# Patient Record
Sex: MALE | Race: BLACK OR AFRICAN AMERICAN | NOT HISPANIC OR LATINO | URBAN - METROPOLITAN AREA
[De-identification: names, ages, dates, MRNs, and addresses within clinical notes are randomized per-mention and may not be internally consistent; named-entity substitution may affect disease eponyms.]

---

## 2019-10-21 ENCOUNTER — INPATIENT (INPATIENT)
Facility: HOSPITAL | Age: 64
LOS: 17 days | Discharge: NURSING, REHAB OR LONG-TERM CARE | DRG: 981 | End: 2019-11-08
Attending: INTERNAL MEDICINE | Admitting: INTERNAL MEDICINE
Payer: MEDICAID

## 2019-10-21 VITALS
WEIGHT: 190.04 LBS | TEMPERATURE: 98 F | HEART RATE: 86 BPM | RESPIRATION RATE: 18 BRPM | SYSTOLIC BLOOD PRESSURE: 123 MMHG | DIASTOLIC BLOOD PRESSURE: 84 MMHG | OXYGEN SATURATION: 98 % | HEIGHT: 72 IN

## 2019-10-21 DIAGNOSIS — E11.9 TYPE 2 DIABETES MELLITUS WITHOUT COMPLICATIONS: ICD-10-CM

## 2019-10-21 DIAGNOSIS — K68.12 PSOAS MUSCLE ABSCESS: ICD-10-CM

## 2019-10-21 DIAGNOSIS — M86.679 OTHER CHRONIC OSTEOMYELITIS, UNSPECIFIED ANKLE AND FOOT: ICD-10-CM

## 2019-10-21 LAB
ALBUMIN SERPL ELPH-MCNC: 3.2 G/DL — LOW (ref 3.3–5)
ALP SERPL-CCNC: 66 U/L — SIGNIFICANT CHANGE UP (ref 40–120)
ALT FLD-CCNC: 7 U/L — LOW (ref 10–45)
ANION GAP SERPL CALC-SCNC: 10 MMOL/L — SIGNIFICANT CHANGE UP (ref 5–17)
APTT BLD: 30.3 SEC — SIGNIFICANT CHANGE UP (ref 27.5–36.3)
AST SERPL-CCNC: 13 U/L — SIGNIFICANT CHANGE UP (ref 10–40)
BASE EXCESS BLDV CALC-SCNC: 2.9 MMOL/L — HIGH (ref -2–2)
BASOPHILS # BLD AUTO: 0.07 K/UL — SIGNIFICANT CHANGE UP (ref 0–0.2)
BASOPHILS NFR BLD AUTO: 0.8 % — SIGNIFICANT CHANGE UP (ref 0–2)
BILIRUB SERPL-MCNC: 0.3 MG/DL — SIGNIFICANT CHANGE UP (ref 0.2–1.2)
BLD GP AB SCN SERPL QL: NEGATIVE — SIGNIFICANT CHANGE UP
BUN SERPL-MCNC: 11 MG/DL — SIGNIFICANT CHANGE UP (ref 7–23)
CA-I SERPL-SCNC: 1.19 MMOL/L — SIGNIFICANT CHANGE UP (ref 1.12–1.3)
CALCIUM SERPL-MCNC: 9.2 MG/DL — SIGNIFICANT CHANGE UP (ref 8.4–10.5)
CHLORIDE BLDV-SCNC: 107 MMOL/L — SIGNIFICANT CHANGE UP (ref 96–108)
CHLORIDE SERPL-SCNC: 100 MMOL/L — SIGNIFICANT CHANGE UP (ref 96–108)
CO2 BLDV-SCNC: 30 MMOL/L — SIGNIFICANT CHANGE UP (ref 22–30)
CO2 SERPL-SCNC: 25 MMOL/L — SIGNIFICANT CHANGE UP (ref 22–31)
CREAT SERPL-MCNC: 1.16 MG/DL — SIGNIFICANT CHANGE UP (ref 0.5–1.3)
CRP SERPL-MCNC: 1.22 MG/DL — HIGH (ref 0–0.4)
EOSINOPHIL # BLD AUTO: 0.1 K/UL — SIGNIFICANT CHANGE UP (ref 0–0.5)
EOSINOPHIL NFR BLD AUTO: 1.1 % — SIGNIFICANT CHANGE UP (ref 0–6)
GAS PNL BLDV: 135 MMOL/L — SIGNIFICANT CHANGE UP (ref 135–145)
GAS PNL BLDV: SIGNIFICANT CHANGE UP
GLUCOSE BLDC GLUCOMTR-MCNC: 260 MG/DL — HIGH (ref 70–99)
GLUCOSE BLDC GLUCOMTR-MCNC: 299 MG/DL — HIGH (ref 70–99)
GLUCOSE BLDV-MCNC: 183 MG/DL — HIGH (ref 70–99)
GLUCOSE SERPL-MCNC: 184 MG/DL — HIGH (ref 70–99)
HCO3 BLDV-SCNC: 28 MMOL/L — SIGNIFICANT CHANGE UP (ref 21–29)
HCT VFR BLD CALC: 26.8 % — LOW (ref 39–50)
HCT VFR BLDA CALC: 30 % — LOW (ref 39–50)
HGB BLD CALC-MCNC: 9.5 G/DL — LOW (ref 13–17)
HGB BLD-MCNC: 8.3 G/DL — LOW (ref 13–17)
IMM GRANULOCYTES NFR BLD AUTO: 0.9 % — SIGNIFICANT CHANGE UP (ref 0–1.5)
INR BLD: 1.09 RATIO — SIGNIFICANT CHANGE UP (ref 0.88–1.16)
LACTATE BLDV-MCNC: 1.4 MMOL/L — SIGNIFICANT CHANGE UP (ref 0.7–2)
LYMPHOCYTES # BLD AUTO: 2.76 K/UL — SIGNIFICANT CHANGE UP (ref 1–3.3)
LYMPHOCYTES # BLD AUTO: 29.8 % — SIGNIFICANT CHANGE UP (ref 13–44)
MCHC RBC-ENTMCNC: 27.1 PG — SIGNIFICANT CHANGE UP (ref 27–34)
MCHC RBC-ENTMCNC: 31 GM/DL — LOW (ref 32–36)
MCV RBC AUTO: 87.6 FL — SIGNIFICANT CHANGE UP (ref 80–100)
MONOCYTES # BLD AUTO: 1.01 K/UL — HIGH (ref 0–0.9)
MONOCYTES NFR BLD AUTO: 10.9 % — SIGNIFICANT CHANGE UP (ref 2–14)
NEUTROPHILS # BLD AUTO: 5.24 K/UL — SIGNIFICANT CHANGE UP (ref 1.8–7.4)
NEUTROPHILS NFR BLD AUTO: 56.5 % — SIGNIFICANT CHANGE UP (ref 43–77)
NRBC # BLD: 0 /100 WBCS — SIGNIFICANT CHANGE UP (ref 0–0)
OB PNL STL: POSITIVE
OTHER CELLS CSF MANUAL: 7 ML/DL — LOW (ref 18–22)
PCO2 BLDV: 49 MMHG — SIGNIFICANT CHANGE UP (ref 35–50)
PH BLDV: 7.38 — SIGNIFICANT CHANGE UP (ref 7.35–7.45)
PLATELET # BLD AUTO: 401 K/UL — HIGH (ref 150–400)
PO2 BLDV: 33 MMHG — SIGNIFICANT CHANGE UP (ref 25–45)
POTASSIUM BLDV-SCNC: 3.8 MMOL/L — SIGNIFICANT CHANGE UP (ref 3.5–5.3)
POTASSIUM SERPL-MCNC: 3.9 MMOL/L — SIGNIFICANT CHANGE UP (ref 3.5–5.3)
POTASSIUM SERPL-SCNC: 3.9 MMOL/L — SIGNIFICANT CHANGE UP (ref 3.5–5.3)
PROT SERPL-MCNC: 8.9 G/DL — HIGH (ref 6–8.3)
PROTHROM AB SERPL-ACNC: 12.6 SEC — SIGNIFICANT CHANGE UP (ref 10–12.9)
RBC # BLD: 3.06 M/UL — LOW (ref 4.2–5.8)
RBC # FLD: 15.5 % — HIGH (ref 10.3–14.5)
RH IG SCN BLD-IMP: POSITIVE — SIGNIFICANT CHANGE UP
RH IG SCN BLD-IMP: POSITIVE — SIGNIFICANT CHANGE UP
SAO2 % BLDV: 55 % — LOW (ref 67–88)
SODIUM SERPL-SCNC: 135 MMOL/L — SIGNIFICANT CHANGE UP (ref 135–145)
WBC # BLD: 9.26 K/UL — SIGNIFICANT CHANGE UP (ref 3.8–10.5)
WBC # FLD AUTO: 9.26 K/UL — SIGNIFICANT CHANGE UP (ref 3.8–10.5)

## 2019-10-21 PROCEDURE — 99284 EMERGENCY DEPT VISIT MOD MDM: CPT

## 2019-10-21 PROCEDURE — 73630 X-RAY EXAM OF FOOT: CPT | Mod: 26,RT

## 2019-10-21 PROCEDURE — 99223 1ST HOSP IP/OBS HIGH 75: CPT | Mod: GC

## 2019-10-21 PROCEDURE — 99223 1ST HOSP IP/OBS HIGH 75: CPT

## 2019-10-21 PROCEDURE — 71045 X-RAY EXAM CHEST 1 VIEW: CPT | Mod: 26

## 2019-10-21 RX ORDER — LEVETIRACETAM 250 MG/1
500 TABLET, FILM COATED ORAL
Refills: 0 | Status: DISCONTINUED | OUTPATIENT
Start: 2019-10-21 | End: 2019-11-08

## 2019-10-21 RX ORDER — HYDROMORPHONE HYDROCHLORIDE 2 MG/ML
1 INJECTION INTRAMUSCULAR; INTRAVENOUS; SUBCUTANEOUS EVERY 4 HOURS
Refills: 0 | Status: DISCONTINUED | OUTPATIENT
Start: 2019-10-21 | End: 2019-10-28

## 2019-10-21 RX ORDER — HYDROMORPHONE HYDROCHLORIDE 2 MG/ML
1 INJECTION INTRAMUSCULAR; INTRAVENOUS; SUBCUTANEOUS ONCE
Refills: 0 | Status: DISCONTINUED | OUTPATIENT
Start: 2019-10-21 | End: 2019-10-21

## 2019-10-21 RX ORDER — SODIUM CHLORIDE 9 MG/ML
10 INJECTION INTRAMUSCULAR; INTRAVENOUS; SUBCUTANEOUS
Refills: 0 | Status: DISCONTINUED | OUTPATIENT
Start: 2019-10-21 | End: 2019-11-08

## 2019-10-21 RX ORDER — DIVALPROEX SODIUM 500 MG/1
500 TABLET, DELAYED RELEASE ORAL DAILY
Refills: 0 | Status: DISCONTINUED | OUTPATIENT
Start: 2019-10-21 | End: 2019-11-06

## 2019-10-21 RX ORDER — OXYCODONE HYDROCHLORIDE 5 MG/1
10 TABLET ORAL ONCE
Refills: 0 | Status: DISCONTINUED | OUTPATIENT
Start: 2019-10-21 | End: 2019-10-21

## 2019-10-21 RX ORDER — DEXTROSE 50 % IN WATER 50 %
15 SYRINGE (ML) INTRAVENOUS ONCE
Refills: 0 | Status: DISCONTINUED | OUTPATIENT
Start: 2019-10-21 | End: 2019-11-08

## 2019-10-21 RX ORDER — AMLODIPINE BESYLATE 2.5 MG/1
10 TABLET ORAL DAILY
Refills: 0 | Status: DISCONTINUED | OUTPATIENT
Start: 2019-10-21 | End: 2019-11-08

## 2019-10-21 RX ORDER — MEROPENEM 1 G/30ML
1000 INJECTION INTRAVENOUS ONCE
Refills: 0 | Status: COMPLETED | OUTPATIENT
Start: 2019-10-21 | End: 2019-10-21

## 2019-10-21 RX ORDER — CHLORHEXIDINE GLUCONATE 213 G/1000ML
1 SOLUTION TOPICAL
Refills: 0 | Status: DISCONTINUED | OUTPATIENT
Start: 2019-10-21 | End: 2019-11-08

## 2019-10-21 RX ORDER — SODIUM CHLORIDE 9 MG/ML
1000 INJECTION, SOLUTION INTRAVENOUS
Refills: 0 | Status: DISCONTINUED | OUTPATIENT
Start: 2019-10-21 | End: 2019-11-08

## 2019-10-21 RX ORDER — PANTOPRAZOLE SODIUM 20 MG/1
80 TABLET, DELAYED RELEASE ORAL ONCE
Refills: 0 | Status: COMPLETED | OUTPATIENT
Start: 2019-10-21 | End: 2019-10-21

## 2019-10-21 RX ORDER — FOLIC ACID 0.8 MG
1 TABLET ORAL DAILY
Refills: 0 | Status: DISCONTINUED | OUTPATIENT
Start: 2019-10-21 | End: 2019-11-08

## 2019-10-21 RX ORDER — MIRTAZAPINE 45 MG/1
30 TABLET, ORALLY DISINTEGRATING ORAL DAILY
Refills: 0 | Status: DISCONTINUED | OUTPATIENT
Start: 2019-10-21 | End: 2019-11-08

## 2019-10-21 RX ORDER — APIXABAN 2.5 MG/1
5 TABLET, FILM COATED ORAL EVERY 12 HOURS
Refills: 0 | Status: DISCONTINUED | OUTPATIENT
Start: 2019-10-21 | End: 2019-10-23

## 2019-10-21 RX ORDER — TIOTROPIUM BROMIDE 18 UG/1
1 CAPSULE ORAL; RESPIRATORY (INHALATION) DAILY
Refills: 0 | Status: DISCONTINUED | OUTPATIENT
Start: 2019-10-21 | End: 2019-11-08

## 2019-10-21 RX ORDER — DEXTROSE 50 % IN WATER 50 %
12.5 SYRINGE (ML) INTRAVENOUS ONCE
Refills: 0 | Status: DISCONTINUED | OUTPATIENT
Start: 2019-10-21 | End: 2019-11-08

## 2019-10-21 RX ORDER — OXYCODONE AND ACETAMINOPHEN 5; 325 MG/1; MG/1
2 TABLET ORAL EVERY 4 HOURS
Refills: 0 | Status: DISCONTINUED | OUTPATIENT
Start: 2019-10-21 | End: 2019-10-28

## 2019-10-21 RX ORDER — GABAPENTIN 400 MG/1
100 CAPSULE ORAL THREE TIMES A DAY
Refills: 0 | Status: DISCONTINUED | OUTPATIENT
Start: 2019-10-21 | End: 2019-10-30

## 2019-10-21 RX ORDER — INSULIN LISPRO 100/ML
10 VIAL (ML) SUBCUTANEOUS
Refills: 0 | Status: DISCONTINUED | OUTPATIENT
Start: 2019-10-21 | End: 2019-10-23

## 2019-10-21 RX ORDER — FERROUS SULFATE 325(65) MG
325 TABLET ORAL DAILY
Refills: 0 | Status: DISCONTINUED | OUTPATIENT
Start: 2019-10-21 | End: 2019-11-08

## 2019-10-21 RX ORDER — PIPERACILLIN AND TAZOBACTAM 4; .5 G/20ML; G/20ML
3.38 INJECTION, POWDER, LYOPHILIZED, FOR SOLUTION INTRAVENOUS ONCE
Refills: 0 | Status: COMPLETED | OUTPATIENT
Start: 2019-10-21 | End: 2019-10-21

## 2019-10-21 RX ORDER — ACETAMINOPHEN 500 MG
325 TABLET ORAL DAILY
Refills: 0 | Status: DISCONTINUED | OUTPATIENT
Start: 2019-10-21 | End: 2019-11-08

## 2019-10-21 RX ORDER — INSULIN LISPRO 100/ML
VIAL (ML) SUBCUTANEOUS AT BEDTIME
Refills: 0 | Status: DISCONTINUED | OUTPATIENT
Start: 2019-10-21 | End: 2019-10-23

## 2019-10-21 RX ORDER — ALBUTEROL 90 UG/1
2 AEROSOL, METERED ORAL EVERY 6 HOURS
Refills: 0 | Status: DISCONTINUED | OUTPATIENT
Start: 2019-10-21 | End: 2019-11-08

## 2019-10-21 RX ORDER — DEXTROSE 50 % IN WATER 50 %
25 SYRINGE (ML) INTRAVENOUS ONCE
Refills: 0 | Status: DISCONTINUED | OUTPATIENT
Start: 2019-10-21 | End: 2019-11-08

## 2019-10-21 RX ORDER — GLUCAGON INJECTION, SOLUTION 0.5 MG/.1ML
1 INJECTION, SOLUTION SUBCUTANEOUS ONCE
Refills: 0 | Status: DISCONTINUED | OUTPATIENT
Start: 2019-10-21 | End: 2019-11-08

## 2019-10-21 RX ORDER — INSULIN GLARGINE 100 [IU]/ML
26 INJECTION, SOLUTION SUBCUTANEOUS AT BEDTIME
Refills: 0 | Status: DISCONTINUED | OUTPATIENT
Start: 2019-10-21 | End: 2019-10-23

## 2019-10-21 RX ORDER — PIPERACILLIN AND TAZOBACTAM 4; .5 G/20ML; G/20ML
3.38 INJECTION, POWDER, LYOPHILIZED, FOR SOLUTION INTRAVENOUS EVERY 8 HOURS
Refills: 0 | Status: DISCONTINUED | OUTPATIENT
Start: 2019-10-21 | End: 2019-10-31

## 2019-10-21 RX ORDER — VANCOMYCIN HCL 1 G
1000 VIAL (EA) INTRAVENOUS EVERY 12 HOURS
Refills: 0 | Status: DISCONTINUED | OUTPATIENT
Start: 2019-10-21 | End: 2019-11-08

## 2019-10-21 RX ORDER — INSULIN LISPRO 100/ML
VIAL (ML) SUBCUTANEOUS
Refills: 0 | Status: DISCONTINUED | OUTPATIENT
Start: 2019-10-21 | End: 2019-10-23

## 2019-10-21 RX ADMIN — HYDROMORPHONE HYDROCHLORIDE 1 MILLIGRAM(S): 2 INJECTION INTRAMUSCULAR; INTRAVENOUS; SUBCUTANEOUS at 12:10

## 2019-10-21 RX ADMIN — PIPERACILLIN AND TAZOBACTAM 200 GRAM(S): 4; .5 INJECTION, POWDER, LYOPHILIZED, FOR SOLUTION INTRAVENOUS at 17:59

## 2019-10-21 RX ADMIN — OXYCODONE AND ACETAMINOPHEN 2 TABLET(S): 5; 325 TABLET ORAL at 20:50

## 2019-10-21 RX ADMIN — PANTOPRAZOLE SODIUM 80 MILLIGRAM(S): 20 TABLET, DELAYED RELEASE ORAL at 11:22

## 2019-10-21 RX ADMIN — HYDROMORPHONE HYDROCHLORIDE 1 MILLIGRAM(S): 2 INJECTION INTRAMUSCULAR; INTRAVENOUS; SUBCUTANEOUS at 18:16

## 2019-10-21 RX ADMIN — GABAPENTIN 100 MILLIGRAM(S): 400 CAPSULE ORAL at 21:10

## 2019-10-21 RX ADMIN — HYDROMORPHONE HYDROCHLORIDE 1 MILLIGRAM(S): 2 INJECTION INTRAMUSCULAR; INTRAVENOUS; SUBCUTANEOUS at 12:36

## 2019-10-21 RX ADMIN — HYDROMORPHONE HYDROCHLORIDE 1 MILLIGRAM(S): 2 INJECTION INTRAMUSCULAR; INTRAVENOUS; SUBCUTANEOUS at 13:40

## 2019-10-21 RX ADMIN — OXYCODONE HYDROCHLORIDE 10 MILLIGRAM(S): 5 TABLET ORAL at 13:45

## 2019-10-21 RX ADMIN — OXYCODONE HYDROCHLORIDE 10 MILLIGRAM(S): 5 TABLET ORAL at 14:30

## 2019-10-21 RX ADMIN — HYDROMORPHONE HYDROCHLORIDE 1 MILLIGRAM(S): 2 INJECTION INTRAMUSCULAR; INTRAVENOUS; SUBCUTANEOUS at 11:30

## 2019-10-21 RX ADMIN — HYDROMORPHONE HYDROCHLORIDE 1 MILLIGRAM(S): 2 INJECTION INTRAMUSCULAR; INTRAVENOUS; SUBCUTANEOUS at 19:07

## 2019-10-21 RX ADMIN — Medication 250 MILLIGRAM(S): at 18:31

## 2019-10-21 RX ADMIN — HYDROMORPHONE HYDROCHLORIDE 1 MILLIGRAM(S): 2 INJECTION INTRAMUSCULAR; INTRAVENOUS; SUBCUTANEOUS at 10:20

## 2019-10-21 RX ADMIN — INSULIN GLARGINE 26 UNIT(S): 100 INJECTION, SOLUTION SUBCUTANEOUS at 22:31

## 2019-10-21 RX ADMIN — HYDROMORPHONE HYDROCHLORIDE 1 MILLIGRAM(S): 2 INJECTION INTRAMUSCULAR; INTRAVENOUS; SUBCUTANEOUS at 22:56

## 2019-10-21 RX ADMIN — Medication 3: at 18:08

## 2019-10-21 RX ADMIN — Medication 1: at 22:26

## 2019-10-21 RX ADMIN — MEROPENEM 100 MILLIGRAM(S): 1 INJECTION INTRAVENOUS at 13:40

## 2019-10-21 RX ADMIN — HYDROMORPHONE HYDROCHLORIDE 1 MILLIGRAM(S): 2 INJECTION INTRAMUSCULAR; INTRAVENOUS; SUBCUTANEOUS at 09:50

## 2019-10-21 RX ADMIN — HYDROMORPHONE HYDROCHLORIDE 1 MILLIGRAM(S): 2 INJECTION INTRAMUSCULAR; INTRAVENOUS; SUBCUTANEOUS at 16:01

## 2019-10-21 RX ADMIN — HYDROMORPHONE HYDROCHLORIDE 1 MILLIGRAM(S): 2 INJECTION INTRAMUSCULAR; INTRAVENOUS; SUBCUTANEOUS at 22:26

## 2019-10-21 RX ADMIN — OXYCODONE AND ACETAMINOPHEN 2 TABLET(S): 5; 325 TABLET ORAL at 20:21

## 2019-10-21 NOTE — ED PROVIDER NOTE - ATTENDING CONTRIBUTION TO CARE
****ATTENDING**** 62yo male transferred from [facility] for Neurosurgery spine. Pt was being treated for R foot osteomyelitis on vanc and zosyn last dose 10/21 540. Pt had recent falls and evaluated at OSH, evaluated for back pain and found to have psoas abscess and possible discitis of T12 L1. Pt moving all 4 extremities and no incontinence.  On exam, patient is alert x 3, NAD,  Patient's chest is clear to ausculation, +s1s2. Abdomen is soft nd/nt +BS. B/L feet in dressing. R foot removed, healing wound, no active discharge or eythema.  ID and Neurosurgery consult. Labs and cultures.

## 2019-10-21 NOTE — CONSULT NOTE ADULT - ASSESSMENT
63M with bilateral foot ulcerations - chronic   -pt seen and evaluated   -chronic ulcerations of bilateral feet both probing to bone but no acute signs of infection   -Pt has had multiple operations for his feet and states is currently on a PICC line - unclear exactly when it was placed. States has been going to wound care center at [facility] and wounds have been slowly improving   -recommend obtaining records from [facility] to show what pt was PICCd on / duration   -pt uninterested in any further amputation at this time, would like to continue local wound care and abx regimen  -No plan for podiatric surgical intervention   -Recommend Zflows to be worn while in bed   -Will continue to follow   -Discussed with attending

## 2019-10-21 NOTE — CONSULT NOTE ADULT - ATTENDING COMMENTS
I reviewed the resident's note and agree. The patient is a 63-year-old male nursing home resident on IV antibiotics for right foot osteomyelitis, who was transferred from [facility] complaining of worsening back pain s/p fall a few days ago, found via CT and a partially completed MRI to have an L1 acute-to-subacute mild superior endplate compression fracture and a right psoas hematoma, myositis, or abscess without any spinal cord, conus medullaris, or cauda equina compression. No acute neurosurgical intervention is indicated at this time. Recommend an MRI of the thoracolumbar spine with and without contrast for further evaluation. I saw and evaluated the patient. I reviewed the resident's note and agree. The patient is a 63-year-old male nursing home resident on IV antibiotics for right foot osteomyelitis, who was transferred from [facility] complaining of worsening back pain s/p fall a few days ago, found via CT and a partially completed MRI to have an L1 acute-to-subacute mild superior endplate compression fracture and a right psoas hematoma, myositis, or abscess without any spinal cord, conus medullaris, or cauda equina compression. No acute neurosurgical intervention is indicated at this time. Recommend an MRI of the thoracolumbar spine with and without contrast for further evaluation.

## 2019-10-21 NOTE — CONSULT NOTE ADULT - ASSESSMENT
Matthew Cope  63M  with multiple medical comorbidities with R TMA ( with active osteomyelitis)  and L 1st toe amputation currently on Iv vanc/zosyn (unclear duration). patient had a fall 5 days ago. Seen at OSH ED then and discharged. CT abd pelvis shows psoas abscess, with L1 compression fx with possible extension to T12-L1 space.  On exam neurologically intact with back pain controlled on IV medication  - no acute Nerurosurgical intervention. Would recommend MRI WWO contrast  - ID consult for abx managment given ? MRSA and potential failure of current abx therapy  - Given ongoing infection and psoas abscess in proximity patient is unlikely to be surgical candidate at this point. Matthew Cope  63M  with multiple medical comorbidities with R TMA ( with active osteomyelitis)  and L 1st toe amputation currently on Iv vanc/zosyn (unclear duration). patient had a fall 5 days ago. Seen at OSH ED then and discharged. CT abd pelvis shows psoas abscess, with L1 compression fx with possible extension to T12-L1 space.  On exam neurologically intact with back pain controlled on IV medication  - no acute Nerurosurgical intervention. Would recommend MRI WWO contrast. patient unable to tolerate at this point. Would recommend under anesthesia  - ID consult for abx managment given ? MRSA and potential failure of current abx therapy  - Given ongoing infection and psoas abscess in proximity patient is unlikely to be surgical candidate at this point. Matthew Cope  63M  with multiple medical comorbidities with R TMA ( with active osteomyelitis)  and L 1st toe amputation currently on Iv vanc/zosyn (unclear duration). patient had a fall 5 days ago. Seen at OSH ED then and discharged. CT abd pelvis shows psoas ?abscess, with L1 compression fx.  On exam neurologically intact with back pain controlled on IV medication  - no acute Nerurosurgical intervention. Would recommend MRI WWO contrast. patient unable to tolerate at this point. Would recommend under anesthesia  - ID consult for abx managment

## 2019-10-21 NOTE — ED ADULT NURSE NOTE - OBJECTIVE STATEMENT
0905 63 yr old BM brought to ER via ambulance on stretcher from [facility] for further eval and tx. s/p fall 4 days ago at nursing home. No LOC. Was diagnosed with "Compression Fractures of spine" and abscess. Was being treated with IV antibiotics for osteomyelitis left foot and right stump. Has PICC line left upper arm. A&Ox4. C/O 10/10 pain. inspine. SkinW&D. Lips and nailbeds pink. Also c/o black stools 2 days ago. denies fever, chills, dizziness, chest pain, palp, SOB. Lungs clear. abd soft. States hx of 14 surguries on right foot.. Fall risk precautions maintained. Received pain meds at [facility] ER. dilaudid IV 2 mg at 540AM and morphine6 mg IV at 750 AM 0905 63 yr old BM brought to ER via ambulance on stretcher from [facility] for further eval and tx. s/p fall 4 days ago at nursing home. No LOC. Was diagnosed with "Compression Fractures of spine" and abscess. Was being treated with IV antibiotics for osteomyelitis left foot and right stump. Has PICC line left upper arm. A&Ox4. C/O 10/10 pain in spine. SkinW&D. Lips and nailbeds pink. Also c/o black stools 2 days ago. denies fever, chills, dizziness, chest pain, palp, SOB. Lungs clear. abd soft. States hx of 14 surguries on right foot.. Fall risk precautions maintained. Received pain meds at [facility] ER. dilaudid IV 2 mg at 540AM and morphine6 mg IV at 750 AM. dressing intact right r stump and left foot. Opened wounds visible both sites with prurulent drainage. dressings changed. Dry sterile dressings applied

## 2019-10-21 NOTE — ED ADULT NURSE REASSESSMENT NOTE - NS ED NURSE REASSESS COMMENT FT1
RN administered 1mg Dilaudid in MRI to patient for pain. pt refused to received MRI until pain was controlled. VSS, pt resting comfortably at MRI, MD aware

## 2019-10-21 NOTE — ED PROVIDER NOTE - SKIN, MLM
right foot with half amputation, chronic wound without signs of infection.  Left foot with 3 cm wounds on top and bottom of the foot without surrounding erythema

## 2019-10-21 NOTE — ED PROVIDER NOTE - CARE PLAN
Principal Discharge DX:	Psoas abscess  Secondary Diagnosis:	Discitis  Secondary Diagnosis:	Osteomyelitis Principal Discharge DX:	Psoas abscess  Secondary Diagnosis:	Discitis  Secondary Diagnosis:	Osteomyelitis  Secondary Diagnosis:	GI bleed

## 2019-10-21 NOTE — CONSULT NOTE ADULT - ASSESSMENT
62 yo male with DM, HTN, and epilepsy was transferred from [facility] for possible T12-L1 discitis / osteomyelitis following a mechanical fall 5 days ago. Currently receiving IV Vanc/Zosyn for right foot osteomyelitis. Was seen at [facility] 10/20 where a CT abd/pelvis found an enlarged psoas muscle with ? abscesses with possible extension to the T12 L1 disc with a compression fracture suspicious for osteomyelitis.      Assessment:     Plan: 64 yo male with DM, HTN, and epilepsy was transferred from [facility] for possible T12-L1 discitis / osteomyelitis following a mechanical fall 5 days ago. Currently receiving IV Vanc/Zosyn for right foot osteomyelitis. Was seen at [facility] 10/20 where a CT abd/pelvis found an enlarged psoas muscle with ? abscesses with possible extension to the T12 L1 disc with a compression fracture suspicious for osteomyelitis.      Plan:   - MRI with contrast: If psoas abscess, recommend aspiration  - f/u BCx and CRP  - c/w Zosyn and Vanc 1g q12. Per patient, has completed 1 week of 5 week course 62 yo male with DM, HTN, and epilepsy was transferred from [facility] for possible T12-L1 discitis / osteomyelitis following a mechanical fall 5 days ago. Currently receiving IV Vanc/Zosyn for right foot osteomyelitis. Was seen at [facility] 10/20 where a CT abd/pelvis found an enlarged psoas muscle with ? abscesses with possible extension to the T12 L1 disc with a compression fracture suspicious for osteomyelitis.      Overall foot OM, concern for psoas abscess, spinal OM, elevated ESR.       Plan:   - MRI with contrast  - f/u BCx and CRP  - c/w Zosyn, ordered.   - C/W Vanc 1g q12. Per patient, has completed 1 week of 5 week course.  - check trough prior to 4th dose.  - S/p Neurosurgery eval   - recommend podiatry eval.

## 2019-10-21 NOTE — CONSULT NOTE ADULT - SUBJECTIVE AND OBJECTIVE BOX
p ([id number])     HPI:   63 y.o. male transfer from [facility] for evaluation of a possible T12, L1 discitis / osteomyelitis.  Pt has chronic medical issues, is currently residing at a nursing facility for IV antibiotics (Vanc / Zosyn) for a right foot osteomyelitis.  5 ays ago had a mechanical trip and fall landing on his tailbone when he tried to get up from his wheelchair.  Was gotten up off the floor with assistance from family, seen that day at [facility] and discharged home.  Continued to have worsening lower back pain and was seen at [facility] yesterday.  There they did a CT abd and pelvis showing an enlarges psoas muscle with multiple abscesses with possible extension to the T12 L1 disc with a compression fracture suspicious for osteomyelitis.  No fevers, no chills.  No abd pain nausea or vomiting, no numbness, no weakness.  Movement makes the pain wrose, dilaudid makes it better.    Imaging:  Psoas abscess with possible extension to T12-L1    Exam:  Exam:  AOx3, Following Commands  Motor:          Upper extremity                       Delt      Bicep     Tricep     HG                                                 L         5/5        5/5          5/5        5/5                                               R          5/5       5/5          5/5        5/5          Lower extremity                           HF          KF         KE         DF        PF                                                  L           5/5         5/5        5/5       5/5        5/5                                               R           5/5         5/5        5/5       5/5        5/5  Sensation / Reflexes  [x] intact to light touch  [ ] decreased:   No clonus      --Anticoagulation:    =====================  PAST MEDICAL HISTORY   Diabetes  Osteomyelitis    PAST SURGICAL HISTORY         MEDICATIONS:  Antibiotics:    Neuro:  HYDROmorphone  Injectable 1 milliGRAM(s) IV Push Once    Other:      SOCIAL HISTORY:   Occupation:   Marital Status:     FAMILY HISTORY:      ROS: Negative except per HPI    LABS:  PT/INR - ( 21 Oct 2019 09:40 )   PT: 12.6 sec;   INR: 1.09 ratio         PTT - ( 21 Oct 2019 09:40 )  PTT:30.3 sec                        8.3    9.26  )-----------( 401      ( 21 Oct 2019 09:40 )             26.8     10-21    135  |  100  |  11  ----------------------------<  184<H>  3.9   |  25  |  1.16    Ca    9.2      21 Oct 2019 09:40    TPro  8.9<H>  /  Alb  3.2<L>  /  TBili  0.3  /  DBili  x   /  AST  13  /  ALT  7<L>  /  AlkPhos  66  10-21 p ([id number])     HPI:   63 y.o. male transfer from [facility] for evaluation of a possible ? discitis / osteomyelitis.  Pt has chronic medical issues, is currently residing at a nursing facility for IV antibiotics (Vanc / Zosyn) for a right foot osteomyelitis.  5 ays ago had a mechanical trip and fall landing on his tailbone when he tried to get up from his wheelchair.  Was gotten up off the floor with assistance from family, seen that day at [facility] and discharged home.  Continued to have worsening lower back pain and was seen at [facility] yesterday.  There they did a CT abd and pelvis showing an enlarges psoas muscle with ? abscesses with possible extension to the T12 L1 disc with a compression fracture suspicious for osteomyelitis.  No fevers, no chills.  No abd pain nausea or vomiting, no numbness, no weakness.  Movement makes the pain wrose, dilaudid makes it better.    Imaging:  Psoas abscess with possible extension to T12-L1    Exam:  Exam:  AOx3, Following Commands  Motor:          Upper extremity                       Delt      Bicep     Tricep     HG                                                 L         5/5        5/5          5/5        5/5                                               R          5/5       5/5          5/5        5/5          Lower extremity                           HF          KF         KE         DF        PF                                                  L           5/5         5/5        5/5       5/5        5/5                                               R           5/5         5/5        5/5       5/5        5/5  Sensation / Reflexes  [x] intact to light touch  [ ] decreased:   No clonus      --Anticoagulation:    =====================  PAST MEDICAL HISTORY   Diabetes  Osteomyelitis    PAST SURGICAL HISTORY         MEDICATIONS:  Antibiotics:    Neuro:  HYDROmorphone  Injectable 1 milliGRAM(s) IV Push Once    Other:      SOCIAL HISTORY:   Occupation:   Marital Status:     FAMILY HISTORY:      ROS: Negative except per HPI    LABS:  PT/INR - ( 21 Oct 2019 09:40 )   PT: 12.6 sec;   INR: 1.09 ratio         PTT - ( 21 Oct 2019 09:40 )  PTT:30.3 sec                        8.3    9.26  )-----------( 401      ( 21 Oct 2019 09:40 )             26.8     10-21    135  |  100  |  11  ----------------------------<  184<H>  3.9   |  25  |  1.16    Ca    9.2      21 Oct 2019 09:40    TPro  8.9<H>  /  Alb  3.2<L>  /  TBili  0.3  /  DBili  x   /  AST  13  /  ALT  7<L>  /  AlkPhos  66  10-21

## 2019-10-21 NOTE — ED PROVIDER NOTE - OBJECTIVE STATEMENT
63 y.o. male transfer from [facility] for evaluation of a possible T12, L1 discitis / osteomyelitis.  Pt has chronic medical issues, is currently residing at a nursing facility for IV antibiotics (Vanc / Zosyn) for a right foot osteomyelitis.  5 ays ago had a mechanical trip and fall landing on his tailbone when he tried to get up from his wheelchair.  Was gotten up off the floor with assistance from family, seen that day at [facility] and discharged home.  Continued to have worsening lower back pain and was seen at [facility] yesterday.  There they did a CT abd and pelvis showing an enlarges psoas muscle with multiple abscesses with possible extension to the T12 L1 disc with a compression fracture suspicious for osteomyelitis.  No fevers, no chills.  No abd pain nausea or vomiting, no numbness, no weakness.  Movement makes the pain wrose, dilaudid makes it better.

## 2019-10-21 NOTE — CONSULT NOTE ADULT - SUBJECTIVE AND OBJECTIVE BOX
Patient is a 63y old  Male who presents with a chief complaint of Transferred from [place] (21 Oct 2019 14:59)      HPI: 63 y.o. male with multiple chronic medical issues including DM, HTN, and epilepsy was transferred from [facility] for evaluation of a possible ? discitis / osteomyelitis following a mechanical fall landing on his tailbone when he tried to get up from his wheelchair 5 days ago. He is currently residing at a nursing facility for IV antibiotics via left PICC (Vanc / Zosyn) for right foot osteomyelitis.  Was seen at [place] ED after the fall and discharged home, but lower back pain persisted and worsened. He was seen at [facility] 10/20 where a CT abd/pelvis found an enlarged psoas muscle with ? abscesses with possible extension to the T12 L1 disc with a compression fracture suspicious for osteomyelitis.  He complains of fevers and chills, constipation x1 week. Denies abd pain, nausea or nausea/vomiting, numbness, or weakness.  Movement makes the pain worse, Dilaudid makes it better. Pt not very clear with timeline of abx, says PICC was placed a week ago at [facility]. He says he has been on abx since 2014 for the foot at different hospitals. Denies any fever or chills since the fall. No urinary or bowel incontinence.       PAST MEDICAL & SURGICAL HISTORY:  Diabetes  Osteomyelitis      MEDICATIONS  (STANDING):  chlorhexidine 4% Liquid 1 Application(s) Topical <User Schedule>  dextrose 5%. 1000 milliLiter(s) (50 mL/Hr) IV Continuous <Continuous>  dextrose 50% Injectable 12.5 Gram(s) IV Push once  dextrose 50% Injectable 25 Gram(s) IV Push once  dextrose 50% Injectable 25 Gram(s) IV Push once  insulin lispro (HumaLOG) corrective regimen sliding scale   SubCutaneous three times a day before meals  insulin lispro (HumaLOG) corrective regimen sliding scale   SubCutaneous at bedtime  piperacillin/tazobactam IVPB.. 3.375 Gram(s) IV Intermittent every 8 hours  vancomycin  IVPB 1000 milliGRAM(s) IV Intermittent every 12 hours    MEDICATIONS  (PRN):  dextrose 40% Gel 15 Gram(s) Oral once PRN Blood Glucose LESS THAN 70 milliGRAM(s)/deciliter  glucagon  Injectable 1 milliGRAM(s) IntraMuscular once PRN Glucose LESS THAN 70 milligrams/deciliter  HYDROmorphone  Injectable 1 milliGRAM(s) IV Push every 4 hours PRN Severe Pain (7 - 10)  sodium chloride 0.9% lock flush 10 milliLiter(s) IV Push every 1 hour PRN Pre/post blood products, medications, blood draw, and to maintain line patency      Allergies    Allergy Status Unknown    Intolerances        VITALS:    Vital Signs Last 24 Hrs  T(C): 37 (21 Oct 2019 16:49), Max: 37.3 (21 Oct 2019 15:24)  T(F): 98.6 (21 Oct 2019 16:49), Max: 99.1 (21 Oct 2019 15:24)  HR: 90 (21 Oct 2019 16:49) (86 - 99)  BP: 148/78 (21 Oct 2019 16:49) (123/84 - 159/67)  BP(mean): --  RR: 18 (21 Oct 2019 16:49) (18 - 20)  SpO2: 97% (21 Oct 2019 16:49) (97% - 100%)    LABS:                          8.3    9.26  )-----------( 401      ( 21 Oct 2019 09:40 )             26.8       10-21    135  |  100  |  11  ----------------------------<  184<H>  3.9   |  25  |  1.16    Ca    9.2      21 Oct 2019 09:40    TPro  8.9<H>  /  Alb  3.2<L>  /  TBili  0.3  /  DBili  x   /  AST  13  /  ALT  7<L>  /  AlkPhos  66  10-21      CAPILLARY BLOOD GLUCOSE      POCT Blood Glucose.: 260 mg/dL (21 Oct 2019 17:11)      PT/INR - ( 21 Oct 2019 09:40 )   PT: 12.6 sec;   INR: 1.09 ratio         PTT - ( 21 Oct 2019 09:40 )  PTT:30.3 sec    LOWER EXTREMITY PHYSICAL EXAM:    Vasular: DP/PT 2/4, B/L, non palpable DP Right foot, CFT < 3 seconds L, Temperature gradient wnl B/L.   Neuro: Epicritic sensation absent to the level of ankle B/L.  Musculoskeletal/Ortho: s/p Right foot choparts amputation and Left foot 1st toe amp  Skin: Right foot plantar Right foot stump ulceration, mostly granular tissue with track to bone, no purulence, no malodor, no cellulitis, no signs of infection   Left foot dorsal and plantar 3rd metatarsal head ulcer with fibrogranular base, probes to bone, no purulence/malodor/signs of infection      RADIOLOGY & ADDITIONAL STUDIES:    < from: Xray Foot AP + Lateral + Oblique, Right (10.21.19 @ 10:57) >    EXAM:  FOOT COMPLETE RIGHT (MIN 3 VIEW)                            PROCEDURE DATE:  10/21/2019            INTERPRETATION:  Indication: Right foot osteomyelitis    Technique: 3 views of the right foot.    Comparison: None available.    Impression: The patient is status post amputation of the right forefoot.   There is cortical irregularity of the plantar surface of the calcaneus   and talus which may be secondary to underlying osteomyelitis. There is   marked soft tissue swelling/edema overlyingthe calcaneus. No significant   subcutaneous gas is seen. If clinically warranted, MRI may be performed   for further evaluation.                    CHEMO DERAS M.D., ATTENDING RADIOLOGIST  This document has been electronically signed. Oct 21 2019 11:09AM                < end of copied text >

## 2019-10-21 NOTE — CONSULT NOTE ADULT - SUBJECTIVE AND OBJECTIVE BOX
Jakob Patel, PGY1  Internal Medicine, Infectious Diseases Service  Pager: [phone] (DARRICK), [phone] (NS)    HPI: 63 y.o. male with multiple chronic medical issues including DM, HTN, and epilepsy was transferred from [facility] for evaluation of a possible ? discitis / osteomyelitis following a mechanical fall landing on his tailbone when he tried to get up from his wheelchair 5 days ago. He is currently residing at a nursing facility for IV antibiotics via left PICC (Vanc / Zosyn) for right foot osteomyelitis.  Was seen at [place] ED after the fall and discharged home, but lower back pain persisted and worsened. He was seen at [facility] 10/20 where a CT abd/pelvis found an enlarged psoas muscle with ? abscesses with possible extension to the T12 L1 disc with a compression fracture suspicious for osteomyelitis.  He complains of fevers and chills, constipation x1 week. Denies abd pain, nausea or nausea/vomiting, numbness, or weakness.  Movement makes the pain worse, Dilaudid makes it better.    PAST MEDICAL & SURGICAL HISTORY:  Diabetes  Hypertension  Osteomyelitis  Seizure disorder (?)    Allergies: denies    ANTIMICROBIALS (past 90 days)  MEDICATIONS  (STANDING):  meropenem  IVPB   100 mL/Hr IV Intermittent (10-21-19 @ 13:40)      ANTIMICROBIALS:      OTHER MEDS: MEDICATIONS  (STANDING):    SOCIAL HISTORY:   hx smoking  non-smoker    FAMILY HISTORY:    REVIEW OF SYSTEMS  [X] All other systems negative except as noted below:	    Constitutional:  [X] fever [X] chills  [X] weight loss  [ ] weakness  Skin:  [ ] rash [ ] phlebitis	  Eyes: [X] blurry vision [ ] pain  [ ] discharge	  ENMT: [ ] sore throat  [ ] thrush [ ] ulcers [ ] exudates  Respiratory: [ ] dyspnea [ ] hemoptysis [ ] cough [ ] sputum	  Cardiovascular:  [ ] chest pain [ ] palpitations [ ] edema	  Gastrointestinal:  [ ] nausea [ ] vomiting [ ] diarrhea [X] constipation x1 week [X] black stools [ ] pain	  Genitourinary:  [ ] dysuria [ ] frequency [ ] hematuria [ ] discharge [ ] flank pain  [ ] incontinence  Musculoskeletal:  [ ] myalgias [ ] arthralgias [ ] arthritis  [X] back pain 10/10 entire back  Neurological:  [ ] headache [X] H/O seizure disorder  [ ] confusion/altered mental status  Psychiatric:  [ ] anxiety [ ] depression	  Hematology/Lymphatics:  [ ] lymphadenopathy  Endocrine:  [ ] adrenal [ ] thyroid  Allergic/Immunologic:	 [ ] transplant [ ] seasonal    Vital Signs Last 24 Hrs  T(F): 97.8 (10-21-19 @ 09:08), Max: 98.1 (10-21-19 @ 08:56)  Vital Signs Last 24 Hrs  HR: 90 (10-21-19 @ 12:36) (86 - 91)  BP: 138/82 (10-21-19 @ 12:36) (123/84 - 156/83)  RR: 18 (10-21-19 @ 12:36)  SpO2: 99% (10-21-19 @ 12:36) (98% - 100%)  Wt(kg): --    PHYSICAL EXAM:  General: uncomfortable, in pain, lying on left side. Uncooperative with exam.  HEAD/EYES: anicteric, pupils poorly reactive to light. EOMI  ENT:  supple. No thrush.  Cardiovascular:   Refused exam  Respiratory:  Refused exam  GI:  Refused exam  :  Refused exam due to pain  Musculoskeletal:  Entire back acutely tender. Right foot midfoot amputation.  Neurologic:  grossly non-focal. No numbness or tingling in bilateral LE. Moves all 4 extremities spontaneously  Skin:  no rash  Lymph: no lymphadenopathy  Psychiatric:  appropriate affect                        8.3    9.26  )-----------( 401      ( 21 Oct 2019 09:40 )             26.8     10-21    135  |  100  |  11  ----------------------------<  184<H>  3.9   |  25  |  1.16    Ca    9.2      21 Oct 2019 09:40    TPro  8.9<H>  /  Alb  3.2<L>  /  TBili  0.3  /  DBili  x   /  AST  13  /  ALT  7<L>  /  AlkPhos  66  10-21    MICROBIOLOGY:  Sent to [facility] from [facility]. Not ready on 10/21. Call [phone] on 10/22    RADIOLOGY:  CT a/p from OSH    could not tolerate MRI due to pain. Jakob Patel, PGY1  Internal Medicine, Infectious Diseases Service  Pager: [phone] (DARRICK), [phone] (NS)    HPI: 63 y.o. male with multiple chronic medical issues including DM, HTN, and epilepsy was transferred from [facility] for evaluation of a possible ? discitis / osteomyelitis following a mechanical fall landing on his tailbone when he tried to get up from his wheelchair 5 days ago. He is currently residing at a nursing facility for IV antibiotics via left PICC (Vanc / Zosyn) for right foot osteomyelitis.  Was seen at [place] ED after the fall and discharged home, but lower back pain persisted and worsened. He was seen at [facility] 10/20 where a CT abd/pelvis found an enlarged psoas muscle with ? abscesses with possible extension to the T12 L1 disc with a compression fracture suspicious for osteomyelitis.  He complains of fevers and chills, constipation x1 week. Denies abd pain, nausea or nausea/vomiting, numbness, or weakness.  Movement makes the pain worse, Dilaudid makes it better.    PAST MEDICAL & SURGICAL HISTORY:  Diabetes  Hypertension  Osteomyelitis  Seizure disorder (?)  R mid-foot amputation  L 1st toe amputation    Allergies: denies    ANTIMICROBIALS (past 90 days)  MEDICATIONS  (STANDING):  meropenem  IVPB   100 mL/Hr IV Intermittent (10-21-19 @ 13:40)    ANTIMICROBIALS:      OTHER MEDS: MEDICATIONS  (STANDING):    SOCIAL HISTORY: Former smoker 37 pack years. Denies current drinking or recreational drug use.    FAMILY HISTORY: Father with Diabetes Mellitus    REVIEW OF SYSTEMS  [X] All other systems negative except as noted below:	    Constitutional:  [X] fever [X] chills  [X] weight loss  [ ] weakness  Skin:  [ ] rash [ ] phlebitis	  Eyes: [X] blurry vision [ ] pain  [ ] discharge	  ENMT: [ ] sore throat  [ ] thrush [ ] ulcers [ ] exudates  Respiratory: [ ] dyspnea [ ] hemoptysis [ ] cough [ ] sputum	  Cardiovascular:  [ ] chest pain [ ] palpitations [ ] edema	  Gastrointestinal:  [ ] nausea [ ] vomiting [ ] diarrhea [X] constipation x1 week [X] black stools [ ] pain	  Genitourinary:  [ ] dysuria [ ] frequency [ ] hematuria [ ] discharge [ ] flank pain  [ ] incontinence  Musculoskeletal:  [ ] myalgias [ ] arthralgias [ ] arthritis  [X] back pain 10/10 entire back  Neurological:  [ ] headache [X] H/O seizure disorder  [ ] confusion/altered mental status  Psychiatric:  [ ] anxiety [ ] depression	  Hematology/Lymphatics:  [ ] lymphadenopathy  Endocrine:  [ ] adrenal [ ] thyroid  Allergic/Immunologic:	 [ ] transplant [ ] seasonal    Vital Signs Last 24 Hrs  T(F): 97.8 (10-21-19 @ 09:08), Max: 98.1 (10-21-19 @ 08:56)  Vital Signs Last 24 Hrs  HR: 90 (10-21-19 @ 12:36) (86 - 91)  BP: 138/82 (10-21-19 @ 12:36) (123/84 - 156/83)  RR: 18 (10-21-19 @ 12:36)  SpO2: 99% (10-21-19 @ 12:36) (98% - 100%)  Wt(kg): --    PHYSICAL EXAM:  General: uncomfortable, in pain, lying on left side. Uncooperative with exam.  HEAD/EYES: anicteric, pupils poorly reactive to light. EOMI  ENT:  supple. No thrush.  Cardiovascular:   Refused exam  Respiratory:  Refused exam  GI:  Refused exam  :  Refused exam due to pain  Musculoskeletal:  Entire back acutely tender. Right foot midfoot amputation.  Neurologic:  grossly non-focal. No numbness or tingling in bilateral LE. Moves all 4 extremities spontaneously  Skin:  no rash  Lymph: no lymphadenopathy  Psychiatric:  appropriate affect                        8.3    9.26  )-----------( 401      ( 21 Oct 2019 09:40 )             26.8     10-21    135  |  100  |  11  ----------------------------<  184<H>  3.9   |  25  |  1.16    Ca    9.2      21 Oct 2019 09:40    TPro  8.9<H>  /  Alb  3.2<L>  /  TBili  0.3  /  DBili  x   /  AST  13  /  ALT  7<L>  /  AlkPhos  66  10-21    MICROBIOLOGY:  Sent to [facility] from [facility]. Not ready on 10/21. Call [phone] on 10/22    RADIOLOGY:  CT a/p from OSH    Could not tolerate MRI due to pain. Jakob Patel, PGY1  Internal Medicine, Infectious Diseases Service  Pager: [phone] (DARRICK), [phone] (NS)    HPI: 63 y.o. male with multiple chronic medical issues including DM, HTN, and epilepsy was transferred from [facility] for evaluation of a possible ? discitis / osteomyelitis following a mechanical fall landing on his tailbone when he tried to get up from his wheelchair 5 days ago. He is currently residing at a nursing facility for IV antibiotics via left PICC (Vanc / Zosyn) for right foot osteomyelitis.  Was seen at [place] ED after the fall and discharged home, but lower back pain persisted and worsened. He was seen at [facility] 10/20 where a CT abd/pelvis found an enlarged psoas muscle with ? abscesses with possible extension to the T12 L1 disc with a compression fracture suspicious for osteomyelitis.  He complains of fevers and chills, constipation x1 week. Denies abd pain, nausea or nausea/vomiting, numbness, or weakness.  Movement makes the pain worse, Dilaudid makes it better. Pt not very clear with timeline of abx, says PICC was placed a week ago at [facility]. He says he has been on abx since 2014 for the foot at different hospitals. Denies any fever or chills since the fall. No urinary or bowel incontinence.         PAST MEDICAL & SURGICAL HISTORY:  Diabetes  Hypertension  Osteomyelitis  Seizure disorder (?)  R mid-foot amputation  L 1st toe amputation    Allergies: denies    ANTIMICROBIALS (past 90 days)  MEDICATIONS  (STANDING):  meropenem  IVPB   100 mL/Hr IV Intermittent (10-21-19 @ 13:40)    ANTIMICROBIALS:      OTHER MEDS: MEDICATIONS  (STANDING):    SOCIAL HISTORY: Former smoker 37 pack years. Denies current drinking or recreational drug use.    FAMILY HISTORY: Father with Diabetes Mellitus    REVIEW OF SYSTEMS  [X] All other systems negative except as noted below:	    Constitutional:  [ ] fever [ ] chills  [X] weight loss  [ ] weakness  Skin:  [ ] rash [ ] phlebitis	  Eyes: [X] blurry vision [ ] pain  [ ] discharge	  ENMT: [ ] sore throat  [ ] thrush [ ] ulcers [ ] exudates  Respiratory: [ ] dyspnea [ ] hemoptysis [ ] cough [ ] sputum	  Cardiovascular:  [ ] chest pain [ ] palpitations [ ] edema	  Gastrointestinal:  [ ] nausea [ ] vomiting [ ] diarrhea [X] constipation 1 week ago, now resolved [ ] black stools [ ] pain	  Genitourinary:  [ ] dysuria [ ] frequency [ ] flank pain  [ ] incontinence  Musculoskeletal:  [ ] myalgias [ ] arthralgias [ ] arthritis  [X] back pain 10/10 entire back  Neurological:  [ ] headache [X] H/O seizure disorder  [ ] confusion/altered mental status  Psychiatric:  [ ] anxiety [ ] depression	  Endocrine:  [ ] adrenal [ ] thyroid  Allergic/Immunologic:	 [ ] transplant [ ] seasonal      Vital Signs Last 24 Hrs  T(F): 97.8 (10-21-19 @ 09:08), Max: 98.1 (10-21-19 @ 08:56)  Vital Signs Last 24 Hrs  HR: 90 (10-21-19 @ 12:36) (86 - 91)  BP: 138/82 (10-21-19 @ 12:36) (123/84 - 156/83)  RR: 18 (10-21-19 @ 12:36)  SpO2: 99% (10-21-19 @ 12:36) (98% - 100%)  Wt(kg): --    PHYSICAL EXAM:  General: uncomfortable, in pain, lying on left side.   HEAD/EYES: anicteric, pupils poorly reactive to light. EOMI  ENT:  supple. No thrush.  Cardiovascular: S1 S2 normal, tachy.   Respiratory: + air entry b/l.   GI: soft, nontender, non distended  : no bucio   Musculoskeletal:  Entire back acutely tender. Right foot midfoot amputation, plantar aspect with ulcer with granulation base. Lt foot with dorsal aspect ulcer with part fibrinous base, seems like exposed bone. No Malodor or discharge.   Neurologic:  grossly non-focal. Moves all 4 extremities spontaneously  Skin:  no rash  Extremities: + PICC   Psychiatric:  appropriate affect                            8.3    9.26  )-----------( 401      ( 21 Oct 2019 09:40 )             26.8     10-21    135  |  100  |  11  ----------------------------<  184<H>  3.9   |  25  |  1.16    Ca    9.2      21 Oct 2019 09:40    TPro  8.9<H>  /  Alb  3.2<L>  /  TBili  0.3  /  DBili  x   /  AST  13  /  ALT  7<L>  /  AlkPhos  66  10-21      MICROBIOLOGY:  Sent to [facility] from [facility]. Not ready on 10/21. Call [phone] on 10/22    RADIOLOGY:  CT a/p from OSH    < from: MR Lumbar Spine No Cont (10.21.19 @ 12:57) >  IMPRESSION: Limited examination secondary to premature termination of the   examination and also to only partial completion of the sagittal   T2-weighted sequence.    Acute/subacute mild compression superior endplate fracture deformity of   L1. Minimal bony retropulsion. No cauda equina or conus medullaris   compression.    Nonspecific T2 prolongation within the right psoas muscle which is only   visualized on the  localizer sequences. Findings may be related to   intramuscular hematoma or myositis. Clinical correlation is required.

## 2019-10-21 NOTE — H&P ADULT - HISTORY OF PRESENT ILLNESS
63 y.o. male with multiple chronic medical issues including DM, HTN, and epilepsy was transferred from [facility] for evaluation of a possible ? discitis / osteomyelitis following a mechanical fall landing on his tailbone when he tried to get up from his wheelchair 5 days ago. He is currently residing at a nursing facility for IV antibiotics via left PICC (Vanc / Zosyn) for right foot osteomyelitis.  Was seen at [place] ED after the fall and discharged home, but lower back pain persisted and worsened. He was seen at [facility] 10/20 where a CT abd/pelvis found an enlarged psoas muscle with ? abscesses with possible extension to the T12 L1 disc with a compression fracture suspicious for osteomyelitis.  He complains of fevers and chills, constipation x1 week. Denies abd pain, nausea or nausea/vomiting, numbness, or weakness.  Movement makes the pain worse, Dilaudid makes it better. Pt not very clear with timeline of abx, says PICC was placed a week ago at [facility]. He says he has been on abx since 2014 for the foot at different hospitals. Denies any fever or chills since the fall. No urinary or bowel incontinence.

## 2019-10-22 DIAGNOSIS — E11.9 TYPE 2 DIABETES MELLITUS WITHOUT COMPLICATIONS: ICD-10-CM

## 2019-10-22 DIAGNOSIS — I82.629 ACUTE EMBOLISM AND THROMBOSIS OF DEEP VEINS OF UNSPECIFIED UPPER EXTREMITY: ICD-10-CM

## 2019-10-22 DIAGNOSIS — D50.0 IRON DEFICIENCY ANEMIA SECONDARY TO BLOOD LOSS (CHRONIC): ICD-10-CM

## 2019-10-22 LAB
GLUCOSE BLDC GLUCOMTR-MCNC: 148 MG/DL — HIGH (ref 70–99)
GLUCOSE BLDC GLUCOMTR-MCNC: 157 MG/DL — HIGH (ref 70–99)
GLUCOSE BLDC GLUCOMTR-MCNC: 202 MG/DL — HIGH (ref 70–99)
GLUCOSE BLDC GLUCOMTR-MCNC: 280 MG/DL — HIGH (ref 70–99)
HBA1C BLD-MCNC: 10.1 % — HIGH (ref 4–5.6)
HCT VFR BLD CALC: 26 % — LOW (ref 39–50)
HCV AB S/CO SERPL IA: 14.92 S/CO — HIGH (ref 0–0.99)
HCV AB SERPL-IMP: REACTIVE
HGB BLD-MCNC: 8.2 G/DL — LOW (ref 13–17)
MCHC RBC-ENTMCNC: 27.5 PG — SIGNIFICANT CHANGE UP (ref 27–34)
MCHC RBC-ENTMCNC: 31.5 GM/DL — LOW (ref 32–36)
MCV RBC AUTO: 87.2 FL — SIGNIFICANT CHANGE UP (ref 80–100)
NRBC # BLD: 0 /100 WBCS — SIGNIFICANT CHANGE UP (ref 0–0)
PLATELET # BLD AUTO: 316 K/UL — SIGNIFICANT CHANGE UP (ref 150–400)
RBC # BLD: 2.98 M/UL — LOW (ref 4.2–5.8)
RBC # FLD: 15 % — HIGH (ref 10.3–14.5)
WBC # BLD: 7.08 K/UL — SIGNIFICANT CHANGE UP (ref 3.8–10.5)
WBC # FLD AUTO: 7.08 K/UL — SIGNIFICANT CHANGE UP (ref 3.8–10.5)

## 2019-10-22 PROCEDURE — 99232 SBSQ HOSP IP/OBS MODERATE 35: CPT

## 2019-10-22 PROCEDURE — 73630 X-RAY EXAM OF FOOT: CPT | Mod: 26,LT

## 2019-10-22 RX ORDER — FERROUS SULFATE 325(65) MG
1 TABLET ORAL
Qty: 0 | Refills: 0 | DISCHARGE

## 2019-10-22 RX ORDER — ACETAMINOPHEN 500 MG
1000 TABLET ORAL ONCE
Refills: 0 | Status: COMPLETED | OUTPATIENT
Start: 2019-10-22 | End: 2019-10-22

## 2019-10-22 RX ORDER — ACETAMINOPHEN 500 MG
1 TABLET ORAL
Qty: 0 | Refills: 0 | DISCHARGE

## 2019-10-22 RX ADMIN — Medication 400 MILLIGRAM(S): at 21:54

## 2019-10-22 RX ADMIN — INSULIN GLARGINE 26 UNIT(S): 100 INJECTION, SOLUTION SUBCUTANEOUS at 22:07

## 2019-10-22 RX ADMIN — GABAPENTIN 100 MILLIGRAM(S): 400 CAPSULE ORAL at 06:34

## 2019-10-22 RX ADMIN — LEVETIRACETAM 500 MILLIGRAM(S): 250 TABLET, FILM COATED ORAL at 06:34

## 2019-10-22 RX ADMIN — GABAPENTIN 100 MILLIGRAM(S): 400 CAPSULE ORAL at 22:08

## 2019-10-22 RX ADMIN — HYDROMORPHONE HYDROCHLORIDE 1 MILLIGRAM(S): 2 INJECTION INTRAMUSCULAR; INTRAVENOUS; SUBCUTANEOUS at 07:05

## 2019-10-22 RX ADMIN — Medication 325 MILLIGRAM(S): at 12:36

## 2019-10-22 RX ADMIN — DIVALPROEX SODIUM 500 MILLIGRAM(S): 500 TABLET, DELAYED RELEASE ORAL at 12:36

## 2019-10-22 RX ADMIN — Medication 1 MILLIGRAM(S): at 12:36

## 2019-10-22 RX ADMIN — Medication 10 UNIT(S): at 12:36

## 2019-10-22 RX ADMIN — Medication 3: at 08:59

## 2019-10-22 RX ADMIN — OXYCODONE AND ACETAMINOPHEN 2 TABLET(S): 5; 325 TABLET ORAL at 11:36

## 2019-10-22 RX ADMIN — Medication 10 UNIT(S): at 09:00

## 2019-10-22 RX ADMIN — APIXABAN 5 MILLIGRAM(S): 2.5 TABLET, FILM COATED ORAL at 17:35

## 2019-10-22 RX ADMIN — AMLODIPINE BESYLATE 10 MILLIGRAM(S): 2.5 TABLET ORAL at 06:34

## 2019-10-22 RX ADMIN — PIPERACILLIN AND TAZOBACTAM 25 GRAM(S): 4; .5 INJECTION, POWDER, LYOPHILIZED, FOR SOLUTION INTRAVENOUS at 21:54

## 2019-10-22 RX ADMIN — HYDROMORPHONE HYDROCHLORIDE 1 MILLIGRAM(S): 2 INJECTION INTRAMUSCULAR; INTRAVENOUS; SUBCUTANEOUS at 10:47

## 2019-10-22 RX ADMIN — OXYCODONE AND ACETAMINOPHEN 2 TABLET(S): 5; 325 TABLET ORAL at 04:50

## 2019-10-22 RX ADMIN — HYDROMORPHONE HYDROCHLORIDE 1 MILLIGRAM(S): 2 INJECTION INTRAMUSCULAR; INTRAVENOUS; SUBCUTANEOUS at 15:56

## 2019-10-22 RX ADMIN — CHLORHEXIDINE GLUCONATE 1 APPLICATION(S): 213 SOLUTION TOPICAL at 06:31

## 2019-10-22 RX ADMIN — HYDROMORPHONE HYDROCHLORIDE 1 MILLIGRAM(S): 2 INJECTION INTRAMUSCULAR; INTRAVENOUS; SUBCUTANEOUS at 21:00

## 2019-10-22 RX ADMIN — HYDROMORPHONE HYDROCHLORIDE 1 MILLIGRAM(S): 2 INJECTION INTRAMUSCULAR; INTRAVENOUS; SUBCUTANEOUS at 02:56

## 2019-10-22 RX ADMIN — PIPERACILLIN AND TAZOBACTAM 25 GRAM(S): 4; .5 INJECTION, POWDER, LYOPHILIZED, FOR SOLUTION INTRAVENOUS at 10:47

## 2019-10-22 RX ADMIN — Medication 10 UNIT(S): at 17:35

## 2019-10-22 RX ADMIN — OXYCODONE AND ACETAMINOPHEN 2 TABLET(S): 5; 325 TABLET ORAL at 11:37

## 2019-10-22 RX ADMIN — HYDROMORPHONE HYDROCHLORIDE 1 MILLIGRAM(S): 2 INJECTION INTRAMUSCULAR; INTRAVENOUS; SUBCUTANEOUS at 02:26

## 2019-10-22 RX ADMIN — PIPERACILLIN AND TAZOBACTAM 25 GRAM(S): 4; .5 INJECTION, POWDER, LYOPHILIZED, FOR SOLUTION INTRAVENOUS at 02:23

## 2019-10-22 RX ADMIN — GABAPENTIN 100 MILLIGRAM(S): 400 CAPSULE ORAL at 12:36

## 2019-10-22 RX ADMIN — Medication 1: at 12:37

## 2019-10-22 RX ADMIN — APIXABAN 5 MILLIGRAM(S): 2.5 TABLET, FILM COATED ORAL at 06:34

## 2019-10-22 RX ADMIN — HYDROMORPHONE HYDROCHLORIDE 1 MILLIGRAM(S): 2 INJECTION INTRAMUSCULAR; INTRAVENOUS; SUBCUTANEOUS at 06:31

## 2019-10-22 RX ADMIN — OXYCODONE AND ACETAMINOPHEN 2 TABLET(S): 5; 325 TABLET ORAL at 05:20

## 2019-10-22 RX ADMIN — HYDROMORPHONE HYDROCHLORIDE 1 MILLIGRAM(S): 2 INJECTION INTRAMUSCULAR; INTRAVENOUS; SUBCUTANEOUS at 11:35

## 2019-10-22 RX ADMIN — LEVETIRACETAM 500 MILLIGRAM(S): 250 TABLET, FILM COATED ORAL at 17:35

## 2019-10-22 RX ADMIN — MIRTAZAPINE 30 MILLIGRAM(S): 45 TABLET, ORALLY DISINTEGRATING ORAL at 12:36

## 2019-10-22 RX ADMIN — HYDROMORPHONE HYDROCHLORIDE 1 MILLIGRAM(S): 2 INJECTION INTRAMUSCULAR; INTRAVENOUS; SUBCUTANEOUS at 19:27

## 2019-10-22 RX ADMIN — Medication 250 MILLIGRAM(S): at 06:34

## 2019-10-22 RX ADMIN — Medication 1000 MILLIGRAM(S): at 22:00

## 2019-10-22 RX ADMIN — Medication 250 MILLIGRAM(S): at 17:35

## 2019-10-22 RX ADMIN — HYDROMORPHONE HYDROCHLORIDE 1 MILLIGRAM(S): 2 INJECTION INTRAMUSCULAR; INTRAVENOUS; SUBCUTANEOUS at 16:10

## 2019-10-22 NOTE — PROGRESS NOTE ADULT - SUBJECTIVE AND OBJECTIVE BOX
Patient is a 63y old  Male who presents with a chief complaint of Back pain (22 Oct 2019 08:44)      SUBJECTIVE / OVERNIGHT EVENTS:  C/o severe back pain, wants a higher dose Dilaudid Afebrile  Review of Systems:   CONSTITUTIONAL: No fever, weight loss, or fatigue  EYES: No eye pain, visual disturbances, or discharge  ENMT:  No difficulty hearing, tinnitus, vertigo; No sinus or throat pain  NECK: No pain or stiffness  BREASTS: No pain, masses, or nipple discharge  RESPIRATORY: No cough, wheezing, chills or hemoptysis; No shortness of breath  CARDIOVASCULAR: No chest pain, palpitations, dizziness, or leg swelling  GASTROINTESTINAL: No abdominal or epigastric pain. No nausea, vomiting, or hematemesis; No diarrhea or constipation. No melena or hematochezia.  GENITOURINARY: No dysuria, frequency, hematuria, or incontinence  NEUROLOGICAL: No headaches, memory loss, loss of strength, numbness, or tremors  SKIN: No itching, burning, rashes, or lesions   LYMPH NODES: No enlarged glands  ENDOCRINE: No heat or cold intolerance; No hair loss  MUSCULOSKELETAL: No joint pain or swelling;   PSYCHIATRIC: No depression, anxiety, mood swings, or difficulty sleeping  HEME/LYMPH: No easy bruising, or bleeding gums  ALLERY AND IMMUNOLOGIC: No hives or eczema    MEDICATIONS  (STANDING):  amLODIPine   Tablet 10 milliGRAM(s) Oral daily  apixaban 5 milliGRAM(s) Oral every 12 hours  chlorhexidine 4% Liquid 1 Application(s) Topical <User Schedule>  dextrose 5%. 1000 milliLiter(s) (50 mL/Hr) IV Continuous <Continuous>  dextrose 50% Injectable 12.5 Gram(s) IV Push once  dextrose 50% Injectable 25 Gram(s) IV Push once  dextrose 50% Injectable 25 Gram(s) IV Push once  diVALproex  milliGRAM(s) Oral daily  ferrous    sulfate 325 milliGRAM(s) Oral daily  folic acid 1 milliGRAM(s) Oral daily  gabapentin 100 milliGRAM(s) Oral three times a day  insulin glargine Injectable (LANTUS) 26 Unit(s) SubCutaneous at bedtime  insulin lispro (HumaLOG) corrective regimen sliding scale   SubCutaneous three times a day before meals  insulin lispro (HumaLOG) corrective regimen sliding scale   SubCutaneous at bedtime  insulin lispro Injectable (HumaLOG) 10 Unit(s) SubCutaneous three times a day before meals  levETIRAcetam 500 milliGRAM(s) Oral two times a day  mirtazapine 30 milliGRAM(s) Oral daily  piperacillin/tazobactam IVPB.. 3.375 Gram(s) IV Intermittent every 8 hours  tiotropium 18 MICROgram(s) Capsule 1 Capsule(s) Inhalation daily  vancomycin  IVPB 1000 milliGRAM(s) IV Intermittent every 12 hours    MEDICATIONS  (PRN):  acetaminophen   Tablet .. 325 milliGRAM(s) Oral daily PRN Temp greater or equal to 38C (100.4F), Mild Pain (1 - 3)  ALBUTerol    90 MICROgram(s) HFA Inhaler 2 Puff(s) Inhalation every 6 hours PRN Shortness of Breath and/or Wheezing  dextrose 40% Gel 15 Gram(s) Oral once PRN Blood Glucose LESS THAN 70 milliGRAM(s)/deciliter  glucagon  Injectable 1 milliGRAM(s) IntraMuscular once PRN Glucose LESS THAN 70 milligrams/deciliter  HYDROmorphone  Injectable 1 milliGRAM(s) IV Push every 4 hours PRN Severe Pain (7 - 10)  oxyCODONE    5 mG/acetaminophen 325 mG 2 Tablet(s) Oral every 4 hours PRN Moderate Pain (4 - 6)  sodium chloride 0.9% lock flush 10 milliLiter(s) IV Push every 1 hour PRN Pre/post blood products, medications, blood draw, and to maintain line patency      PHYSICAL EXAM:  Vital Signs Last 24 Hrs  T(C): 36.8 (22 Oct 2019 09:12), Max: 37.3 (21 Oct 2019 15:24)  T(F): 98.2 (22 Oct 2019 09:12), Max: 99.1 (21 Oct 2019 15:24)  HR: 89 (22 Oct 2019 09:12) (88 - 99)  BP: 162/87 (22 Oct 2019 09:12) (144/74 - 162/87)  BP(mean): --  RR: 18 (22 Oct 2019 09:12) (18 - 18)  SpO2: 98% (22 Oct 2019 09:12) (96% - 98%)  I&O's Summary    21 Oct 2019 07:01  -  22 Oct 2019 07:00  --------------------------------------------------------  IN: 720 mL / OUT: 650 mL / NET: 70 mL    22 Oct 2019 07:01  -  22 Oct 2019 12:52  --------------------------------------------------------  IN: 600 mL / OUT: 450 mL / NET: 150 mL      GENERAL: NAD, well-developed  HEAD:  Atraumatic, Normocephalic  EYES: EOMI, PERRLA, conjunctiva and sclera clear  NECK: Supple, No JVD  CHEST/LUNG: Clear to auscultation bilaterally; No wheeze  HEART: Regular rate and rhythm; No murmurs, rubs, or gallops  ABDOMEN: Soft, Nontender, Nondistended; Bowel sounds present  EXTREMITIES:  elda TMA  PSYCH: AAOx3  NEUROLOGY: non-focal  SKIN: No rashes or lesions    LABS:  CAPILLARY BLOOD GLUCOSE      POCT Blood Glucose.: 157 mg/dL (22 Oct 2019 11:59)  POCT Blood Glucose.: 280 mg/dL (22 Oct 2019 08:57)  POCT Blood Glucose.: 299 mg/dL (21 Oct 2019 22:05)  POCT Blood Glucose.: 260 mg/dL (21 Oct 2019 17:11)                          8.3    9.26  )-----------( 401      ( 21 Oct 2019 09:40 )             26.8     10-21    135  |  100  |  11  ----------------------------<  184<H>  3.9   |  25  |  1.16    Ca    9.2      21 Oct 2019 09:40    TPro  8.9<H>  /  Alb  3.2<L>  /  TBili  0.3  /  DBili  x   /  AST  13  /  ALT  7<L>  /  AlkPhos  66  10-21    PT/INR - ( 21 Oct 2019 09:40 )   PT: 12.6 sec;   INR: 1.09 ratio         PTT - ( 21 Oct 2019 09:40 )  PTT:30.3 sec          RADIOLOGY & ADDITIONAL TESTS:    Imaging Personally Reviewed:    Consultant(s) Notes Reviewed:      Care Discussed with Consultants/Other Providers:

## 2019-10-22 NOTE — PROGRESS NOTE ADULT - SUBJECTIVE AND OBJECTIVE BOX
Patient seen and examined at bedside.    --Anticoagulation--  apixaban 5 milliGRAM(s) Oral every 12 hours    T(C): 36.6 (10-22-19 @ 04:49), Max: 37.3 (10-21-19 @ 15:24)  HR: 88 (10-22-19 @ 04:49) (86 - 99)  BP: 160/95 (10-22-19 @ 04:49) (123/84 - 160/95)  RR: 18 (10-22-19 @ 04:49) (18 - 20)  SpO2: 98% (10-22-19 @ 04:49) (96% - 100%)  Wt(kg): --    Exam:  AOx3, Following Commands  Motor:          Upper extremity                       Delt      Bicep     Tricep     HG                                                 L         5/5        5/5          5/5        5/5                                               R          5/5       5/5          5/5        5/5          Lower extremity                           HF          KF         KE         DF        PF                                                  L           5/5         5/5        5/5       5/5        5/5                                               R           5/5         5/5        5/5       5/5        5/5  Sensation / Reflexes  [x] intact to light touch  [ ] decreased:   No clonus

## 2019-10-22 NOTE — CONSULT NOTE ADULT - SUBJECTIVE AND OBJECTIVE BOX
HPI:  63 y.o. male with multiple chronic medical issues including DM, HTN, and epilepsy was transferred from [facility] for evaluation of a possible ? discitis / osteomyelitis following a mechanical fall landing on his tailbone when he tried to get up from his wheelchair 5 days ago. He is currently residing at a nursing facility for IV antibiotics via left PICC (Vanc / Zosyn) for right foot osteomyelitis.  Was seen at [place] ED after the fall and discharged home, but lower back pain persisted and worsened. He was seen at [facility] 10/20 where a CT abd/pelvis found an enlarged psoas muscle with ? abscesses with possible extension to the T12 L1 disc with a compression fracture suspicious for osteomyelitis.  He complains of fevers and chills, constipation x1 week. Denies abd pain, nausea or nausea/vomiting, numbness, or weakness.  Movement makes the pain worse, Dilaudid makes it better. Pt not very clear with timeline of abx, says PICC was placed a week ago at [facility]. He says he has been on abx since 2014 for the foot at different hospitals. Denies any fever or chills since the fall. No urinary or bowel incontinence. (21 Oct 2019 20:01)  Patient has history of diabetes, on oral meds at home, no recent hypoglycemic episodes, no polyuria polydipsia. Patient follows up with PCP for diabetes management.    PAST MEDICAL & SURGICAL HISTORY:  Diabetes  Osteomyelitis      FAMILY HISTORY:      Social History:    Outpatient Medications:    MEDICATIONS  (STANDING):  amLODIPine   Tablet 10 milliGRAM(s) Oral daily  apixaban 5 milliGRAM(s) Oral every 12 hours  chlorhexidine 4% Liquid 1 Application(s) Topical <User Schedule>  dextrose 5%. 1000 milliLiter(s) (50 mL/Hr) IV Continuous <Continuous>  dextrose 50% Injectable 12.5 Gram(s) IV Push once  dextrose 50% Injectable 25 Gram(s) IV Push once  dextrose 50% Injectable 25 Gram(s) IV Push once  diVALproex  milliGRAM(s) Oral daily  ferrous    sulfate 325 milliGRAM(s) Oral daily  folic acid 1 milliGRAM(s) Oral daily  gabapentin 100 milliGRAM(s) Oral three times a day  insulin glargine Injectable (LANTUS) 26 Unit(s) SubCutaneous at bedtime  insulin lispro (HumaLOG) corrective regimen sliding scale   SubCutaneous three times a day before meals  insulin lispro (HumaLOG) corrective regimen sliding scale   SubCutaneous at bedtime  insulin lispro Injectable (HumaLOG) 10 Unit(s) SubCutaneous three times a day before meals  levETIRAcetam 500 milliGRAM(s) Oral two times a day  mirtazapine 30 milliGRAM(s) Oral daily  piperacillin/tazobactam IVPB.. 3.375 Gram(s) IV Intermittent every 8 hours  tiotropium 18 MICROgram(s) Capsule 1 Capsule(s) Inhalation daily  vancomycin  IVPB 1000 milliGRAM(s) IV Intermittent every 12 hours    MEDICATIONS  (PRN):  acetaminophen   Tablet .. 325 milliGRAM(s) Oral daily PRN Temp greater or equal to 38C (100.4F), Mild Pain (1 - 3)  ALBUTerol    90 MICROgram(s) HFA Inhaler 2 Puff(s) Inhalation every 6 hours PRN Shortness of Breath and/or Wheezing  dextrose 40% Gel 15 Gram(s) Oral once PRN Blood Glucose LESS THAN 70 milliGRAM(s)/deciliter  glucagon  Injectable 1 milliGRAM(s) IntraMuscular once PRN Glucose LESS THAN 70 milligrams/deciliter  HYDROmorphone  Injectable 1 milliGRAM(s) IV Push every 4 hours PRN Severe Pain (7 - 10)  oxyCODONE    5 mG/acetaminophen 325 mG 2 Tablet(s) Oral every 4 hours PRN Moderate Pain (4 - 6)  sodium chloride 0.9% lock flush 10 milliLiter(s) IV Push every 1 hour PRN Pre/post blood products, medications, blood draw, and to maintain line patency      Allergies    Allergy Status Unknown    Intolerances      Review of Systems:  Constitutional: No fever, no chills  Eyes: No blurry vision  Neuro: No tremors  HEENT: No pain, no neck swelling  Cardiovascular: No chest pain, no palpitations  Respiratory: Has SOB, no cough  GI: No nausea, vomiting, abdominal pain  : No dysuria  Skin: no rash  MSK: Has leg swelling.  Psych: no depression  Endocrine: no polyuria, polydipsia    ALL OTHER SYSTEMS REVIEWED AND NEGATIVE    UNABLE TO OBTAIN    PHYSICAL EXAM:  VITALS: T(C): 36.8 (10-22-19 @ 19:55)  T(F): 98.2 (10-22-19 @ 19:55), Max: 98.4 (10-22-19 @ 13:26)  HR: 90 (10-22-19 @ 19:55) (86 - 92)  BP: 157/87 (10-22-19 @ 19:55) (144/85 - 162/87)  RR:  (18 - 18)  SpO2:  (97% - 99%)  Wt(kg): --  GENERAL: NAD, well-groomed, well-developed  EYES: No proptosis, no lid lag  HEENT:  Atraumatic, Normocephalic  THYROID: Normal size, no palpable nodules  RESPIRATORY: Clear to auscultation bilaterally; No rales, rhonchi, wheezing  CARDIOVASCULAR: Si S2, No murmurs;  GI: Soft, non distended, normal bowel sounds  SKIN: Dry, intact, No rashes or lesions  MUSCULOSKELETAL: Has BL lower extremity edema.  NEURO:  no tremor, sensation decreased in feet BL,    POCT Blood Glucose.: 202 mg/dL (10-22-19 @ 21:54)  POCT Blood Glucose.: 148 mg/dL (10-22-19 @ 17:23)  POCT Blood Glucose.: 157 mg/dL (10-22-19 @ 11:59)  POCT Blood Glucose.: 280 mg/dL (10-22-19 @ 08:57)  POCT Blood Glucose.: 299 mg/dL (10-21-19 @ 22:05)  POCT Blood Glucose.: 260 mg/dL (10-21-19 @ 17:11)                            8.2    7.08  )-----------( 316      ( 22 Oct 2019 14:07 )             26.0       10-21    135  |  100  |  11  ----------------------------<  184<H>  3.9   |  25  |  1.16    EGFR if : 77  EGFR if non : 67    Ca    9.2      10-21    TPro  8.9<H>  /  Alb  3.2<L>  /  TBili  0.3  /  DBili  x   /  AST  13  /  ALT  7<L>  /  AlkPhos  66  10-21      Thyroid Function Tests:      Hemoglobin A1C, Whole Blood: 10.1 % <H> [4.0 - 5.6] (10-22-19 @ 09:40)          Radiology:

## 2019-10-22 NOTE — PROGRESS NOTE ADULT - ASSESSMENT
64 yo male with DM, HTN, and epilepsy was transferred from [facility] for possible T12-L1 discitis / osteomyelitis following a mechanical fall 5 days ago. Currently receiving IV Vanc/Zosyn for right foot osteomyelitis. Was seen at [facility] 10/20 where a CT abd/pelvis found an enlarged psoas muscle with ? abscesses with possible extension to the T12 L1 disc with a compression fracture suspicious for osteomyelitis.      Overall foot OM, concern for psoas abscess, spinal OM, elevated ESR. Uncontrolled DM.       Plan:   - MRI with contrast pending   - f/u BCx, NTD.   - c/w Zosyn,   - C/W Vanc 1g q12. Per patient, has completed more than a week therapy.   - check trough prior to 4th dose.  - S/p Neurosurgery eval   - s/p podiatry eval.

## 2019-10-22 NOTE — PROGRESS NOTE ADULT - SUBJECTIVE AND OBJECTIVE BOX
63y old  Male who presents with a chief complaint of Back pain (22 Oct 2019 12:52)      Interval history:  Afebrile, complains of back pain.     Allergy Status Unknown    Antimicrobials:  piperacillin/tazobactam IVPB.. 3.375 Gram(s) IV Intermittent every 8 hours  vancomycin  IVPB 1000 milliGRAM(s) IV Intermittent every 12 hours      REVIEW OF SYSTEMS:  No chest pain  No cough,   No N/V  No dysuria  No rash.       Vital Signs Last 24 Hrs  T(C): 36.9 (10-22-19 @ 13:26), Max: 37.1 (10-21-19 @ 20:05)  T(F): 98.4 (10-22-19 @ 13:26), Max: 98.7 (10-21-19 @ 20:05)  HR: 86 (10-22-19 @ 13:26) (86 - 93)  BP: 154/76 (10-22-19 @ 13:26) (144/74 - 162/87)  BP(mean): --  RR: 18 (10-22-19 @ 13:26) (18 - 18)  SpO2: 97% (10-22-19 @ 13:26) (96% - 98%)      PHYSICAL EXAM:  Patient in no acute distress. Alert, awake.   No icterus, no oral ulcers.  Cardiovascular: S1S2 normal.  Lungs: + air entry B/L lung fields.  Gastrointestinal: soft, nontender, nondistended.  Extremities: no edema.  + PICC   b/l foot ulcers.                           8.2    7.08  )-----------( 316      ( 22 Oct 2019 14:07 )             26.0   10-21    135  |  100  |  11  ----------------------------<  184<H>  3.9   |  25  |  1.16    Ca    9.2      21 Oct 2019 09:40    TPro  8.9<H>  /  Alb  3.2<L>  /  TBili  0.3  /  DBili  x   /  AST  13  /  ALT  7<L>  /  AlkPhos  66  10-21      LIVER FUNCTIONS - ( 21 Oct 2019 09:40 )  Alb: 3.2 g/dL / Pro: 8.9 g/dL / ALK PHOS: 66 U/L / ALT: 7 U/L / AST: 13 U/L / GGT: x             Culture - Blood (collected 21 Oct 2019 13:45)  Source: .Blood  Preliminary Report (22 Oct 2019 14:01):    No growth to date.    Culture - Blood (collected 21 Oct 2019 13:45)  Source: .Blood  Preliminary Report (22 Oct 2019 14:01):    No growth to date.      Radiology:  < from: Xray Foot AP + Lateral + Oblique, Left (10.22.19 @ 10:04) >  Impression: Status post first ray removal. The second digit is dislocated   medially and displaced proximally. There has been a resection from the   mid fifth metatarsal to the base of the fifth middle phalanx. There is   soft tissue lucency beneath the third metatarsal head. There is a   slightly sclerotic appearance to the third metatarsal which is   nonspecific although chronic osteomyelitis in the differential. MRI can   be performed for further evaluation if clinically warranted.

## 2019-10-22 NOTE — PROGRESS NOTE ADULT - ASSESSMENT
63M with bilateral foot ulcerations - chronic   -pt seen and evaluated   -chronic ulcerations of bilateral feet both probing to bone but no acute signs of infection   -Pt has had multiple operations for his feet and states is currently on a PICC line - unclear exactly when it was placed. States has been going to wound care center at [place] and wounds have been slowly improving   -recommend obtaining records from [place] to show what pt was PICCd on / duration   -pt uninterested in any further amputation at this time, would like to continue local wound care and abx regimen  -No plan for podiatric surgical intervention   -Continue with santyl + DSD daily b/l   -Recommend Zflows to be worn while in bed   -Will continue to follow   -seen by attending

## 2019-10-22 NOTE — PROGRESS NOTE ADULT - SUBJECTIVE AND OBJECTIVE BOX
Patient is a 63y old  Male who presents with a chief complaint of Back pain (22 Oct 2019 05:47)       INTERVAL HPI/OVERNIGHT EVENTS:  Patient seen and evaluated at bedside.  Pt is resting comfortable in NAD. Denies N/V/F/C.     Allergies    Allergy Status Unknown    Intolerances        Vital Signs Last 24 Hrs  T(C): 36.6 (22 Oct 2019 04:49), Max: 37.3 (21 Oct 2019 15:24)  T(F): 97.9 (22 Oct 2019 04:49), Max: 99.1 (21 Oct 2019 15:24)  HR: 88 (22 Oct 2019 04:49) (86 - 99)  BP: 160/95 (22 Oct 2019 04:49) (123/84 - 160/95)  BP(mean): --  RR: 18 (22 Oct 2019 04:49) (18 - 20)  SpO2: 98% (22 Oct 2019 04:49) (96% - 100%)    LABS:                        8.3    9.26  )-----------( 401      ( 21 Oct 2019 09:40 )             26.8     10-21    135  |  100  |  11  ----------------------------<  184<H>  3.9   |  25  |  1.16    Ca    9.2      21 Oct 2019 09:40    TPro  8.9<H>  /  Alb  3.2<L>  /  TBili  0.3  /  DBili  x   /  AST  13  /  ALT  7<L>  /  AlkPhos  66  10-21    PT/INR - ( 21 Oct 2019 09:40 )   PT: 12.6 sec;   INR: 1.09 ratio         PTT - ( 21 Oct 2019 09:40 )  PTT:30.3 sec    CAPILLARY BLOOD GLUCOSE      POCT Blood Glucose.: 299 mg/dL (21 Oct 2019 22:05)  POCT Blood Glucose.: 260 mg/dL (21 Oct 2019 17:11)      Lower Extremity Physical Exam:  Vasular: DP/PT 2/4, B/L, non palpable DP Right foot, CFT < 3 seconds L, Temperature gradient wnl B/L.   Neuro: Epicritic sensation absent to the level of ankle B/L.  Musculoskeletal/Ortho: s/p Right foot choparts amputation and Left foot 1st toe amp  Skin: Right foot plantar Right foot stump ulceration, mostly granular tissue with track to bone, no purulence, no malodor, no cellulitis, no signs of infection   Left foot dorsal and plantar 3rd metatarsal head ulcer with fibrogranular base, probes to bone, no purulence/malodor/signs of infection    RADIOLOGY & ADDITIONAL TESTS:

## 2019-10-22 NOTE — CONSULT NOTE ADULT - ASSESSMENT
Assessment  DMT2: 63y Male with DM T2 with hyperglycemia was on oral meds at home, started on basal bolus insulin, blood sugars trending down, no hypoglycemic episode,  eating meals,  non compliant with low carb diet.  Abscess on medications, afebrile, stable, monitored.  HTN: Controlled,  on antihypertensive medications.        Jose Alatorre MD  Cell: [phone]  Office: [phone]

## 2019-10-22 NOTE — PROGRESS NOTE ADULT - ASSESSMENT
Matthew Cope  63M  with multiple medical comorbidities with R TMA ( with active osteomyelitis)  and L 1st toe amputation currently on Iv vanc/zosyn (unclear duration). patient had a fall 5 days ago. Seen at [facility] ED then and discharged. CT abd pelvis shows psoas ?abscess vs hematoma, with L1 compression fx.  On exam neurologically intact with back pain controlled on IV medication  - no acute Nerurosurgical intervention. Would recommend MRI WWO contrast. patient unable to tolerate at this point. Would recommend under anesthesia if unable to tolerate with adequate pain control  - currently on vanc zosyn

## 2019-10-23 DIAGNOSIS — K92.2 GASTROINTESTINAL HEMORRHAGE, UNSPECIFIED: ICD-10-CM

## 2019-10-23 DIAGNOSIS — B18.2 CHRONIC VIRAL HEPATITIS C: ICD-10-CM

## 2019-10-23 DIAGNOSIS — Z71.89 OTHER SPECIFIED COUNSELING: ICD-10-CM

## 2019-10-23 DIAGNOSIS — M86.9 OSTEOMYELITIS, UNSPECIFIED: ICD-10-CM

## 2019-10-23 LAB
ANION GAP SERPL CALC-SCNC: 10 MMOL/L — SIGNIFICANT CHANGE UP (ref 5–17)
BUN SERPL-MCNC: 9 MG/DL — SIGNIFICANT CHANGE UP (ref 7–23)
CALCIUM SERPL-MCNC: 8.5 MG/DL — SIGNIFICANT CHANGE UP (ref 8.4–10.5)
CHLORIDE SERPL-SCNC: 102 MMOL/L — SIGNIFICANT CHANGE UP (ref 96–108)
CO2 SERPL-SCNC: 23 MMOL/L — SIGNIFICANT CHANGE UP (ref 22–31)
CREAT SERPL-MCNC: 0.9 MG/DL — SIGNIFICANT CHANGE UP (ref 0.5–1.3)
GLUCOSE BLDC GLUCOMTR-MCNC: 108 MG/DL — HIGH (ref 70–99)
GLUCOSE BLDC GLUCOMTR-MCNC: 150 MG/DL — HIGH (ref 70–99)
GLUCOSE BLDC GLUCOMTR-MCNC: 212 MG/DL — HIGH (ref 70–99)
GLUCOSE BLDC GLUCOMTR-MCNC: 214 MG/DL — HIGH (ref 70–99)
GLUCOSE BLDC GLUCOMTR-MCNC: 227 MG/DL — HIGH (ref 70–99)
GLUCOSE BLDC GLUCOMTR-MCNC: 98 MG/DL — SIGNIFICANT CHANGE UP (ref 70–99)
GLUCOSE SERPL-MCNC: 162 MG/DL — HIGH (ref 70–99)
HCT VFR BLD CALC: 24.2 % — LOW (ref 39–50)
HCV RNA FLD QL NAA+PROBE: SIGNIFICANT CHANGE UP
HCV RNA SPEC QL PROBE+SIG AMP: SIGNIFICANT CHANGE UP
HGB BLD-MCNC: 7.4 G/DL — LOW (ref 13–17)
MCHC RBC-ENTMCNC: 26.9 PG — LOW (ref 27–34)
MCHC RBC-ENTMCNC: 30.6 GM/DL — LOW (ref 32–36)
MCV RBC AUTO: 88 FL — SIGNIFICANT CHANGE UP (ref 80–100)
PLATELET # BLD AUTO: 286 K/UL — SIGNIFICANT CHANGE UP (ref 150–400)
POTASSIUM SERPL-MCNC: 3.5 MMOL/L — SIGNIFICANT CHANGE UP (ref 3.5–5.3)
POTASSIUM SERPL-SCNC: 3.5 MMOL/L — SIGNIFICANT CHANGE UP (ref 3.5–5.3)
RBC # BLD: 2.75 M/UL — LOW (ref 4.2–5.8)
RBC # FLD: 15 % — HIGH (ref 10.3–14.5)
SODIUM SERPL-SCNC: 135 MMOL/L — SIGNIFICANT CHANGE UP (ref 135–145)
VANCOMYCIN TROUGH SERPL-MCNC: 10.7 UG/ML — SIGNIFICANT CHANGE UP (ref 10–20)
VANCOMYCIN TROUGH SERPL-MCNC: 7.6 UG/ML — LOW (ref 10–20)
WBC # BLD: 7.74 K/UL — SIGNIFICANT CHANGE UP (ref 3.8–10.5)
WBC # FLD AUTO: 7.74 K/UL — SIGNIFICANT CHANGE UP (ref 3.8–10.5)

## 2019-10-23 PROCEDURE — 93970 EXTREMITY STUDY: CPT | Mod: 26

## 2019-10-23 PROCEDURE — 99232 SBSQ HOSP IP/OBS MODERATE 35: CPT

## 2019-10-23 RX ORDER — PANTOPRAZOLE SODIUM 20 MG/1
40 TABLET, DELAYED RELEASE ORAL
Refills: 0 | Status: DISCONTINUED | OUTPATIENT
Start: 2019-10-23 | End: 2019-11-08

## 2019-10-23 RX ORDER — INSULIN GLARGINE 100 [IU]/ML
30 INJECTION, SOLUTION SUBCUTANEOUS AT BEDTIME
Refills: 0 | Status: DISCONTINUED | OUTPATIENT
Start: 2019-10-23 | End: 2019-10-25

## 2019-10-23 RX ORDER — SOD SULF/SODIUM/NAHCO3/KCL/PEG
1000 SOLUTION, RECONSTITUTED, ORAL ORAL EVERY 4 HOURS
Refills: 0 | Status: COMPLETED | OUTPATIENT
Start: 2019-10-23 | End: 2019-10-23

## 2019-10-23 RX ORDER — INSULIN LISPRO 100/ML
VIAL (ML) SUBCUTANEOUS EVERY 6 HOURS
Refills: 0 | Status: DISCONTINUED | OUTPATIENT
Start: 2019-10-23 | End: 2019-10-24

## 2019-10-23 RX ORDER — INSULIN GLARGINE 100 [IU]/ML
15 INJECTION, SOLUTION SUBCUTANEOUS AT BEDTIME
Refills: 0 | Status: DISCONTINUED | OUTPATIENT
Start: 2019-10-23 | End: 2019-10-25

## 2019-10-23 RX ORDER — INSULIN LISPRO 100/ML
12 VIAL (ML) SUBCUTANEOUS
Refills: 0 | Status: DISCONTINUED | OUTPATIENT
Start: 2019-10-23 | End: 2019-11-08

## 2019-10-23 RX ADMIN — Medication 1 MILLIGRAM(S): at 11:33

## 2019-10-23 RX ADMIN — OXYCODONE AND ACETAMINOPHEN 2 TABLET(S): 5; 325 TABLET ORAL at 08:24

## 2019-10-23 RX ADMIN — HYDROMORPHONE HYDROCHLORIDE 1 MILLIGRAM(S): 2 INJECTION INTRAMUSCULAR; INTRAVENOUS; SUBCUTANEOUS at 11:45

## 2019-10-23 RX ADMIN — PIPERACILLIN AND TAZOBACTAM 25 GRAM(S): 4; .5 INJECTION, POWDER, LYOPHILIZED, FOR SOLUTION INTRAVENOUS at 21:16

## 2019-10-23 RX ADMIN — OXYCODONE AND ACETAMINOPHEN 2 TABLET(S): 5; 325 TABLET ORAL at 14:57

## 2019-10-23 RX ADMIN — OXYCODONE AND ACETAMINOPHEN 2 TABLET(S): 5; 325 TABLET ORAL at 20:25

## 2019-10-23 RX ADMIN — APIXABAN 5 MILLIGRAM(S): 2.5 TABLET, FILM COATED ORAL at 05:05

## 2019-10-23 RX ADMIN — Medication 2: at 12:31

## 2019-10-23 RX ADMIN — GABAPENTIN 100 MILLIGRAM(S): 400 CAPSULE ORAL at 13:25

## 2019-10-23 RX ADMIN — PIPERACILLIN AND TAZOBACTAM 25 GRAM(S): 4; .5 INJECTION, POWDER, LYOPHILIZED, FOR SOLUTION INTRAVENOUS at 09:00

## 2019-10-23 RX ADMIN — GABAPENTIN 100 MILLIGRAM(S): 400 CAPSULE ORAL at 05:05

## 2019-10-23 RX ADMIN — OXYCODONE AND ACETAMINOPHEN 2 TABLET(S): 5; 325 TABLET ORAL at 08:54

## 2019-10-23 RX ADMIN — Medication 250 MILLIGRAM(S): at 21:16

## 2019-10-23 RX ADMIN — MIRTAZAPINE 30 MILLIGRAM(S): 45 TABLET, ORALLY DISINTEGRATING ORAL at 11:33

## 2019-10-23 RX ADMIN — HYDROMORPHONE HYDROCHLORIDE 1 MILLIGRAM(S): 2 INJECTION INTRAMUSCULAR; INTRAVENOUS; SUBCUTANEOUS at 23:00

## 2019-10-23 RX ADMIN — CHLORHEXIDINE GLUCONATE 1 APPLICATION(S): 213 SOLUTION TOPICAL at 05:06

## 2019-10-23 RX ADMIN — PIPERACILLIN AND TAZOBACTAM 25 GRAM(S): 4; .5 INJECTION, POWDER, LYOPHILIZED, FOR SOLUTION INTRAVENOUS at 01:08

## 2019-10-23 RX ADMIN — DIVALPROEX SODIUM 500 MILLIGRAM(S): 500 TABLET, DELAYED RELEASE ORAL at 11:33

## 2019-10-23 RX ADMIN — TIOTROPIUM BROMIDE 1 CAPSULE(S): 18 CAPSULE ORAL; RESPIRATORY (INHALATION) at 11:33

## 2019-10-23 RX ADMIN — Medication 2: at 08:57

## 2019-10-23 RX ADMIN — LEVETIRACETAM 500 MILLIGRAM(S): 250 TABLET, FILM COATED ORAL at 05:05

## 2019-10-23 RX ADMIN — GABAPENTIN 100 MILLIGRAM(S): 400 CAPSULE ORAL at 22:24

## 2019-10-23 RX ADMIN — HYDROMORPHONE HYDROCHLORIDE 1 MILLIGRAM(S): 2 INJECTION INTRAMUSCULAR; INTRAVENOUS; SUBCUTANEOUS at 01:09

## 2019-10-23 RX ADMIN — LEVETIRACETAM 500 MILLIGRAM(S): 250 TABLET, FILM COATED ORAL at 17:11

## 2019-10-23 RX ADMIN — HYDROMORPHONE HYDROCHLORIDE 1 MILLIGRAM(S): 2 INJECTION INTRAMUSCULAR; INTRAVENOUS; SUBCUTANEOUS at 17:26

## 2019-10-23 RX ADMIN — Medication 10 UNIT(S): at 08:58

## 2019-10-23 RX ADMIN — HYDROMORPHONE HYDROCHLORIDE 1 MILLIGRAM(S): 2 INJECTION INTRAMUSCULAR; INTRAVENOUS; SUBCUTANEOUS at 05:59

## 2019-10-23 RX ADMIN — HYDROMORPHONE HYDROCHLORIDE 1 MILLIGRAM(S): 2 INJECTION INTRAMUSCULAR; INTRAVENOUS; SUBCUTANEOUS at 11:33

## 2019-10-23 RX ADMIN — HYDROMORPHONE HYDROCHLORIDE 1 MILLIGRAM(S): 2 INJECTION INTRAMUSCULAR; INTRAVENOUS; SUBCUTANEOUS at 01:25

## 2019-10-23 RX ADMIN — INSULIN GLARGINE 15 UNIT(S): 100 INJECTION, SOLUTION SUBCUTANEOUS at 23:26

## 2019-10-23 RX ADMIN — Medication 1000 MILLILITER(S): at 16:20

## 2019-10-23 RX ADMIN — PANTOPRAZOLE SODIUM 40 MILLIGRAM(S): 20 TABLET, DELAYED RELEASE ORAL at 17:11

## 2019-10-23 RX ADMIN — OXYCODONE AND ACETAMINOPHEN 2 TABLET(S): 5; 325 TABLET ORAL at 15:30

## 2019-10-23 RX ADMIN — HYDROMORPHONE HYDROCHLORIDE 1 MILLIGRAM(S): 2 INJECTION INTRAMUSCULAR; INTRAVENOUS; SUBCUTANEOUS at 22:24

## 2019-10-23 RX ADMIN — Medication 325 MILLIGRAM(S): at 11:33

## 2019-10-23 RX ADMIN — HYDROMORPHONE HYDROCHLORIDE 1 MILLIGRAM(S): 2 INJECTION INTRAMUSCULAR; INTRAVENOUS; SUBCUTANEOUS at 17:11

## 2019-10-23 RX ADMIN — Medication 12 UNIT(S): at 17:42

## 2019-10-23 RX ADMIN — Medication 20 MILLIGRAM(S): at 22:24

## 2019-10-23 RX ADMIN — AMLODIPINE BESYLATE 10 MILLIGRAM(S): 2.5 TABLET ORAL at 05:05

## 2019-10-23 RX ADMIN — Medication 10 UNIT(S): at 12:31

## 2019-10-23 RX ADMIN — Medication 250 MILLIGRAM(S): at 05:05

## 2019-10-23 RX ADMIN — OXYCODONE AND ACETAMINOPHEN 2 TABLET(S): 5; 325 TABLET ORAL at 21:00

## 2019-10-23 RX ADMIN — Medication 1000 MILLILITER(S): at 20:24

## 2019-10-23 RX ADMIN — HYDROMORPHONE HYDROCHLORIDE 1 MILLIGRAM(S): 2 INJECTION INTRAMUSCULAR; INTRAVENOUS; SUBCUTANEOUS at 05:41

## 2019-10-23 NOTE — CONSULT NOTE ADULT - PROBLEM SELECTOR PROBLEM 2
Deep vein thrombosis (DVT) of upper extremity, unspecified chronicity, unspecified laterality, unspecified vein

## 2019-10-23 NOTE — PROGRESS NOTE ADULT - SUBJECTIVE AND OBJECTIVE BOX
Chief complaint  Patient is a 63y old  Male who presents with a chief complaint of Back pain (23 Oct 2019 11:54)   Review of systems  Patient in bed, looks comfortable, no fever, no hypoglycemia.    Labs and Fingersticks  CAPILLARY BLOOD GLUCOSE      POCT Blood Glucose.: 214 mg/dL (23 Oct 2019 12:00)  POCT Blood Glucose.: 212 mg/dL (23 Oct 2019 08:52)  POCT Blood Glucose.: 227 mg/dL (23 Oct 2019 07:39)  POCT Blood Glucose.: 202 mg/dL (22 Oct 2019 21:54)  POCT Blood Glucose.: 148 mg/dL (22 Oct 2019 17:23)      Anion Gap, Serum: 10 (10-23 @ 05:10)    Hemoglobin A1C, Whole Blood: 10.1 <H> (10-22 @ 09:40)    Calcium, Total Serum: 8.5 (10-23 @ 05:10)          10-23    135  |  102  |  9   ----------------------------<  162<H>  3.5   |  23  |  0.90    Ca    8.5      23 Oct 2019 05:10                          7.4    7.74  )-----------( 286      ( 23 Oct 2019 09:18 )             24.2     Medications  MEDICATIONS  (STANDING):  amLODIPine   Tablet 10 milliGRAM(s) Oral daily  chlorhexidine 4% Liquid 1 Application(s) Topical <User Schedule>  dextrose 5%. 1000 milliLiter(s) (50 mL/Hr) IV Continuous <Continuous>  dextrose 50% Injectable 12.5 Gram(s) IV Push once  dextrose 50% Injectable 25 Gram(s) IV Push once  dextrose 50% Injectable 25 Gram(s) IV Push once  diVALproex  milliGRAM(s) Oral daily  ferrous    sulfate 325 milliGRAM(s) Oral daily  folic acid 1 milliGRAM(s) Oral daily  gabapentin 100 milliGRAM(s) Oral three times a day  insulin glargine Injectable (LANTUS) 26 Unit(s) SubCutaneous at bedtime  insulin lispro (HumaLOG) corrective regimen sliding scale   SubCutaneous three times a day before meals  insulin lispro (HumaLOG) corrective regimen sliding scale   SubCutaneous at bedtime  insulin lispro Injectable (HumaLOG) 10 Unit(s) SubCutaneous three times a day before meals  levETIRAcetam 500 milliGRAM(s) Oral two times a day  mirtazapine 30 milliGRAM(s) Oral daily  piperacillin/tazobactam IVPB.. 3.375 Gram(s) IV Intermittent every 8 hours  tiotropium 18 MICROgram(s) Capsule 1 Capsule(s) Inhalation daily  vancomycin  IVPB 1000 milliGRAM(s) IV Intermittent every 12 hours      Physical Exam  General: Patient comfortable in bed  Vital Signs Last 12 Hrs  T(F): 98.2 (10-23-19 @ 13:23), Max: 98.4 (10-23-19 @ 12:42)  HR: 90 (10-23-19 @ 13:23) (88 - 92)  BP: 145/76 (10-23-19 @ 13:23) (119/71 - 148/76)  BP(mean): --  RR: 18 (10-23-19 @ 13:23) (18 - 18)  SpO2: 97% (10-23-19 @ 13:23) (95% - 97%)  Neck: No palpable thyroid nodules.  CVS: S1S2, No murmurs  Respiratory: No wheezing, no crepitations  GI: Abdomen soft, bowel sounds positive  Musculoskeletal:  edema lower extremities.   Skin: No skin rashes, no ecchymosis    Diagnostics Chief complaint  Patient is a 63y old  Male who presents with a chief complaint of Back pain (23 Oct 2019 11:54)   Review of systems  Patient in bed, looks comfortable, no  fever, no hypoglycemia.    Labs and Fingersticks  CAPILLARY BLOOD GLUCOSE      POCT Blood Glucose.: 214 mg/dL (23 Oct 2019 12:00)  POCT Blood Glucose.: 212 mg/dL (23 Oct 2019 08:52)  POCT Blood Glucose.: 227 mg/dL (23 Oct 2019 07:39)  POCT Blood Glucose.: 202 mg/dL (22 Oct 2019 21:54)  POCT Blood Glucose.: 148 mg/dL (22 Oct 2019 17:23)      Anion Gap, Serum: 10 (10-23 @ 05:10)    Hemoglobin A1C, Whole Blood: 10.1 <H> (10-22 @ 09:40)    Calcium, Total Serum: 8.5 (10-23 @ 05:10)          10-23    135  |  102  |  9   ----------------------------<  162<H>  3.5   |  23  |  0.90    Ca    8.5      23 Oct 2019 05:10                          7.4    7.74  )-----------( 286      ( 23 Oct 2019 09:18 )             24.2     Medications  MEDICATIONS  (STANDING):  amLODIPine   Tablet 10 milliGRAM(s) Oral daily  chlorhexidine 4% Liquid 1 Application(s) Topical <User Schedule>  dextrose 5%. 1000 milliLiter(s) (50 mL/Hr) IV Continuous <Continuous>  dextrose 50% Injectable 12.5 Gram(s) IV Push once  dextrose 50% Injectable 25 Gram(s) IV Push once  dextrose 50% Injectable 25 Gram(s) IV Push once  diVALproex  milliGRAM(s) Oral daily  ferrous    sulfate 325 milliGRAM(s) Oral daily  folic acid 1 milliGRAM(s) Oral daily  gabapentin 100 milliGRAM(s) Oral three times a day  insulin glargine Injectable (LANTUS) 26 Unit(s) SubCutaneous at bedtime  insulin lispro (HumaLOG) corrective regimen sliding scale   SubCutaneous three times a day before meals  insulin lispro (HumaLOG) corrective regimen sliding scale   SubCutaneous at bedtime  insulin lispro Injectable (HumaLOG) 10 Unit(s) SubCutaneous three times a day before meals  levETIRAcetam 500 milliGRAM(s) Oral two times a day  mirtazapine 30 milliGRAM(s) Oral daily  piperacillin/tazobactam IVPB.. 3.375 Gram(s) IV Intermittent every 8 hours  tiotropium 18 MICROgram(s) Capsule 1 Capsule(s) Inhalation daily  vancomycin  IVPB 1000 milliGRAM(s) IV Intermittent every 12 hours      Physical Exam  General: Patient comfortable in bed  Vital Signs Last 12 Hrs  T(F): 98.2 (10-23-19 @ 13:23), Max: 98.4 (10-23-19 @ 12:42)  HR: 90 (10-23-19 @ 13:23) (88 - 92)  BP: 145/76 (10-23-19 @ 13:23) (119/71 - 148/76)  BP(mean): --  RR: 18 (10-23-19 @ 13:23) (18 - 18)  SpO2: 97% (10-23-19 @ 13:23) (95% - 97%)  Neck: No palpable thyroid nodules.  CVS: S1S2, No murmurs  Respiratory: No wheezing, no crepitations  GI: Abdomen soft, bowel sounds positive  Musculoskeletal:  edema lower extremities.   Skin: No skin rashes, no ecchymosis    Diagnostics

## 2019-10-23 NOTE — PROGRESS NOTE ADULT - SUBJECTIVE AND OBJECTIVE BOX
63y old  Male who presents with a chief complaint of Back pain (23 Oct 2019 15:32)      Interval history:  Afebrile, still complains of pain in the back, not gone for MRI yet.       Allergy Status Unknown      Antimicrobials:  piperacillin/tazobactam IVPB.. 3.375 Gram(s) IV Intermittent every 8 hours  vancomycin  IVPB 1000 milliGRAM(s) IV Intermittent every 12 hours      REVIEW OF SYSTEMS:  No chest pain  No cough,   No N/V  No dysuria   No rash.       Vital Signs Last 24 Hrs  T(C): 36.8 (10-23-19 @ 13:23), Max: 36.9 (10-22-19 @ 16:49)  T(F): 98.2 (10-23-19 @ 13:23), Max: 98.4 (10-22-19 @ 16:49)  HR: 90 (10-23-19 @ 13:23) (88 - 92)  BP: 145/76 (10-23-19 @ 13:23) (119/71 - 157/87)  BP(mean): --  RR: 18 (10-23-19 @ 13:23) (18 - 18)  SpO2: 97% (10-23-19 @ 13:23) (95% - 99%)      PHYSICAL EXAM:  Patient in no acute distress. Alert, awake.   No icterus, no oral ulcers.  Cardiovascular: S1S2 normal.  Lungs: + air entry B/L lung fields.  Gastrointestinal: soft, nontender, nondistended.  Extremities: no edema.  + PICC   b/l foot ulcers.                             7.4    7.74  )-----------( 286      ( 23 Oct 2019 09:18 )             24.2   10-23    135  |  102  |  9   ----------------------------<  162<H>  3.5   |  23  |  0.90    Ca    8.5      23 Oct 2019 05:10        Culture - Blood (collected 21 Oct 2019 13:45)  Source: .Blood  Preliminary Report (22 Oct 2019 14:01):    No growth to date.    Culture - Blood (collected 21 Oct 2019 13:45)  Source: .Blood  Preliminary Report (22 Oct 2019 14:01):    No growth to date.      Radiology:  < from: [facility] Duplex Lower Ext Vein Scan, Bilat (10.23.19 @ 11:08) >  IMPRESSION:     No evidence of deep venous thrombosis in either lower extremity.

## 2019-10-23 NOTE — CONSULT NOTE ADULT - SUBJECTIVE AND OBJECTIVE BOX
Chief Complaint:  Patient is a 63y old  Male who presents with a chief complaint of Back pain (23 Oct 2019 13:56)      HPI: 63 y.o. male with multiple chronic medical issues including DM, HTN, and epilepsy was transferred from [facility] for evaluation of a possible ? discitis / osteomyelitis following a mechanical fall landing on his tailbone when he tried to get up from his wheelchair 5 days ago. He is currently residing at a nursing facility for IV antibiotics via left PICC (Vanc / Zosyn) for right foot osteomyelitis.  Was seen at [facility] after the fall and discharged home, but lower back pain persisted and worsened. He was seen at [facility] 10/20 where a CT abd/pelvis found an enlarged psoas muscle with ? abscesses with possible extension to the T12 L1 disc with a compression fracture suspicious for osteomyelitis.      GI consulted for occult positive anemia and Hepatitis C. Pt is a poor historian. He thinks he had an egd/colonoscopy at [facility] but does not know by whom or when they were done. Pt states his stools are "dark." At time of examination pt denies abdominal pain, n/v/d/c, brbpr.        Allergies:  Allergy Status Unknown      Medications:  acetaminophen   Tablet .. 325 milliGRAM(s) Oral daily PRN  ALBUTerol    90 MICROgram(s) HFA Inhaler 2 Puff(s) Inhalation every 6 hours PRN  amLODIPine   Tablet 10 milliGRAM(s) Oral daily  chlorhexidine 4% Liquid 1 Application(s) Topical <User Schedule>  dextrose 40% Gel 15 Gram(s) Oral once PRN  dextrose 5%. 1000 milliLiter(s) IV Continuous <Continuous>  dextrose 50% Injectable 12.5 Gram(s) IV Push once  dextrose 50% Injectable 25 Gram(s) IV Push once  dextrose 50% Injectable 25 Gram(s) IV Push once  diVALproex  milliGRAM(s) Oral daily  ferrous    sulfate 325 milliGRAM(s) Oral daily  folic acid 1 milliGRAM(s) Oral daily  gabapentin 100 milliGRAM(s) Oral three times a day  glucagon  Injectable 1 milliGRAM(s) IntraMuscular once PRN  HYDROmorphone  Injectable 1 milliGRAM(s) IV Push every 4 hours PRN  insulin glargine Injectable (LANTUS) 30 Unit(s) SubCutaneous at bedtime  insulin lispro (HumaLOG) corrective regimen sliding scale   SubCutaneous three times a day before meals  insulin lispro (HumaLOG) corrective regimen sliding scale   SubCutaneous at bedtime  insulin lispro Injectable (HumaLOG) 12 Unit(s) SubCutaneous three times a day before meals  levETIRAcetam 500 milliGRAM(s) Oral two times a day  mirtazapine 30 milliGRAM(s) Oral daily  oxyCODONE    5 mG/acetaminophen 325 mG 2 Tablet(s) Oral every 4 hours PRN  piperacillin/tazobactam IVPB.. 3.375 Gram(s) IV Intermittent every 8 hours  sodium chloride 0.9% lock flush 10 milliLiter(s) IV Push every 1 hour PRN  tiotropium 18 MICROgram(s) Capsule 1 Capsule(s) Inhalation daily  vancomycin  IVPB 1000 milliGRAM(s) IV Intermittent every 12 hours      PMHX/PSHX:  Diabetes  Osteomyelitis      Family history:      Social History:     ROS:     General:  No wt loss, fevers, chills, night sweats, fatigue,   Eyes:  Good vision, no reported pain  ENT:  No sore throat, pain, runny nose, dysphagia  CV:  No pain, palpitations, hypo/hypertension  Resp:  No dyspnea, cough, tachypnea, wheezing  GI:  No pain, No nausea, No vomiting, No diarrhea, No constipation, No weight loss, No fever, No pruritis, No rectal bleeding, + tarry stools, No dysphagia,  :  No pain, bleeding, incontinence, nocturia  Muscle:  No pain, weakness  Neuro:  No weakness, tingling, memory problems  Psych:  No fatigue, insomnia, mood problems, depression  Endocrine:  No polyuria, polydipsia, cold/heat intolerance  Heme:  No petechiae, ecchymosis, easy bruisability  Skin:  No rash, tattoos, scars, edema      PHYSICAL EXAM:   Vital Signs:  Vital Signs Last 24 Hrs  T(C): 36.8 (23 Oct 2019 13:23), Max: 36.9 (22 Oct 2019 16:49)  T(F): 98.2 (23 Oct 2019 13:23), Max: 98.4 (22 Oct 2019 16:49)  HR: 90 (23 Oct 2019 13:23) (88 - 92)  BP: 145/76 (23 Oct 2019 13:23) (119/71 - 157/87)  BP(mean): --  RR: 18 (23 Oct 2019 13:23) (18 - 18)  SpO2: 97% (23 Oct 2019 13:23) (95% - 99%)  Daily     Daily     GENERAL:  Appears stated age, well-groomed, well-nourished, no distress  HEENT:  NC/AT,  conjunctivae clear and pink, no thyromegaly, nodules, adenopathy, no JVD, sclera -anicteric  CHEST:  Full & symmetric excursion, no increased effort, breath sounds clear  HEART:  Regular rhythm, S1, S2, no murmur/rub/S3/S4, no abdominal bruit, no edema  ABDOMEN:  Soft, non-tender, non-distended, normoactive bowel sounds,  no masses ,no hepato-splenomegaly, no signs of chronic liver disease  EXTEREMITIES:  no cyanosis, clubbing or edema  SKIN:  No rash/erythema/ecchymoses/petechiae/wounds/abscess/warm/dry  NEURO:  Alert, oriented, no asterixis, no tremor, no encephalopathy    LABS:                        7.4    7.74  )-----------( 286      ( 23 Oct 2019 09:18 )             24.2     10-23    135  |  102  |  9   ----------------------------<  162<H>  3.5   |  23  |  0.90    Ca    8.5      23 Oct 2019 05:10                Imaging:

## 2019-10-23 NOTE — PROGRESS NOTE ADULT - ASSESSMENT
Assessment  DMT2: 63y Male with DM T2 with hyperglycemia was on oral meds at home, now on high-dose insulin, steroid d/c, blood sugars elevated not at target, no hypoglycemic episode, eating meals,  non compliant with low carb diet.  LE Abscess: on medications, afebrile, stable, monitored.  HTN: Controlled,  on antihypertensive medications.        Jose Alatorre MD  Cell: [phone]  Office: [phone] Assessment  DMT2: 63y Male with DM T2 with hyperglycemia was on oral meds at home, now on basal bolus insulin, blood sugars still elevated and not at target, no hypoglycemic episode, eating meals,  non compliant with low carb diet.  LE Abscess: on medications, afebrile, stable, monitored.  HTN: Controlled,  on antihypertensive medications.        Jose Alatorre MD  Cell: [phone]  Office: [phone] Assessment  DMT2: 63y Male with DM T2 with hyperglycemia was on oral meds at home, now on basal bolus insulin, blood sugars still elevated and not at target,  no hypoglycemic episode, eating meals,  non compliant with low carb diet.  LE Abscess: on medications, afebrile, stable, monitored.  HTN: Controlled,  on antihypertensive medications.        Jose Alatorre MD  Cell: [phone]  Office: [phone]

## 2019-10-23 NOTE — PROGRESS NOTE ADULT - PROBLEM SELECTOR PLAN 1
Will continue current insulin regimen for now. Will continue monitoring FS, log, and FU.  Patient counseled for compliance with consistent low carb diet. Will increase Lantus to 30u at bedtime, increase Humalog to 12u before each meal, and continue coverage scale. Will continue monitoring FS, log, and FU.  Patient counseled for compliance with consistent low carb diet. Will continue monitoring FS, log, and FU.  Patient counseled for compliance with consistent low carb diet.

## 2019-10-23 NOTE — PROGRESS NOTE ADULT - ASSESSMENT
64 yo male with DM, HTN, and epilepsy was transferred from [facility] for possible T12-L1 discitis / osteomyelitis following a mechanical fall 5 days ago. Currently receiving IV Vanc/Zosyn for right foot osteomyelitis. Was seen at [facility] 10/20 where a CT abd/pelvis found an enlarged psoas muscle with ? abscesses with possible extension to the T12 L1 disc with a compression fracture suspicious for osteomyelitis.      Overall foot OM, concern for psoas abscess, spinal OM, elevated ESR. Uncontrolled DM.       Plan:   - MRI with contrast pending   - f/u BCx, NTD.   - c/w Zosyn,   - C/W Vanc 1g q12. Per patient, has completed more than a week therapy.   - check trough prior to next dose, ordered.   - S/p Neurosurgery eval   - s/p podiatry eval.   - Positive Hep C serology, check viral load.

## 2019-10-23 NOTE — CONSULT NOTE ADULT - PROBLEM SELECTOR RECOMMENDATION 9
- suspect upper gastrointestinal source of bleeding given reported melena    - monitor h/h daily, transfuse prn  - IV PPI BID  - will plan for  upper gastrointestinal endoscopy and colonoscopy in am. clear liquid diet today, npo after midnight. Prep ordered. enema in am   - transfuse 1 unit prbc, f/u post transfusion cbc   - hold AC

## 2019-10-23 NOTE — CHART NOTE - NSCHARTNOTEFT_GEN_A_CORE
Medicine NP (s [id number])     ([id number])Per Dr. Jimenes:  -d/c anticoagulation   - transfuse 2 units PRBC today  -monitor CBC  -F/U GI note

## 2019-10-23 NOTE — PROGRESS NOTE ADULT - SUBJECTIVE AND OBJECTIVE BOX
Patient is a 63y old  Male who presents with a chief complaint of Back pain (22 Oct 2019 08:44)      SUBJECTIVE / OVERNIGHT EVENTS:  No new events   Afebrile  Review of Systems:   CONSTITUTIONAL: No fever, weight loss, or fatigue  EYES: No eye pain, visual disturbances, or discharge  ENMT:  No difficulty hearing, tinnitus, vertigo; No sinus or throat pain  NECK: No pain or stiffness  BREASTS: No pain, masses, or nipple discharge  RESPIRATORY: No cough, wheezing, chills or hemoptysis; No shortness of breath  CARDIOVASCULAR: No chest pain, palpitations, dizziness, or leg swelling  GASTROINTESTINAL: No abdominal or epigastric pain. No nausea, vomiting, or hematemesis; No diarrhea or constipation. No melena or hematochezia.  GENITOURINARY: No dysuria, frequency, hematuria, or incontinence  NEUROLOGICAL: No headaches, memory loss, loss of strength, numbness, or tremors  SKIN: No itching, burning, rashes, or lesions   LYMPH NODES: No enlarged glands  ENDOCRINE: No heat or cold intolerance; No hair loss  MUSCULOSKELETAL: No joint pain or swelling;   PSYCHIATRIC: No depression, anxiety, mood swings, or difficulty sleeping  HEME/LYMPH: No easy bruising, or bleeding gums  ALLERY AND IMMUNOLOGIC: No hives or eczema    MEDICATIONS  (STANDING):  amLODIPine   Tablet 10 milliGRAM(s) Oral daily  apixaban 5 milliGRAM(s) Oral every 12 hours  chlorhexidine 4% Liquid 1 Application(s) Topical <User Schedule>  dextrose 5%. 1000 milliLiter(s) (50 mL/Hr) IV Continuous <Continuous>  dextrose 50% Injectable 12.5 Gram(s) IV Push once  dextrose 50% Injectable 25 Gram(s) IV Push once  dextrose 50% Injectable 25 Gram(s) IV Push once  diVALproex  milliGRAM(s) Oral daily  ferrous    sulfate 325 milliGRAM(s) Oral daily  folic acid 1 milliGRAM(s) Oral daily  gabapentin 100 milliGRAM(s) Oral three times a day  insulin glargine Injectable (LANTUS) 26 Unit(s) SubCutaneous at bedtime  insulin lispro (HumaLOG) corrective regimen sliding scale   SubCutaneous three times a day before meals  insulin lispro (HumaLOG) corrective regimen sliding scale   SubCutaneous at bedtime  insulin lispro Injectable (HumaLOG) 10 Unit(s) SubCutaneous three times a day before meals  levETIRAcetam 500 milliGRAM(s) Oral two times a day  mirtazapine 30 milliGRAM(s) Oral daily  piperacillin/tazobactam IVPB.. 3.375 Gram(s) IV Intermittent every 8 hours  tiotropium 18 MICROgram(s) Capsule 1 Capsule(s) Inhalation daily  vancomycin  IVPB 1000 milliGRAM(s) IV Intermittent every 12 hours    MEDICATIONS  (PRN):  acetaminophen   Tablet .. 325 milliGRAM(s) Oral daily PRN Temp greater or equal to 38C (100.4F), Mild Pain (1 - 3)  ALBUTerol    90 MICROgram(s) HFA Inhaler 2 Puff(s) Inhalation every 6 hours PRN Shortness of Breath and/or Wheezing  dextrose 40% Gel 15 Gram(s) Oral once PRN Blood Glucose LESS THAN 70 milliGRAM(s)/deciliter  glucagon  Injectable 1 milliGRAM(s) IntraMuscular once PRN Glucose LESS THAN 70 milligrams/deciliter  HYDROmorphone  Injectable 1 milliGRAM(s) IV Push every 4 hours PRN Severe Pain (7 - 10)  oxyCODONE    5 mG/acetaminophen 325 mG 2 Tablet(s) Oral every 4 hours PRN Moderate Pain (4 - 6)  sodium chloride 0.9% lock flush 10 milliLiter(s) IV Push every 1 hour PRN Pre/post blood products, medications, blood draw, and to maintain line patency      PHYSICAL EXAM:  Vital Signs Last 24 Hrs  T(C): 36.8 (22 Oct 2019 09:12), Max: 37.3 (21 Oct 2019 15:24)  T(F): 98.2 (22 Oct 2019 09:12), Max: 99.1 (21 Oct 2019 15:24)  HR: 89 (22 Oct 2019 09:12) (88 - 99)  BP: 162/87 (22 Oct 2019 09:12) (144/74 - 162/87)  BP(mean): --  RR: 18 (22 Oct 2019 09:12) (18 - 18)  SpO2: 98% (22 Oct 2019 09:12) (96% - 98%)  I&O's Summary    21 Oct 2019 07:01  -  22 Oct 2019 07:00  --------------------------------------------------------  IN: 720 mL / OUT: 650 mL / NET: 70 mL    22 Oct 2019 07:01  -  22 Oct 2019 12:52  --------------------------------------------------------  IN: 600 mL / OUT: 450 mL / NET: 150 mL      GENERAL: NAD, well-developed  HEAD:  Atraumatic, Normocephalic  EYES: EOMI, PERRLA, conjunctiva and sclera clear  NECK: Supple, No JVD  CHEST/LUNG: Clear to auscultation bilaterally; No wheeze  HEART: Regular rate and rhythm; No murmurs, rubs, or gallops  ABDOMEN: Soft, Nontender, Nondistended; Bowel sounds present  EXTREMITIES:  elda TMA  PSYCH: AAOx3  NEUROLOGY: non-focal  SKIN: No rashes or lesions    LABS:  CAPILLARY BLOOD GLUCOSE      POCT Blood Glucose.: 157 mg/dL (22 Oct 2019 11:59)  POCT Blood Glucose.: 280 mg/dL (22 Oct 2019 08:57)  POCT Blood Glucose.: 299 mg/dL (21 Oct 2019 22:05)  POCT Blood Glucose.: 260 mg/dL (21 Oct 2019 17:11)                          8.3    9.26  )-----------( 401      ( 21 Oct 2019 09:40 )             26.8     10-21    135  |  100  |  11  ----------------------------<  184<H>  3.9   |  25  |  1.16    Ca    9.2      21 Oct 2019 09:40    TPro  8.9<H>  /  Alb  3.2<L>  /  TBili  0.3  /  DBili  x   /  AST  13  /  ALT  7<L>  /  AlkPhos  66  10-21    PT/INR - ( 21 Oct 2019 09:40 )   PT: 12.6 sec;   INR: 1.09 ratio         PTT - ( 21 Oct 2019 09:40 )  PTT:30.3 sec          RADIOLOGY & ADDITIONAL TESTS:    Imaging Personally Reviewed:    Consultant(s) Notes Reviewed:      Care Discussed with Consultants/Other Providers:

## 2019-10-23 NOTE — CONSULT NOTE ADULT - ASSESSMENT
62 yo male with DM, HTN, and epilepsy was transferred from [facility] for possible T12-L1 discitis / osteomyelitis following a mechanical fall 5 days ago. GI consulted for occult positive anemia.

## 2019-10-24 LAB
ANION GAP SERPL CALC-SCNC: 11 MMOL/L — SIGNIFICANT CHANGE UP (ref 5–17)
BLD GP AB SCN SERPL QL: NEGATIVE — SIGNIFICANT CHANGE UP
BUN SERPL-MCNC: 8 MG/DL — SIGNIFICANT CHANGE UP (ref 7–23)
CALCIUM SERPL-MCNC: 9.3 MG/DL — SIGNIFICANT CHANGE UP (ref 8.4–10.5)
CHLORIDE SERPL-SCNC: 99 MMOL/L — SIGNIFICANT CHANGE UP (ref 96–108)
CO2 SERPL-SCNC: 25 MMOL/L — SIGNIFICANT CHANGE UP (ref 22–31)
CREAT SERPL-MCNC: 0.95 MG/DL — SIGNIFICANT CHANGE UP (ref 0.5–1.3)
GLUCOSE BLDC GLUCOMTR-MCNC: 117 MG/DL — HIGH (ref 70–99)
GLUCOSE BLDC GLUCOMTR-MCNC: 168 MG/DL — HIGH (ref 70–99)
GLUCOSE BLDC GLUCOMTR-MCNC: 171 MG/DL — HIGH (ref 70–99)
GLUCOSE BLDC GLUCOMTR-MCNC: 180 MG/DL — HIGH (ref 70–99)
GLUCOSE BLDC GLUCOMTR-MCNC: 233 MG/DL — HIGH (ref 70–99)
GLUCOSE SERPL-MCNC: 157 MG/DL — HIGH (ref 70–99)
HCT VFR BLD CALC: 29.3 % — LOW (ref 39–50)
HCV RNA FLD QL NAA+PROBE: SIGNIFICANT CHANGE UP
HCV RNA SPEC QL PROBE+SIG AMP: SIGNIFICANT CHANGE UP
HGB BLD-MCNC: 9.6 G/DL — LOW (ref 13–17)
MCHC RBC-ENTMCNC: 28.1 PG — SIGNIFICANT CHANGE UP (ref 27–34)
MCHC RBC-ENTMCNC: 32.8 GM/DL — SIGNIFICANT CHANGE UP (ref 32–36)
MCV RBC AUTO: 85.7 FL — SIGNIFICANT CHANGE UP (ref 80–100)
NRBC # BLD: 0 /100 WBCS — SIGNIFICANT CHANGE UP (ref 0–0)
PLATELET # BLD AUTO: 303 K/UL — SIGNIFICANT CHANGE UP (ref 150–400)
POTASSIUM SERPL-MCNC: 3.5 MMOL/L — SIGNIFICANT CHANGE UP (ref 3.5–5.3)
POTASSIUM SERPL-SCNC: 3.5 MMOL/L — SIGNIFICANT CHANGE UP (ref 3.5–5.3)
RBC # BLD: 3.42 M/UL — LOW (ref 4.2–5.8)
RBC # FLD: 14.7 % — HIGH (ref 10.3–14.5)
RH IG SCN BLD-IMP: POSITIVE — SIGNIFICANT CHANGE UP
SODIUM SERPL-SCNC: 135 MMOL/L — SIGNIFICANT CHANGE UP (ref 135–145)
WBC # BLD: 9.83 K/UL — SIGNIFICANT CHANGE UP (ref 3.8–10.5)
WBC # FLD AUTO: 9.83 K/UL — SIGNIFICANT CHANGE UP (ref 3.8–10.5)

## 2019-10-24 PROCEDURE — 76700 US EXAM ABDOM COMPLETE: CPT | Mod: 26

## 2019-10-24 PROCEDURE — 88305 TISSUE EXAM BY PATHOLOGIST: CPT | Mod: 26

## 2019-10-24 PROCEDURE — 88312 SPECIAL STAINS GROUP 1: CPT | Mod: 26

## 2019-10-24 PROCEDURE — 93970 EXTREMITY STUDY: CPT | Mod: 26

## 2019-10-24 PROCEDURE — 99232 SBSQ HOSP IP/OBS MODERATE 35: CPT

## 2019-10-24 RX ORDER — INSULIN LISPRO 100/ML
VIAL (ML) SUBCUTANEOUS AT BEDTIME
Refills: 0 | Status: DISCONTINUED | OUTPATIENT
Start: 2019-10-24 | End: 2019-11-08

## 2019-10-24 RX ORDER — INSULIN LISPRO 100/ML
VIAL (ML) SUBCUTANEOUS
Refills: 0 | Status: DISCONTINUED | OUTPATIENT
Start: 2019-10-24 | End: 2019-11-08

## 2019-10-24 RX ADMIN — DIVALPROEX SODIUM 500 MILLIGRAM(S): 500 TABLET, DELAYED RELEASE ORAL at 12:19

## 2019-10-24 RX ADMIN — HYDROMORPHONE HYDROCHLORIDE 1 MILLIGRAM(S): 2 INJECTION INTRAMUSCULAR; INTRAVENOUS; SUBCUTANEOUS at 21:11

## 2019-10-24 RX ADMIN — Medication 325 MILLIGRAM(S): at 12:19

## 2019-10-24 RX ADMIN — PANTOPRAZOLE SODIUM 40 MILLIGRAM(S): 20 TABLET, DELAYED RELEASE ORAL at 17:07

## 2019-10-24 RX ADMIN — Medication 325 MILLIGRAM(S): at 20:43

## 2019-10-24 RX ADMIN — PIPERACILLIN AND TAZOBACTAM 25 GRAM(S): 4; .5 INJECTION, POWDER, LYOPHILIZED, FOR SOLUTION INTRAVENOUS at 01:00

## 2019-10-24 RX ADMIN — MIRTAZAPINE 30 MILLIGRAM(S): 45 TABLET, ORALLY DISINTEGRATING ORAL at 12:20

## 2019-10-24 RX ADMIN — LEVETIRACETAM 500 MILLIGRAM(S): 250 TABLET, FILM COATED ORAL at 06:34

## 2019-10-24 RX ADMIN — HYDROMORPHONE HYDROCHLORIDE 1 MILLIGRAM(S): 2 INJECTION INTRAMUSCULAR; INTRAVENOUS; SUBCUTANEOUS at 12:18

## 2019-10-24 RX ADMIN — CHLORHEXIDINE GLUCONATE 1 APPLICATION(S): 213 SOLUTION TOPICAL at 06:35

## 2019-10-24 RX ADMIN — AMLODIPINE BESYLATE 10 MILLIGRAM(S): 2.5 TABLET ORAL at 06:34

## 2019-10-24 RX ADMIN — PIPERACILLIN AND TAZOBACTAM 25 GRAM(S): 4; .5 INJECTION, POWDER, LYOPHILIZED, FOR SOLUTION INTRAVENOUS at 12:20

## 2019-10-24 RX ADMIN — HYDROMORPHONE HYDROCHLORIDE 1 MILLIGRAM(S): 2 INJECTION INTRAMUSCULAR; INTRAVENOUS; SUBCUTANEOUS at 17:40

## 2019-10-24 RX ADMIN — HYDROMORPHONE HYDROCHLORIDE 1 MILLIGRAM(S): 2 INJECTION INTRAMUSCULAR; INTRAVENOUS; SUBCUTANEOUS at 08:14

## 2019-10-24 RX ADMIN — Medication 250 MILLIGRAM(S): at 21:12

## 2019-10-24 RX ADMIN — Medication 1: at 06:32

## 2019-10-24 RX ADMIN — HYDROMORPHONE HYDROCHLORIDE 1 MILLIGRAM(S): 2 INJECTION INTRAMUSCULAR; INTRAVENOUS; SUBCUTANEOUS at 17:10

## 2019-10-24 RX ADMIN — GABAPENTIN 100 MILLIGRAM(S): 400 CAPSULE ORAL at 17:07

## 2019-10-24 RX ADMIN — PANTOPRAZOLE SODIUM 40 MILLIGRAM(S): 20 TABLET, DELAYED RELEASE ORAL at 06:47

## 2019-10-24 RX ADMIN — GABAPENTIN 100 MILLIGRAM(S): 400 CAPSULE ORAL at 06:34

## 2019-10-24 RX ADMIN — INSULIN GLARGINE 30 UNIT(S): 100 INJECTION, SOLUTION SUBCUTANEOUS at 22:20

## 2019-10-24 RX ADMIN — Medication 1: at 12:55

## 2019-10-24 RX ADMIN — Medication 250 MILLIGRAM(S): at 08:20

## 2019-10-24 RX ADMIN — Medication 1 ENEMA: at 08:20

## 2019-10-24 RX ADMIN — Medication 1 MILLIGRAM(S): at 12:19

## 2019-10-24 RX ADMIN — Medication 2: at 17:15

## 2019-10-24 RX ADMIN — HYDROMORPHONE HYDROCHLORIDE 1 MILLIGRAM(S): 2 INJECTION INTRAMUSCULAR; INTRAVENOUS; SUBCUTANEOUS at 03:00

## 2019-10-24 RX ADMIN — Medication 12 UNIT(S): at 17:12

## 2019-10-24 RX ADMIN — PIPERACILLIN AND TAZOBACTAM 25 GRAM(S): 4; .5 INJECTION, POWDER, LYOPHILIZED, FOR SOLUTION INTRAVENOUS at 17:07

## 2019-10-24 RX ADMIN — LEVETIRACETAM 500 MILLIGRAM(S): 250 TABLET, FILM COATED ORAL at 17:07

## 2019-10-24 RX ADMIN — HYDROMORPHONE HYDROCHLORIDE 1 MILLIGRAM(S): 2 INJECTION INTRAMUSCULAR; INTRAVENOUS; SUBCUTANEOUS at 02:43

## 2019-10-24 RX ADMIN — GABAPENTIN 100 MILLIGRAM(S): 400 CAPSULE ORAL at 21:14

## 2019-10-24 RX ADMIN — Medication 325 MILLIGRAM(S): at 20:04

## 2019-10-24 RX ADMIN — HYDROMORPHONE HYDROCHLORIDE 1 MILLIGRAM(S): 2 INJECTION INTRAMUSCULAR; INTRAVENOUS; SUBCUTANEOUS at 07:44

## 2019-10-24 RX ADMIN — HYDROMORPHONE HYDROCHLORIDE 1 MILLIGRAM(S): 2 INJECTION INTRAMUSCULAR; INTRAVENOUS; SUBCUTANEOUS at 21:47

## 2019-10-24 RX ADMIN — TIOTROPIUM BROMIDE 1 CAPSULE(S): 18 CAPSULE ORAL; RESPIRATORY (INHALATION) at 12:18

## 2019-10-24 NOTE — PROGRESS NOTE ADULT - SUBJECTIVE AND OBJECTIVE BOX
63y old  Male who presents with a chief complaint of Back pain (24 Oct 2019 13:07)      Interval history:  Afebrile, hungry, still with back pain, had BM all night long due to bowel prep.       Allergy Status Unknown      Antimicrobials:  piperacillin/tazobactam IVPB.. 3.375 Gram(s) IV Intermittent every 8 hours  vancomycin  IVPB 1000 milliGRAM(s) IV Intermittent every 12 hours      REVIEW OF SYSTEMS:  No chest pain   No cough, no SOB  No N/V  No dysuria  No rash.       Vital Signs Last 24 Hrs  T(C): 36.5 (10-24-19 @ 13:14), Max: 37 (10-23-19 @ 20:08)  T(F): 97.7 (10-24-19 @ 13:14), Max: 98.6 (10-23-19 @ 20:08)  HR: 81 (10-24-19 @ 13:14) (81 - 93)  BP: 144/80 (10-24-19 @ 13:14) (137/72 - 151/84)  BP(mean): --  RR: 18 (10-24-19 @ 13:14) (18 - 18)  SpO2: 97% (10-24-19 @ 13:14) (97% - 98%)      PHYSICAL EXAM:  Patient in no acute distress. Alert, awake.   No icterus, no oral ulcers.  Cardiovascular: S1S2 normal.  Lungs: + air entry B/L lung fields.  Gastrointestinal: soft, nontender, nondistended.  Extremities: no edema.  + PICC   b/l foot ulcers.                             9.6    9.83  )-----------( 303      ( 24 Oct 2019 06:31 )             29.3   10-24    135  |  99  |  8   ----------------------------<  157<H>  3.5   |  25  |  0.95    Ca    9.3      24 Oct 2019 06:31        Radiology:  < from: US Abdomen Complete (10.24.19 @ 09:35) >  IMPRESSION:     Normal liver.    Dilated common bile duct.    Avascular hypoechoic collection in the region of the right iliopsoas   muscle representing a hematoma corresponding to the findings on   10/21/2019 MRI lumbar spine.

## 2019-10-24 NOTE — PROGRESS NOTE ADULT - ASSESSMENT
Assessment  DMT2: 63y Male with DM T2 with hyperglycemia was on oral meds at home, now on basal bolus insulin, increased dose yesterday, blood sugars improving, no hypoglycemic episode, NPO for egd/colonoscopy today.  LE Abscess: on medications, afebrile, stable, monitored.  HTN: Controlled,  on antihypertensive medications.  GIB: egd/colonoscopy today, ZEHRA Alatorre MD  Cell: [phone]  Office: [phone] Assessment  DMT2: 63y Male with DM T2 with hyperglycemia was on oral meds at home, now on basal bolus insulin, increased dose yesterday,  blood sugars improving, no hypoglycemic episode, NPO for egd/colonoscopy today.  LE Abscess: on medications, afebrile, stable, monitored.  HTN: Controlled,  on antihypertensive medications.  GIB: egd/colonoscopy today, ZEHRA Alatorre MD  Cell: [phone]  Office: [phone]

## 2019-10-24 NOTE — PROGRESS NOTE ADULT - ASSESSMENT
62 yo male with DM, HTN, and epilepsy was transferred from [facility] for possible T12-L1 discitis / osteomyelitis following a mechanical fall 5 days ago. Currently receiving IV Vanc/Zosyn for right foot osteomyelitis. Was seen at [facility] 10/20 where a CT abd/pelvis found an enlarged psoas muscle with ? abscesses with possible extension to the T12 L1 disc with a compression fracture suspicious for osteomyelitis.      Overall foot OM, concern for psoas abscess, spinal OM, elevated ESR. Uncontrolled DM.       Plan:   - MRI with contrast still pending   - f/u BCx, NTD.   - c/w Zosyn,   - C/W Vanc 1g q12. Per patient, has completed more than a week therapy.   - check trough prior to next dose, ordered.   - S/p Neurosurgery eval   - s/p podiatry eval.   - Positive Hep C serology, viral load in lab.   - U/S with iliopsoas hematoma not abscess.

## 2019-10-24 NOTE — PROGRESS NOTE ADULT - SUBJECTIVE AND OBJECTIVE BOX
Pre-Endoscopy Evaluation      Referring Physician:  dr. [email or web address]                                 Procedure:  upper gastrointestinal endoscopy /colonoscopy    Indication for Procedure: anemia, FOBT positive    Pertinent History: 63y old male with DM, HTN, and epilepsy was transferred from [facility] for possible T12-L1 discitis / osteomyelitis following a mechanical fall with FOBT anemia      Sedation by Anesthesia [x]    PAST MEDICAL & SURGICAL HISTORY:  Diabetes  Osteomyelitis      PMH of Gastroparesis [ ]  Gastric Surgery [ ]  Gastric Outlet Obstruction [ ]    Allergies    Allergy Status Unknown    Intolerances:    Latex allergy: [ ] yes [X] no    Medications:MEDICATIONS  (STANDING):  amLODIPine   Tablet 10 milliGRAM(s) Oral daily  chlorhexidine 4% Liquid 1 Application(s) Topical <User Schedule>  dextrose 5%. 1000 milliLiter(s) (50 mL/Hr) IV Continuous <Continuous>  dextrose 50% Injectable 12.5 Gram(s) IV Push once  dextrose 50% Injectable 25 Gram(s) IV Push once  dextrose 50% Injectable 25 Gram(s) IV Push once  diVALproex  milliGRAM(s) Oral daily  ferrous    sulfate 325 milliGRAM(s) Oral daily  folic acid 1 milliGRAM(s) Oral daily  gabapentin 100 milliGRAM(s) Oral three times a day  insulin glargine Injectable (LANTUS) 30 Unit(s) SubCutaneous at bedtime  insulin glargine Injectable (LANTUS) 15 Unit(s) SubCutaneous at bedtime  insulin lispro (HumaLOG) corrective regimen sliding scale   SubCutaneous every 6 hours  insulin lispro Injectable (HumaLOG) 12 Unit(s) SubCutaneous three times a day before meals  levETIRAcetam 500 milliGRAM(s) Oral two times a day  mirtazapine 30 milliGRAM(s) Oral daily  pantoprazole  Injectable 40 milliGRAM(s) IV Push two times a day  piperacillin/tazobactam IVPB.. 3.375 Gram(s) IV Intermittent every 8 hours  tiotropium 18 MICROgram(s) Capsule 1 Capsule(s) Inhalation daily  vancomycin  IVPB 1000 milliGRAM(s) IV Intermittent every 12 hours    MEDICATIONS  (PRN):  acetaminophen   Tablet .. 325 milliGRAM(s) Oral daily PRN Temp greater or equal to 38C (100.4F), Mild Pain (1 - 3)  ALBUTerol    90 MICROgram(s) HFA Inhaler 2 Puff(s) Inhalation every 6 hours PRN Shortness of Breath and/or Wheezing  dextrose 40% Gel 15 Gram(s) Oral once PRN Blood Glucose LESS THAN 70 milliGRAM(s)/deciliter  glucagon  Injectable 1 milliGRAM(s) IntraMuscular once PRN Glucose LESS THAN 70 milligrams/deciliter  HYDROmorphone  Injectable 1 milliGRAM(s) IV Push every 4 hours PRN Severe Pain (7 - 10)  oxyCODONE    5 mG/acetaminophen 325 mG 2 Tablet(s) Oral every 4 hours PRN Moderate Pain (4 - 6)  sodium chloride 0.9% lock flush 10 milliLiter(s) IV Push every 1 hour PRN Pre/post blood products, medications, blood draw, and to maintain line patency      Smoking: [ ] yes  [X] no    AICD/PPM: [ ] yes   [X] no    Pertinent lab data:                        9.6    9.83  )-----------( 303      ( 24 Oct 2019 06:31 )             29.3     10-24    135  |  99  |  8   ----------------------------<  157<H>  3.5   |  25  |  0.95    Ca    9.3      24 Oct 2019 06:31    CAPILLARY BLOOD GLUCOSE  POCT Blood Glucose.: 168 mg/dL (24 Oct 2019 06:10)      Physical Examination:   Daily   Vital Signs Last 24 Hrs  T(C): 36.7 (24 Oct 2019 08:49), Max: 37 (23 Oct 2019 20:08)  T(F): 98.1 (24 Oct 2019 08:49), Max: 98.6 (23 Oct 2019 20:08)  HR: 87 (24 Oct 2019 08:49) (84 - 93)  BP: 150/72 (24 Oct 2019 08:49) (119/71 - 151/84)  BP(mean): --  RR: 18 (24 Oct 2019 08:49) (18 - 18)  SpO2: 98% (24 Oct 2019 08:49) (95% - 98%)    Drug Dosing Weight  Height (cm): 182.88 (21 Oct 2019 08:56)  Weight (kg): 86.2 (21 Oct 2019 08:56)  BMI (kg/m2): 25.8 (21 Oct 2019 08:56)  BSA (m2): 2.08 (21 Oct 2019 08:56)    Constitutional: NAD     Neck:  No JVD    Respiratory: CTAB/L    Cardiovascular: S1 and S2    Gastrointestinal: BS+, soft, NT/ND    Extremities: No peripheral edema    Neurological: A/O x 3,    : No Albarran    Skin: No rashes    Comments:      The patient is a suitable candidate for the planned procedure unless box checked [ ]  No, explain:

## 2019-10-24 NOTE — PROGRESS NOTE ADULT - SUBJECTIVE AND OBJECTIVE BOX
Chief complaint  Patient is a 63y old  Male who presents with a chief complaint of Back pain (24 Oct 2019 09:24)   Review of systems  Patient in bed, looks comfortable, no fever, no hypoglycemia.    Labs and Fingersticks  CAPILLARY BLOOD GLUCOSE      POCT Blood Glucose.: 180 mg/dL (24 Oct 2019 12:28)  POCT Blood Glucose.: 168 mg/dL (24 Oct 2019 06:10)  POCT Blood Glucose.: 117 mg/dL (24 Oct 2019 00:42)  POCT Blood Glucose.: 98 mg/dL (23 Oct 2019 23:24)  POCT Blood Glucose.: 108 mg/dL (23 Oct 2019 22:07)  POCT Blood Glucose.: 150 mg/dL (23 Oct 2019 17:25)      Anion Gap, Serum: 11 (10-24 @ 06:31)  Anion Gap, Serum: 10 (10-23 @ 05:10)      Calcium, Total Serum: 9.3 (10-24 @ 06:31)  Calcium, Total Serum: 8.5 (10-23 @ 05:10)          10-24    135  |  99  |  8   ----------------------------<  157<H>  3.5   |  25  |  0.95    Ca    9.3      24 Oct 2019 06:31                          9.6    9.83  )-----------( 303      ( 24 Oct 2019 06:31 )             29.3     Medications  MEDICATIONS  (STANDING):  amLODIPine   Tablet 10 milliGRAM(s) Oral daily  chlorhexidine 4% Liquid 1 Application(s) Topical <User Schedule>  dextrose 5%. 1000 milliLiter(s) (50 mL/Hr) IV Continuous <Continuous>  dextrose 50% Injectable 12.5 Gram(s) IV Push once  dextrose 50% Injectable 25 Gram(s) IV Push once  dextrose 50% Injectable 25 Gram(s) IV Push once  diVALproex  milliGRAM(s) Oral daily  ferrous    sulfate 325 milliGRAM(s) Oral daily  folic acid 1 milliGRAM(s) Oral daily  gabapentin 100 milliGRAM(s) Oral three times a day  insulin glargine Injectable (LANTUS) 30 Unit(s) SubCutaneous at bedtime  insulin glargine Injectable (LANTUS) 15 Unit(s) SubCutaneous at bedtime  insulin lispro (HumaLOG) corrective regimen sliding scale   SubCutaneous every 6 hours  insulin lispro Injectable (HumaLOG) 12 Unit(s) SubCutaneous three times a day before meals  levETIRAcetam 500 milliGRAM(s) Oral two times a day  mirtazapine 30 milliGRAM(s) Oral daily  pantoprazole  Injectable 40 milliGRAM(s) IV Push two times a day  piperacillin/tazobactam IVPB.. 3.375 Gram(s) IV Intermittent every 8 hours  tiotropium 18 MICROgram(s) Capsule 1 Capsule(s) Inhalation daily  vancomycin  IVPB 1000 milliGRAM(s) IV Intermittent every 12 hours      Physical Exam  General: Patient comfortable in bed  Vital Signs Last 12 Hrs  T(F): 98.1 (10-24-19 @ 08:49), Max: 98.3 (10-24-19 @ 04:17)  HR: 87 (10-24-19 @ 08:49) (84 - 87)  BP: 150/72 (10-24-19 @ 08:49) (137/72 - 150/72)  BP(mean): --  RR: 18 (10-24-19 @ 08:49) (18 - 18)  SpO2: 98% (10-24-19 @ 08:49) (98% - 98%)  Neck: No palpable thyroid nodules.  CVS: S1S2, No murmurs  Respiratory: No wheezing, no crepitations  GI: Abdomen soft, bowel sounds positive  Musculoskeletal:  edema lower extremities.   Skin: No skin rashes, no ecchymosis    Diagnostics Chief complaint  Patient is a 63y old  Male who presents with a chief complaint of Back pain (24 Oct 2019 09:24)   Review of systems  Patient in bed, looks comfortable, no fever,  no hypoglycemia.    Labs and Fingersticks  CAPILLARY BLOOD GLUCOSE      POCT Blood Glucose.: 180 mg/dL (24 Oct 2019 12:28)  POCT Blood Glucose.: 168 mg/dL (24 Oct 2019 06:10)  POCT Blood Glucose.: 117 mg/dL (24 Oct 2019 00:42)  POCT Blood Glucose.: 98 mg/dL (23 Oct 2019 23:24)  POCT Blood Glucose.: 108 mg/dL (23 Oct 2019 22:07)  POCT Blood Glucose.: 150 mg/dL (23 Oct 2019 17:25)      Anion Gap, Serum: 11 (10-24 @ 06:31)  Anion Gap, Serum: 10 (10-23 @ 05:10)      Calcium, Total Serum: 9.3 (10-24 @ 06:31)  Calcium, Total Serum: 8.5 (10-23 @ 05:10)          10-24    135  |  99  |  8   ----------------------------<  157<H>  3.5   |  25  |  0.95    Ca    9.3      24 Oct 2019 06:31                          9.6    9.83  )-----------( 303      ( 24 Oct 2019 06:31 )             29.3     Medications  MEDICATIONS  (STANDING):  amLODIPine   Tablet 10 milliGRAM(s) Oral daily  chlorhexidine 4% Liquid 1 Application(s) Topical <User Schedule>  dextrose 5%. 1000 milliLiter(s) (50 mL/Hr) IV Continuous <Continuous>  dextrose 50% Injectable 12.5 Gram(s) IV Push once  dextrose 50% Injectable 25 Gram(s) IV Push once  dextrose 50% Injectable 25 Gram(s) IV Push once  diVALproex  milliGRAM(s) Oral daily  ferrous    sulfate 325 milliGRAM(s) Oral daily  folic acid 1 milliGRAM(s) Oral daily  gabapentin 100 milliGRAM(s) Oral three times a day  insulin glargine Injectable (LANTUS) 30 Unit(s) SubCutaneous at bedtime  insulin glargine Injectable (LANTUS) 15 Unit(s) SubCutaneous at bedtime  insulin lispro (HumaLOG) corrective regimen sliding scale   SubCutaneous every 6 hours  insulin lispro Injectable (HumaLOG) 12 Unit(s) SubCutaneous three times a day before meals  levETIRAcetam 500 milliGRAM(s) Oral two times a day  mirtazapine 30 milliGRAM(s) Oral daily  pantoprazole  Injectable 40 milliGRAM(s) IV Push two times a day  piperacillin/tazobactam IVPB.. 3.375 Gram(s) IV Intermittent every 8 hours  tiotropium 18 MICROgram(s) Capsule 1 Capsule(s) Inhalation daily  vancomycin  IVPB 1000 milliGRAM(s) IV Intermittent every 12 hours      Physical Exam  General: Patient comfortable in bed  Vital Signs Last 12 Hrs  T(F): 98.1 (10-24-19 @ 08:49), Max: 98.3 (10-24-19 @ 04:17)  HR: 87 (10-24-19 @ 08:49) (84 - 87)  BP: 150/72 (10-24-19 @ 08:49) (137/72 - 150/72)  BP(mean): --  RR: 18 (10-24-19 @ 08:49) (18 - 18)  SpO2: 98% (10-24-19 @ 08:49) (98% - 98%)  Neck: No palpable thyroid nodules.  CVS: S1S2, No murmurs  Respiratory: No wheezing, no crepitations  GI: Abdomen soft, bowel sounds positive  Musculoskeletal:  edema lower extremities.   Skin: No skin rashes, no ecchymosis    Diagnostics

## 2019-10-25 DIAGNOSIS — D64.9 ANEMIA, UNSPECIFIED: ICD-10-CM

## 2019-10-25 LAB
ANION GAP SERPL CALC-SCNC: 11 MMOL/L — SIGNIFICANT CHANGE UP (ref 5–17)
BUN SERPL-MCNC: 9 MG/DL — SIGNIFICANT CHANGE UP (ref 7–23)
CALCIUM SERPL-MCNC: 9.2 MG/DL — SIGNIFICANT CHANGE UP (ref 8.4–10.5)
CHLORIDE SERPL-SCNC: 101 MMOL/L — SIGNIFICANT CHANGE UP (ref 96–108)
CO2 SERPL-SCNC: 25 MMOL/L — SIGNIFICANT CHANGE UP (ref 22–31)
CREAT SERPL-MCNC: 1.09 MG/DL — SIGNIFICANT CHANGE UP (ref 0.5–1.3)
GLUCOSE BLDC GLUCOMTR-MCNC: 113 MG/DL — HIGH (ref 70–99)
GLUCOSE BLDC GLUCOMTR-MCNC: 131 MG/DL — HIGH (ref 70–99)
GLUCOSE BLDC GLUCOMTR-MCNC: 159 MG/DL — HIGH (ref 70–99)
GLUCOSE BLDC GLUCOMTR-MCNC: 220 MG/DL — HIGH (ref 70–99)
GLUCOSE SERPL-MCNC: 155 MG/DL — HIGH (ref 70–99)
HCT VFR BLD CALC: 28.6 % — LOW (ref 39–50)
HCV RNA SERPL NAA DL=5-ACNC: SIGNIFICANT CHANGE UP IU/ML
HCV RNA SPEC NAA+PROBE-LOG IU: <1.08 LOGIU/ML — HIGH
HGB BLD-MCNC: 9.1 G/DL — LOW (ref 13–17)
MCHC RBC-ENTMCNC: 28.3 PG — SIGNIFICANT CHANGE UP (ref 27–34)
MCHC RBC-ENTMCNC: 31.8 GM/DL — LOW (ref 32–36)
MCV RBC AUTO: 88.8 FL — SIGNIFICANT CHANGE UP (ref 80–100)
PLATELET # BLD AUTO: 252 K/UL — SIGNIFICANT CHANGE UP (ref 150–400)
POTASSIUM SERPL-MCNC: 3.8 MMOL/L — SIGNIFICANT CHANGE UP (ref 3.5–5.3)
POTASSIUM SERPL-SCNC: 3.8 MMOL/L — SIGNIFICANT CHANGE UP (ref 3.5–5.3)
RBC # BLD: 3.22 M/UL — LOW (ref 4.2–5.8)
RBC # FLD: 14.8 % — HIGH (ref 10.3–14.5)
SODIUM SERPL-SCNC: 137 MMOL/L — SIGNIFICANT CHANGE UP (ref 135–145)
VANCOMYCIN TROUGH SERPL-MCNC: 10.7 UG/ML — SIGNIFICANT CHANGE UP (ref 10–20)
WBC # BLD: 9.14 K/UL — SIGNIFICANT CHANGE UP (ref 3.8–10.5)
WBC # FLD AUTO: 9.14 K/UL — SIGNIFICANT CHANGE UP (ref 3.8–10.5)

## 2019-10-25 PROCEDURE — 99232 SBSQ HOSP IP/OBS MODERATE 35: CPT

## 2019-10-25 PROCEDURE — 74181 MRI ABDOMEN W/O CONTRAST: CPT | Mod: 26

## 2019-10-25 RX ORDER — INSULIN GLARGINE 100 [IU]/ML
30 INJECTION, SOLUTION SUBCUTANEOUS AT BEDTIME
Refills: 0 | Status: DISCONTINUED | OUTPATIENT
Start: 2019-10-25 | End: 2019-11-08

## 2019-10-25 RX ORDER — HEPARIN SODIUM 5000 [USP'U]/ML
5000 INJECTION INTRAVENOUS; SUBCUTANEOUS
Refills: 0 | Status: DISCONTINUED | OUTPATIENT
Start: 2019-10-25 | End: 2019-11-08

## 2019-10-25 RX ADMIN — OXYCODONE AND ACETAMINOPHEN 2 TABLET(S): 5; 325 TABLET ORAL at 00:18

## 2019-10-25 RX ADMIN — PANTOPRAZOLE SODIUM 40 MILLIGRAM(S): 20 TABLET, DELAYED RELEASE ORAL at 18:24

## 2019-10-25 RX ADMIN — CHLORHEXIDINE GLUCONATE 1 APPLICATION(S): 213 SOLUTION TOPICAL at 05:45

## 2019-10-25 RX ADMIN — HYDROMORPHONE HYDROCHLORIDE 1 MILLIGRAM(S): 2 INJECTION INTRAMUSCULAR; INTRAVENOUS; SUBCUTANEOUS at 09:35

## 2019-10-25 RX ADMIN — PANTOPRAZOLE SODIUM 40 MILLIGRAM(S): 20 TABLET, DELAYED RELEASE ORAL at 05:41

## 2019-10-25 RX ADMIN — OXYCODONE AND ACETAMINOPHEN 2 TABLET(S): 5; 325 TABLET ORAL at 00:48

## 2019-10-25 RX ADMIN — Medication 1: at 09:00

## 2019-10-25 RX ADMIN — HYDROMORPHONE HYDROCHLORIDE 1 MILLIGRAM(S): 2 INJECTION INTRAMUSCULAR; INTRAVENOUS; SUBCUTANEOUS at 23:15

## 2019-10-25 RX ADMIN — HYDROMORPHONE HYDROCHLORIDE 1 MILLIGRAM(S): 2 INJECTION INTRAMUSCULAR; INTRAVENOUS; SUBCUTANEOUS at 13:51

## 2019-10-25 RX ADMIN — OXYCODONE AND ACETAMINOPHEN 2 TABLET(S): 5; 325 TABLET ORAL at 05:13

## 2019-10-25 RX ADMIN — INSULIN GLARGINE 30 UNIT(S): 100 INJECTION, SOLUTION SUBCUTANEOUS at 21:40

## 2019-10-25 RX ADMIN — PIPERACILLIN AND TAZOBACTAM 25 GRAM(S): 4; .5 INJECTION, POWDER, LYOPHILIZED, FOR SOLUTION INTRAVENOUS at 10:52

## 2019-10-25 RX ADMIN — HYDROMORPHONE HYDROCHLORIDE 1 MILLIGRAM(S): 2 INJECTION INTRAMUSCULAR; INTRAVENOUS; SUBCUTANEOUS at 10:05

## 2019-10-25 RX ADMIN — OXYCODONE AND ACETAMINOPHEN 2 TABLET(S): 5; 325 TABLET ORAL at 04:43

## 2019-10-25 RX ADMIN — Medication 12 UNIT(S): at 18:28

## 2019-10-25 RX ADMIN — HYDROMORPHONE HYDROCHLORIDE 1 MILLIGRAM(S): 2 INJECTION INTRAMUSCULAR; INTRAVENOUS; SUBCUTANEOUS at 18:50

## 2019-10-25 RX ADMIN — GABAPENTIN 100 MILLIGRAM(S): 400 CAPSULE ORAL at 05:41

## 2019-10-25 RX ADMIN — HYDROMORPHONE HYDROCHLORIDE 1 MILLIGRAM(S): 2 INJECTION INTRAMUSCULAR; INTRAVENOUS; SUBCUTANEOUS at 14:21

## 2019-10-25 RX ADMIN — TIOTROPIUM BROMIDE 1 CAPSULE(S): 18 CAPSULE ORAL; RESPIRATORY (INHALATION) at 12:38

## 2019-10-25 RX ADMIN — PIPERACILLIN AND TAZOBACTAM 25 GRAM(S): 4; .5 INJECTION, POWDER, LYOPHILIZED, FOR SOLUTION INTRAVENOUS at 01:21

## 2019-10-25 RX ADMIN — Medication 1 MILLIGRAM(S): at 12:37

## 2019-10-25 RX ADMIN — Medication 325 MILLIGRAM(S): at 04:14

## 2019-10-25 RX ADMIN — HYDROMORPHONE HYDROCHLORIDE 1 MILLIGRAM(S): 2 INJECTION INTRAMUSCULAR; INTRAVENOUS; SUBCUTANEOUS at 21:46

## 2019-10-25 RX ADMIN — HYDROMORPHONE HYDROCHLORIDE 1 MILLIGRAM(S): 2 INJECTION INTRAMUSCULAR; INTRAVENOUS; SUBCUTANEOUS at 06:17

## 2019-10-25 RX ADMIN — HYDROMORPHONE HYDROCHLORIDE 1 MILLIGRAM(S): 2 INJECTION INTRAMUSCULAR; INTRAVENOUS; SUBCUTANEOUS at 05:39

## 2019-10-25 RX ADMIN — DIVALPROEX SODIUM 500 MILLIGRAM(S): 500 TABLET, DELAYED RELEASE ORAL at 12:37

## 2019-10-25 RX ADMIN — LEVETIRACETAM 500 MILLIGRAM(S): 250 TABLET, FILM COATED ORAL at 05:41

## 2019-10-25 RX ADMIN — HEPARIN SODIUM 5000 UNIT(S): 5000 INJECTION INTRAVENOUS; SUBCUTANEOUS at 18:24

## 2019-10-25 RX ADMIN — HYDROMORPHONE HYDROCHLORIDE 1 MILLIGRAM(S): 2 INJECTION INTRAMUSCULAR; INTRAVENOUS; SUBCUTANEOUS at 18:20

## 2019-10-25 RX ADMIN — HYDROMORPHONE HYDROCHLORIDE 1 MILLIGRAM(S): 2 INJECTION INTRAMUSCULAR; INTRAVENOUS; SUBCUTANEOUS at 01:21

## 2019-10-25 RX ADMIN — Medication 2: at 18:26

## 2019-10-25 RX ADMIN — GABAPENTIN 100 MILLIGRAM(S): 400 CAPSULE ORAL at 21:05

## 2019-10-25 RX ADMIN — AMLODIPINE BESYLATE 10 MILLIGRAM(S): 2.5 TABLET ORAL at 05:41

## 2019-10-25 RX ADMIN — PIPERACILLIN AND TAZOBACTAM 25 GRAM(S): 4; .5 INJECTION, POWDER, LYOPHILIZED, FOR SOLUTION INTRAVENOUS at 18:23

## 2019-10-25 RX ADMIN — Medication 12 UNIT(S): at 09:00

## 2019-10-25 RX ADMIN — OXYCODONE AND ACETAMINOPHEN 2 TABLET(S): 5; 325 TABLET ORAL at 21:35

## 2019-10-25 RX ADMIN — OXYCODONE AND ACETAMINOPHEN 2 TABLET(S): 5; 325 TABLET ORAL at 11:52

## 2019-10-25 RX ADMIN — Medication 250 MILLIGRAM(S): at 09:52

## 2019-10-25 RX ADMIN — LEVETIRACETAM 500 MILLIGRAM(S): 250 TABLET, FILM COATED ORAL at 18:23

## 2019-10-25 RX ADMIN — GABAPENTIN 100 MILLIGRAM(S): 400 CAPSULE ORAL at 12:38

## 2019-10-25 RX ADMIN — Medication 325 MILLIGRAM(S): at 03:44

## 2019-10-25 RX ADMIN — OXYCODONE AND ACETAMINOPHEN 2 TABLET(S): 5; 325 TABLET ORAL at 12:22

## 2019-10-25 RX ADMIN — HYDROMORPHONE HYDROCHLORIDE 1 MILLIGRAM(S): 2 INJECTION INTRAMUSCULAR; INTRAVENOUS; SUBCUTANEOUS at 02:07

## 2019-10-25 RX ADMIN — MIRTAZAPINE 30 MILLIGRAM(S): 45 TABLET, ORALLY DISINTEGRATING ORAL at 11:53

## 2019-10-25 RX ADMIN — OXYCODONE AND ACETAMINOPHEN 2 TABLET(S): 5; 325 TABLET ORAL at 21:05

## 2019-10-25 RX ADMIN — Medication 325 MILLIGRAM(S): at 12:37

## 2019-10-25 RX ADMIN — Medication 12 UNIT(S): at 12:37

## 2019-10-25 NOTE — PHYSICAL THERAPY INITIAL EVALUATION ADULT - PERTINENT HX OF CURRENT PROBLEM, REHAB EVAL
63 y.o. male with multiple chronic medical issues including DM, HTN, and epilepsy was transferred from [facility] for evaluation of a possible ? discitis / osteomyelitis following a mechanical fall landing on his tailbone when he tried to get up from his wheelchair 5 days ago.

## 2019-10-25 NOTE — PROGRESS NOTE ADULT - ASSESSMENT
62 yo male with DM, HTN, and epilepsy was transferred from [facility] for possible T12-L1 discitis / osteomyelitis following a mechanical fall 5 days ago. Currently receiving IV Vanc/Zosyn for right foot osteomyelitis. Was seen at [facility] 10/20 where a CT abd/pelvis found an enlarged psoas muscle with ? abscesses with possible extension to the T12 L1 disc with a compression fracture suspicious for osteomyelitis.      Overall foot OM, concern for psoas abscess, spinal OM, elevated ESR. Uncontrolled DM.       Plan:   - MRI with contrast still pending   - f/u BCx, NTD.   - c/w Zosyn,   - C/W Vanc 1g q12. Per patient, has completed more than a week therapy.   - trough on the lower side but will continue with the same dose.   - S/p Neurosurgery eval   - s/p podiatry eval.   - Positive Hep C serology, viral load almost undetectable.   - U/S with iliopsoas hematoma not abscess.

## 2019-10-25 NOTE — PHYSICAL THERAPY INITIAL EVALUATION ADULT - ACTIVE RANGE OF MOTION EXAMINATION, REHAB EVAL
except elda shoulder flexion 0-90/bilateral upper extremity Active ROM was WFL (within functional limits)/bilateral  lower extremity Active ROM was WFL (within functional limits)

## 2019-10-25 NOTE — PHYSICAL THERAPY INITIAL EVALUATION ADULT - ADDITIONAL COMMENTS
Pt lives alone in an apt with + elevator, Pt had HHA 5 hours x 3 d. Pt used wheelchair for mobility b/c waiting for shoes with prosthesis inside for R foot to ambulate. Info obtained from pt. (continuation of H&P) Pods c/s : no surgical intervention. Neurosurgery : no surgical intervention. Pt had GIB 10/23, s/p 2 units blood. Pt lives alone in an apt with + elevator, Pt had HHA 5 hours x 3 d. Pt used wheelchair for mobility b/c waiting for shoes with prosthesis inside for R foot to ambulate. Info obtained from pt.

## 2019-10-25 NOTE — PROGRESS NOTE ADULT - ASSESSMENT
63M with bilateral foot ulcerations - chronic   -pt seen and evaluated   -chronic ulcerations of bilateral feet both probing to bone but no acute signs of infection   -Pt has had multiple operations for his feet and states is currently on a PICC line - unclear exactly when it was placed. States has been going to wound care center at [facility] and wounds have been slowly improving   -recommend obtaining records from [facility] to show what pt was PICCd on / duration   -pt uninterested in any further amputation at this time, would like to continue local wound care and abx regimen  -No plan for podiatric surgical intervention   -Continue with santyl + DSD daily b/l   -Recommend Zflows to be worn while in bed   - left foot xr shows chronic Osteomyuelits of the 3rd met head; R foot xray shows possible Osteomyelitis of the talus   -Will continue to follow   -seen by attending 63M with bilateral foot ulcerations, chronic   -Pt seen and evaluated   -Chronic ulcerations of bilateral feet both probing to bone but no acute signs of infection   -Pt has had multiple operations for his feet and states is currently on a PICC line - unclear exactly when it was placed. States has been going to wound care center at [facility] and wounds have been slowly improving   -Recommend obtaining records from [facility] to show what pt was PICCd on / duration   -Left foot XR shows chronic osteo of the 3rd met head; Right foot XR shows possible osteo of the talus   -Pt uninterested in any further amputation at this time, would like to continue local wound care and abx regimen  -No plan for podiatric surgical intervention   -Continue with santyl + DSD to b/l feet daily  -Recommend Z flows to be worn at all times in bed  -Will continue to follow  -Seen w/ attending

## 2019-10-25 NOTE — PROGRESS NOTE ADULT - ASSESSMENT
62 yo male with DM, HTN, and epilepsy was transferred from [facility] for possible T12-L1 discitis / osteomyelitis following a mechanical fall 5 days ago. GI consulted for occult positive anemia.

## 2019-10-25 NOTE — PROGRESS NOTE ADULT - SUBJECTIVE AND OBJECTIVE BOX
Chief complaint  Patient is a 63y old  Male who presents with a chief complaint of Back pain (24 Oct 2019 14:22)   Review of systems  Patient in bed, looks comfortable, no fever, no hypoglycemia.    Labs and Fingersticks  CAPILLARY BLOOD GLUCOSE      POCT Blood Glucose.: 159 mg/dL (25 Oct 2019 08:13)  POCT Blood Glucose.: 171 mg/dL (24 Oct 2019 22:07)  POCT Blood Glucose.: 233 mg/dL (24 Oct 2019 17:12)  POCT Blood Glucose.: 180 mg/dL (24 Oct 2019 12:28)      Anion Gap, Serum: 11 (10-25 @ 05:46)  Anion Gap, Serum: 11 (10-24 @ 06:31)      Calcium, Total Serum: 9.2 (10-25 @ 05:46)  Calcium, Total Serum: 9.3 (10-24 @ 06:31)          10-25    137  |  101  |  9   ----------------------------<  155<H>  3.8   |  25  |  1.09    Ca    9.2      25 Oct 2019 05:46                          9.1    9.14  )-----------( 252      ( 25 Oct 2019 08:19 )             28.6     Medications  MEDICATIONS  (STANDING):  amLODIPine   Tablet 10 milliGRAM(s) Oral daily  chlorhexidine 4% Liquid 1 Application(s) Topical <User Schedule>  dextrose 5%. 1000 milliLiter(s) (50 mL/Hr) IV Continuous <Continuous>  dextrose 50% Injectable 12.5 Gram(s) IV Push once  dextrose 50% Injectable 25 Gram(s) IV Push once  dextrose 50% Injectable 25 Gram(s) IV Push once  diVALproex  milliGRAM(s) Oral daily  ferrous    sulfate 325 milliGRAM(s) Oral daily  folic acid 1 milliGRAM(s) Oral daily  gabapentin 100 milliGRAM(s) Oral three times a day  heparin  Injectable 5000 Unit(s) SubCutaneous two times a day  insulin glargine Injectable (LANTUS) 30 Unit(s) SubCutaneous at bedtime  insulin lispro (HumaLOG) corrective regimen sliding scale   SubCutaneous three times a day before meals  insulin lispro (HumaLOG) corrective regimen sliding scale   SubCutaneous at bedtime  insulin lispro Injectable (HumaLOG) 12 Unit(s) SubCutaneous three times a day before meals  levETIRAcetam 500 milliGRAM(s) Oral two times a day  mirtazapine 30 milliGRAM(s) Oral daily  pantoprazole  Injectable 40 milliGRAM(s) IV Push two times a day  piperacillin/tazobactam IVPB.. 3.375 Gram(s) IV Intermittent every 8 hours  tiotropium 18 MICROgram(s) Capsule 1 Capsule(s) Inhalation daily  vancomycin  IVPB 1000 milliGRAM(s) IV Intermittent every 12 hours      Physical Exam  General: Patient comfortable in bed  Vital Signs Last 12 Hrs  T(F): 97.8 (10-25-19 @ 05:38), Max: 98.2 (10-25-19 @ 00:20)  HR: 83 (10-25-19 @ 05:38) (83 - 92)  BP: 134/81 (10-25-19 @ 05:38) (134/81 - 145/81)  BP(mean): --  RR: 17 (10-25-19 @ 05:38) (17 - 18)  SpO2: 97% (10-25-19 @ 05:38) (97% - 97%)  Neck: No palpable thyroid nodules.  CVS: S1S2, No murmurs  Respiratory: No wheezing, no crepitations  GI: Abdomen soft, bowel sounds positive  Musculoskeletal:  edema lower extremities.   Skin: No skin rashes, no ecchymosis    Diagnostics Chief complaint  Patient is a 63y old  Male who presents with a chief complaint of Back pain (24 Oct 2019 14:22)   Review of systems  Patient in bed, looks comfortable, no fever,  no hypoglycemia.    Labs and Fingersticks  CAPILLARY BLOOD GLUCOSE      POCT Blood Glucose.: 159 mg/dL (25 Oct 2019 08:13)  POCT Blood Glucose.: 171 mg/dL (24 Oct 2019 22:07)  POCT Blood Glucose.: 233 mg/dL (24 Oct 2019 17:12)  POCT Blood Glucose.: 180 mg/dL (24 Oct 2019 12:28)      Anion Gap, Serum: 11 (10-25 @ 05:46)  Anion Gap, Serum: 11 (10-24 @ 06:31)      Calcium, Total Serum: 9.2 (10-25 @ 05:46)  Calcium, Total Serum: 9.3 (10-24 @ 06:31)          10-25    137  |  101  |  9   ----------------------------<  155<H>  3.8   |  25  |  1.09    Ca    9.2      25 Oct 2019 05:46                          9.1    9.14  )-----------( 252      ( 25 Oct 2019 08:19 )             28.6     Medications  MEDICATIONS  (STANDING):  amLODIPine   Tablet 10 milliGRAM(s) Oral daily  chlorhexidine 4% Liquid 1 Application(s) Topical <User Schedule>  dextrose 5%. 1000 milliLiter(s) (50 mL/Hr) IV Continuous <Continuous>  dextrose 50% Injectable 12.5 Gram(s) IV Push once  dextrose 50% Injectable 25 Gram(s) IV Push once  dextrose 50% Injectable 25 Gram(s) IV Push once  diVALproex  milliGRAM(s) Oral daily  ferrous    sulfate 325 milliGRAM(s) Oral daily  folic acid 1 milliGRAM(s) Oral daily  gabapentin 100 milliGRAM(s) Oral three times a day  heparin  Injectable 5000 Unit(s) SubCutaneous two times a day  insulin glargine Injectable (LANTUS) 30 Unit(s) SubCutaneous at bedtime  insulin lispro (HumaLOG) corrective regimen sliding scale   SubCutaneous three times a day before meals  insulin lispro (HumaLOG) corrective regimen sliding scale   SubCutaneous at bedtime  insulin lispro Injectable (HumaLOG) 12 Unit(s) SubCutaneous three times a day before meals  levETIRAcetam 500 milliGRAM(s) Oral two times a day  mirtazapine 30 milliGRAM(s) Oral daily  pantoprazole  Injectable 40 milliGRAM(s) IV Push two times a day  piperacillin/tazobactam IVPB.. 3.375 Gram(s) IV Intermittent every 8 hours  tiotropium 18 MICROgram(s) Capsule 1 Capsule(s) Inhalation daily  vancomycin  IVPB 1000 milliGRAM(s) IV Intermittent every 12 hours      Physical Exam  General: Patient comfortable in bed  Vital Signs Last 12 Hrs  T(F): 97.8 (10-25-19 @ 05:38), Max: 98.2 (10-25-19 @ 00:20)  HR: 83 (10-25-19 @ 05:38) (83 - 92)  BP: 134/81 (10-25-19 @ 05:38) (134/81 - 145/81)  BP(mean): --  RR: 17 (10-25-19 @ 05:38) (17 - 18)  SpO2: 97% (10-25-19 @ 05:38) (97% - 97%)  Neck: No palpable thyroid nodules.  CVS: S1S2, No murmurs  Respiratory: No wheezing, no crepitations  GI: Abdomen soft, bowel sounds positive  Musculoskeletal:  edema lower extremities.   Skin: No skin rashes, no ecchymosis    Diagnostics

## 2019-10-25 NOTE — PHYSICAL THERAPY INITIAL EVALUATION ADULT - BALANCE TRAINING, PT EVAL
Goal: Pt will improve balance one half grade to improve performance and safety of transfers  in 2 weeks.

## 2019-10-25 NOTE — PROGRESS NOTE ADULT - SUBJECTIVE AND OBJECTIVE BOX
63y old  Male who presents with a chief complaint of Back pain (25 Oct 2019 15:31)      Interval history:  Afebrile, complains of back pain, awaiting MRI, eating dinner.     Allergy Status Unknown    Antimicrobials:  piperacillin/tazobactam IVPB.. 3.375 Gram(s) IV Intermittent every 8 hours  vancomycin  IVPB 1000 milliGRAM(s) IV Intermittent every 12 hours    REVIEW OF SYSTEMS:  No chest pain  No SOB  No N/V  No dysuria  No rash.     Vital Signs Last 24 Hrs  T(C): 36.6 (10-25-19 @ 05:38), Max: 36.8 (10-24-19 @ 20:15)  T(F): 97.8 (10-25-19 @ 05:38), Max: 98.2 (10-24-19 @ 20:15)  HR: 84 (10-25-19 @ 16:08) (83 - 95)  BP: 137/79 (10-25-19 @ 16:08) (123/80 - 145/81)  BP(mean): --  RR: 17 (10-25-19 @ 05:38) (17 - 18)  SpO2: 99% (10-25-19 @ 16:08) (97% - 99%)      PHYSICAL EXAM:  Patient in no acute distress. Alert, awake. Sitting in chair.    No icterus, no oral ulcers.  Cardiovascular: S1S2 normal.  Lungs: + air entry B/L lung fields.  Gastrointestinal: soft, nontender, nondistended.  Extremities: no edema.  + PICC   b/l foot ulcers.                             9.1    9.14  )-----------( 252      ( 25 Oct 2019 08:19 )             28.6   10-25    137  |  101  |  9   ----------------------------<  155<H>  3.8   |  25  |  1.09    Ca    9.2      25 Oct 2019 05:46      Culture - Blood (10.21.19 @ 13:45)    Specimen Source: .Blood    Culture Results:   No growth to date.      Radiology:  < from: [facility] Duplex Upper Ext Vein Scan, Bilat (10.24.19 @ 17:38) >  IMPRESSION:     No evidence of deep venous thrombosis in either upper extremity.    Superficial thrombosis of the right cephalic vein.

## 2019-10-25 NOTE — PROGRESS NOTE ADULT - PROBLEM SELECTOR PLAN 1
-s/p  upper gastrointestinal endoscopy 10/24 with Mild gastritis  -No evidence of bleeding  -No pathology to explain anemia  - s/p colonoscopy 10/24 with the distal rectum and anal verge are normal on retroflexion view.  - The entire examined colon is normal.  - The examined portion of the ileum was normal.  - Diet as tolerated.  - Repeat colonoscopy in 5 years  - outpatient capsule study  - monitor h/h daily, transfuse prn

## 2019-10-25 NOTE — PROGRESS NOTE ADULT - SUBJECTIVE AND OBJECTIVE BOX
INTERVAL HPI/OVERNIGHT EVENTS:    s/p  upper gastrointestinal endoscopy  and colonoscopy with no bleeding found  pt admits to mild abdominal discomfort and nausea today  tolerating PO well without vomiting, passing flatus and having bms     MEDICATIONS  (STANDING):  amLODIPine   Tablet 10 milliGRAM(s) Oral daily  chlorhexidine 4% Liquid 1 Application(s) Topical <User Schedule>  dextrose 5%. 1000 milliLiter(s) (50 mL/Hr) IV Continuous <Continuous>  dextrose 50% Injectable 12.5 Gram(s) IV Push once  dextrose 50% Injectable 25 Gram(s) IV Push once  dextrose 50% Injectable 25 Gram(s) IV Push once  diVALproex  milliGRAM(s) Oral daily  ferrous    sulfate 325 milliGRAM(s) Oral daily  folic acid 1 milliGRAM(s) Oral daily  gabapentin 100 milliGRAM(s) Oral three times a day  heparin  Injectable 5000 Unit(s) SubCutaneous two times a day  insulin glargine Injectable (LANTUS) 30 Unit(s) SubCutaneous at bedtime  insulin lispro (HumaLOG) corrective regimen sliding scale   SubCutaneous three times a day before meals  insulin lispro (HumaLOG) corrective regimen sliding scale   SubCutaneous at bedtime  insulin lispro Injectable (HumaLOG) 12 Unit(s) SubCutaneous three times a day before meals  levETIRAcetam 500 milliGRAM(s) Oral two times a day  mirtazapine 30 milliGRAM(s) Oral daily  pantoprazole  Injectable 40 milliGRAM(s) IV Push two times a day  piperacillin/tazobactam IVPB.. 3.375 Gram(s) IV Intermittent every 8 hours  tiotropium 18 MICROgram(s) Capsule 1 Capsule(s) Inhalation daily  vancomycin  IVPB 1000 milliGRAM(s) IV Intermittent every 12 hours    MEDICATIONS  (PRN):  acetaminophen   Tablet .. 325 milliGRAM(s) Oral daily PRN Temp greater or equal to 38C (100.4F), Mild Pain (1 - 3)  ALBUTerol    90 MICROgram(s) HFA Inhaler 2 Puff(s) Inhalation every 6 hours PRN Shortness of Breath and/or Wheezing  dextrose 40% Gel 15 Gram(s) Oral once PRN Blood Glucose LESS THAN 70 milliGRAM(s)/deciliter  glucagon  Injectable 1 milliGRAM(s) IntraMuscular once PRN Glucose LESS THAN 70 milligrams/deciliter  HYDROmorphone  Injectable 1 milliGRAM(s) IV Push every 4 hours PRN Severe Pain (7 - 10)  oxyCODONE    5 mG/acetaminophen 325 mG 2 Tablet(s) Oral every 4 hours PRN Moderate Pain (4 - 6)  sodium chloride 0.9% lock flush 10 milliLiter(s) IV Push every 1 hour PRN Pre/post blood products, medications, blood draw, and to maintain line patency      Allergies    Allergy Status Unknown    Intolerances        Review of Systems:    General:  No wt loss, fevers, chills, night sweats,fatigue,   Eyes:  Good vision, no reported pain  ENT:  No sore throat, pain, runny nose, dysphagia  CV:  No pain, palpitations, hypo/hypertension  Resp:  No dyspnea, cough, tachypnea, wheezing  GI:  No pain, No nausea, No vomiting, No diarrhea, No constipation, No weight loss, No fever, No pruritis, No rectal bleeding, No melena, No dysphagia  :  No pain, bleeding, incontinence, nocturia  Muscle:  No pain, weakness  Neuro:  No weakness, tingling, memory problems  Psych:  No fatigue, insomnia, mood problems, depression  Endocrine:  No polyuria, polydypsia, cold/heat intolerance  Heme:  No petechiae, ecchymosis, easy bruisability  Skin:  No rash, tattoos, scars, edema      Vital Signs Last 24 Hrs  T(C): 36.6 (25 Oct 2019 05:38), Max: 36.8 (24 Oct 2019 20:15)  T(F): 97.8 (25 Oct 2019 05:38), Max: 98.2 (24 Oct 2019 20:15)  HR: 83 (25 Oct 2019 05:38) (83 - 95)  BP: 134/81 (25 Oct 2019 05:38) (123/80 - 145/81)  BP(mean): --  RR: 17 (25 Oct 2019 05:38) (17 - 18)  SpO2: 97% (25 Oct 2019 05:38) (97% - 97%)    PHYSICAL EXAM:    Constitutional: NAD, well-developed  HEENT: EOMI, throat clear  Neck: No LAD, supple  Respiratory: CTA and P  Cardiovascular: S1 and S2, RRR, no M  Gastrointestinal: BS+, soft, NT/ND, neg HSM,  Extremities: No peripheral edema, neg clubing, cyanosis  Vascular: 2+ peripheral pulses  Neurological: A/O x 3, no focal deficits  Psychiatric: Normal mood, normal affect  Skin: No rashes      LABS:                        9.1    9.14  )-----------( 252      ( 25 Oct 2019 08:19 )             28.6     10-25    137  |  101  |  9   ----------------------------<  155<H>  3.8   |  25  |  1.09    Ca    9.2      25 Oct 2019 05:46            RADIOLOGY & ADDITIONAL TESTS:

## 2019-10-25 NOTE — PHYSICAL THERAPY INITIAL EVALUATION ADULT - GENERAL OBSERVATIONS, REHAB EVAL
Pt received carine chair in NAD, VSS, +UE PICC line. Pt received sitting in chair in NAD, VSS, +UE PICC line, R foot choparts amputation; L foot 1st toe amp.

## 2019-10-25 NOTE — PHYSICAL THERAPY INITIAL EVALUATION ADULT - IMPAIRED TRANSFERS: BED/CHAIR, REHAB EVAL
decreased strength/impaired sensory feedback/impaired balance/decreased flexibility/pain/impaired postural control

## 2019-10-25 NOTE — PROGRESS NOTE ADULT - SUBJECTIVE AND OBJECTIVE BOX
Podiatry pager #: [phone] ([place])/ [phone] ([facility]    Patient is a 63y old  Male who presents with a chief complaint of Back pain (25 Oct 2019 14:40)       INTERVAL HPI/OVERNIGHT EVENTS:  Patient seen and evaluated at bedside.  Pt is resting comfortable in NAD. Denies N/V/F/C.     Allergies    Allergy Status Unknown    Intolerances        Vital Signs Last 24 Hrs  T(C): 36.6 (25 Oct 2019 05:38), Max: 36.8 (24 Oct 2019 20:15)  T(F): 97.8 (25 Oct 2019 05:38), Max: 98.2 (24 Oct 2019 20:15)  HR: 83 (25 Oct 2019 05:38) (83 - 95)  BP: 134/81 (25 Oct 2019 05:38) (123/80 - 145/81)  BP(mean): --  RR: 17 (25 Oct 2019 05:38) (17 - 18)  SpO2: 97% (25 Oct 2019 05:38) (97% - 97%)    LABS:                        9.1    9.14  )-----------( 252      ( 25 Oct 2019 08:19 )             28.6     10-25    137  |  101  |  9   ----------------------------<  155<H>  3.8   |  25  |  1.09    Ca    9.2      25 Oct 2019 05:46          CAPILLARY BLOOD GLUCOSE      POCT Blood Glucose.: 131 mg/dL (25 Oct 2019 12:24)  POCT Blood Glucose.: 159 mg/dL (25 Oct 2019 08:13)  POCT Blood Glucose.: 171 mg/dL (24 Oct 2019 22:07)  POCT Blood Glucose.: 233 mg/dL (24 Oct 2019 17:12)      Lower Extremity Physical Exam:  Vasular: DP/PT 2/4, B/L, non palpable DP Right foot, CFT < 3 seconds L, Temperature gradient wnl B/L.   Neuro: Epicritic sensation absent to the level of ankle B/L.  Musculoskeletal/Ortho: s/p Right foot choparts amputation and Left foot 1st toe amp  Skin: Right foot plantar Right foot stump ulceration, mostly granular tissue with track to bone, no purulence, no malodor, no cellulitis, no signs of infection   Left foot dorsal and plantar 3rd metatarsal head ulcer with fibrogranular base, probes to bone, no purulence/malodor/signs of infection    RADIOLOGY & ADDITIONAL TESTS:  < from: Xray Foot AP + Lateral + Oblique, Left (10.22.19 @ 10:04) >    EXAM:  FOOT COMPLETE LEFT (MIN 3 VIEWS)                            PROCEDURE DATE:  10/22/2019            INTERPRETATION:  Clinical information: Ulcers to the third metatarsal   bone.    3 views of the left foot are without comparison.    Impression: Status post first ray removal. The second digit is dislocated   medially and displaced proximally. There has been a resection from the   mid fifth metatarsal to the base of the fifth middle phalanx. There is   soft tissue lucency beneath the third metatarsal head. There is a   slightly sclerotic appearance to the third metatarsal which is   nonspecific although chronic osteomyelitis in the differential. MRI can   be performed for further evaluation if clinically warranted.                    MALINDA BRICE M.D., ATTENDING RADIOLOGIST  This document has been electronically signed. Oct 22 2019 12:20PM                < end of copied text >  < from: Xray Foot AP + Lateral + Oblique, Right (10.21.19 @ 10:57) >    EXAM:  FOOT COMPLETE RIGHT (MIN 3 VIEW)                            PROCEDURE DATE:  10/21/2019            INTERPRETATION:  Indication: Right foot osteomyelitis    Technique: 3 views of the right foot.    Comparison: None available.    Impression: The patient is status post amputation of the right forefoot.   There is cortical irregularity of the plantar surface of the calcaneus   and talus which may be secondary to underlying osteomyelitis. There is   marked soft tissue swelling/edema overlyingthe calcaneus. No significant   subcutaneous gas is seen. If clinically warranted, MRI may be performed   for further evaluation.                    CHEMO DERAS M.D., ATTENDING RADIOLOGIST  This document has been electronically signed. Oct 21 2019 11:09AM        < end of copied text >

## 2019-10-25 NOTE — PROGRESS NOTE ADULT - ASSESSMENT
Assessment  DMT2: 63y Male with DM T2 with hyperglycemia was on oral meds at home, now on basal bolus insulin, blood sugars improving, no hypoglycemic episode, eating meals, appears comfortable.  LE Abscess: on medications, afebrile, stable, monitored.  HTN: Controlled,  on antihypertensive medications.  GIB: s/p egd/colonoscopy, ZEHRA Alatorre MD  Cell: [phone]  Office: [phone] Assessment  DMT2: 63y Male with DM T2 with hyperglycemia was on oral meds at home, now on basal bolus insulin,  blood sugars improving, no hypoglycemic episode, eating meals, appears comfortable.  LE Abscess: on medications, afebrile, stable, monitored.  HTN: Controlled,  on antihypertensive medications.  GIB: s/p egd/colonoscopy, ZEHRA Alatorre MD  Cell: [phone]  Office: [phone]

## 2019-10-26 LAB
CULTURE RESULTS: SIGNIFICANT CHANGE UP
CULTURE RESULTS: SIGNIFICANT CHANGE UP
GLUCOSE BLDC GLUCOMTR-MCNC: 105 MG/DL — HIGH (ref 70–99)
GLUCOSE BLDC GLUCOMTR-MCNC: 129 MG/DL — HIGH (ref 70–99)
GLUCOSE BLDC GLUCOMTR-MCNC: 167 MG/DL — HIGH (ref 70–99)
GLUCOSE BLDC GLUCOMTR-MCNC: 191 MG/DL — HIGH (ref 70–99)
SPECIMEN SOURCE: SIGNIFICANT CHANGE UP
SPECIMEN SOURCE: SIGNIFICANT CHANGE UP

## 2019-10-26 RX ADMIN — Medication 12 UNIT(S): at 12:11

## 2019-10-26 RX ADMIN — HYDROMORPHONE HYDROCHLORIDE 1 MILLIGRAM(S): 2 INJECTION INTRAMUSCULAR; INTRAVENOUS; SUBCUTANEOUS at 02:30

## 2019-10-26 RX ADMIN — CHLORHEXIDINE GLUCONATE 1 APPLICATION(S): 213 SOLUTION TOPICAL at 05:48

## 2019-10-26 RX ADMIN — Medication 325 MILLIGRAM(S): at 12:13

## 2019-10-26 RX ADMIN — HEPARIN SODIUM 5000 UNIT(S): 5000 INJECTION INTRAVENOUS; SUBCUTANEOUS at 17:35

## 2019-10-26 RX ADMIN — LEVETIRACETAM 500 MILLIGRAM(S): 250 TABLET, FILM COATED ORAL at 05:47

## 2019-10-26 RX ADMIN — TIOTROPIUM BROMIDE 1 CAPSULE(S): 18 CAPSULE ORAL; RESPIRATORY (INHALATION) at 11:20

## 2019-10-26 RX ADMIN — OXYCODONE AND ACETAMINOPHEN 2 TABLET(S): 5; 325 TABLET ORAL at 23:29

## 2019-10-26 RX ADMIN — DIVALPROEX SODIUM 500 MILLIGRAM(S): 500 TABLET, DELAYED RELEASE ORAL at 12:13

## 2019-10-26 RX ADMIN — Medication 250 MILLIGRAM(S): at 00:18

## 2019-10-26 RX ADMIN — PIPERACILLIN AND TAZOBACTAM 25 GRAM(S): 4; .5 INJECTION, POWDER, LYOPHILIZED, FOR SOLUTION INTRAVENOUS at 13:09

## 2019-10-26 RX ADMIN — PIPERACILLIN AND TAZOBACTAM 25 GRAM(S): 4; .5 INJECTION, POWDER, LYOPHILIZED, FOR SOLUTION INTRAVENOUS at 05:46

## 2019-10-26 RX ADMIN — PIPERACILLIN AND TAZOBACTAM 25 GRAM(S): 4; .5 INJECTION, POWDER, LYOPHILIZED, FOR SOLUTION INTRAVENOUS at 20:07

## 2019-10-26 RX ADMIN — GABAPENTIN 100 MILLIGRAM(S): 400 CAPSULE ORAL at 05:47

## 2019-10-26 RX ADMIN — HYDROMORPHONE HYDROCHLORIDE 1 MILLIGRAM(S): 2 INJECTION INTRAMUSCULAR; INTRAVENOUS; SUBCUTANEOUS at 03:18

## 2019-10-26 RX ADMIN — OXYCODONE AND ACETAMINOPHEN 2 TABLET(S): 5; 325 TABLET ORAL at 05:47

## 2019-10-26 RX ADMIN — HEPARIN SODIUM 5000 UNIT(S): 5000 INJECTION INTRAVENOUS; SUBCUTANEOUS at 05:48

## 2019-10-26 RX ADMIN — INSULIN GLARGINE 30 UNIT(S): 100 INJECTION, SOLUTION SUBCUTANEOUS at 22:17

## 2019-10-26 RX ADMIN — HYDROMORPHONE HYDROCHLORIDE 1 MILLIGRAM(S): 2 INJECTION INTRAMUSCULAR; INTRAVENOUS; SUBCUTANEOUS at 08:44

## 2019-10-26 RX ADMIN — HYDROMORPHONE HYDROCHLORIDE 1 MILLIGRAM(S): 2 INJECTION INTRAMUSCULAR; INTRAVENOUS; SUBCUTANEOUS at 20:06

## 2019-10-26 RX ADMIN — Medication 250 MILLIGRAM(S): at 11:19

## 2019-10-26 RX ADMIN — OXYCODONE AND ACETAMINOPHEN 2 TABLET(S): 5; 325 TABLET ORAL at 11:20

## 2019-10-26 RX ADMIN — PANTOPRAZOLE SODIUM 40 MILLIGRAM(S): 20 TABLET, DELAYED RELEASE ORAL at 17:35

## 2019-10-26 RX ADMIN — LEVETIRACETAM 500 MILLIGRAM(S): 250 TABLET, FILM COATED ORAL at 17:35

## 2019-10-26 RX ADMIN — MIRTAZAPINE 30 MILLIGRAM(S): 45 TABLET, ORALLY DISINTEGRATING ORAL at 12:13

## 2019-10-26 RX ADMIN — OXYCODONE AND ACETAMINOPHEN 2 TABLET(S): 5; 325 TABLET ORAL at 18:08

## 2019-10-26 RX ADMIN — Medication 12 UNIT(S): at 08:20

## 2019-10-26 RX ADMIN — Medication 1 MILLIGRAM(S): at 12:12

## 2019-10-26 RX ADMIN — OXYCODONE AND ACETAMINOPHEN 2 TABLET(S): 5; 325 TABLET ORAL at 18:40

## 2019-10-26 RX ADMIN — HYDROMORPHONE HYDROCHLORIDE 1 MILLIGRAM(S): 2 INJECTION INTRAMUSCULAR; INTRAVENOUS; SUBCUTANEOUS at 15:50

## 2019-10-26 RX ADMIN — PANTOPRAZOLE SODIUM 40 MILLIGRAM(S): 20 TABLET, DELAYED RELEASE ORAL at 05:47

## 2019-10-26 RX ADMIN — OXYCODONE AND ACETAMINOPHEN 2 TABLET(S): 5; 325 TABLET ORAL at 11:50

## 2019-10-26 RX ADMIN — HYDROMORPHONE HYDROCHLORIDE 1 MILLIGRAM(S): 2 INJECTION INTRAMUSCULAR; INTRAVENOUS; SUBCUTANEOUS at 20:21

## 2019-10-26 RX ADMIN — GABAPENTIN 100 MILLIGRAM(S): 400 CAPSULE ORAL at 12:14

## 2019-10-26 RX ADMIN — AMLODIPINE BESYLATE 10 MILLIGRAM(S): 2.5 TABLET ORAL at 05:47

## 2019-10-26 RX ADMIN — HYDROMORPHONE HYDROCHLORIDE 1 MILLIGRAM(S): 2 INJECTION INTRAMUSCULAR; INTRAVENOUS; SUBCUTANEOUS at 09:40

## 2019-10-26 RX ADMIN — HYDROMORPHONE HYDROCHLORIDE 1 MILLIGRAM(S): 2 INJECTION INTRAMUSCULAR; INTRAVENOUS; SUBCUTANEOUS at 14:49

## 2019-10-26 RX ADMIN — Medication 1: at 12:12

## 2019-10-26 RX ADMIN — GABAPENTIN 100 MILLIGRAM(S): 400 CAPSULE ORAL at 22:17

## 2019-10-26 RX ADMIN — Medication 12 UNIT(S): at 17:34

## 2019-10-26 NOTE — PROGRESS NOTE ADULT - ASSESSMENT
62 yo male with DM, HTN, and epilepsy was transferred from [facility] for possible T12-L1 discitis / osteomyelitis following a mechanical fall 5 days ago. GI consulted for occult positive anemia.

## 2019-10-26 NOTE — PROGRESS NOTE ADULT - SUBJECTIVE AND OBJECTIVE BOX
INTERVAL HPI/OVERNIGHT EVENTS:    pt seen and examined  he denies abdominal pain, n/v  tolerating PO well    MEDICATIONS  (STANDING):  amLODIPine   Tablet 10 milliGRAM(s) Oral daily  chlorhexidine 4% Liquid 1 Application(s) Topical <User Schedule>  dextrose 5%. 1000 milliLiter(s) (50 mL/Hr) IV Continuous <Continuous>  dextrose 50% Injectable 12.5 Gram(s) IV Push once  dextrose 50% Injectable 25 Gram(s) IV Push once  dextrose 50% Injectable 25 Gram(s) IV Push once  diVALproex  milliGRAM(s) Oral daily  ferrous    sulfate 325 milliGRAM(s) Oral daily  folic acid 1 milliGRAM(s) Oral daily  gabapentin 100 milliGRAM(s) Oral three times a day  heparin  Injectable 5000 Unit(s) SubCutaneous two times a day  insulin glargine Injectable (LANTUS) 30 Unit(s) SubCutaneous at bedtime  insulin lispro (HumaLOG) corrective regimen sliding scale   SubCutaneous three times a day before meals  insulin lispro (HumaLOG) corrective regimen sliding scale   SubCutaneous at bedtime  insulin lispro Injectable (HumaLOG) 12 Unit(s) SubCutaneous three times a day before meals  levETIRAcetam 500 milliGRAM(s) Oral two times a day  mirtazapine 30 milliGRAM(s) Oral daily  pantoprazole  Injectable 40 milliGRAM(s) IV Push two times a day  piperacillin/tazobactam IVPB.. 3.375 Gram(s) IV Intermittent every 8 hours  tiotropium 18 MICROgram(s) Capsule 1 Capsule(s) Inhalation daily  vancomycin  IVPB 1000 milliGRAM(s) IV Intermittent every 12 hours    MEDICATIONS  (PRN):  acetaminophen   Tablet .. 325 milliGRAM(s) Oral daily PRN Temp greater or equal to 38C (100.4F), Mild Pain (1 - 3)  ALBUTerol    90 MICROgram(s) HFA Inhaler 2 Puff(s) Inhalation every 6 hours PRN Shortness of Breath and/or Wheezing  dextrose 40% Gel 15 Gram(s) Oral once PRN Blood Glucose LESS THAN 70 milliGRAM(s)/deciliter  glucagon  Injectable 1 milliGRAM(s) IntraMuscular once PRN Glucose LESS THAN 70 milligrams/deciliter  HYDROmorphone  Injectable 1 milliGRAM(s) IV Push every 4 hours PRN Severe Pain (7 - 10)  oxyCODONE    5 mG/acetaminophen 325 mG 2 Tablet(s) Oral every 4 hours PRN Moderate Pain (4 - 6)  sodium chloride 0.9% lock flush 10 milliLiter(s) IV Push every 1 hour PRN Pre/post blood products, medications, blood draw, and to maintain line patency      Allergies    Allergy Status Unknown    Intolerances        Review of Systems:    General:  No wt loss, fevers, chills, night sweats,fatigue,   Eyes:  Good vision, no reported pain  ENT:  No sore throat, pain, runny nose, dysphagia  CV:  No pain, palpitations, hypo/hypertension  Resp:  No dyspnea, cough, tachypnea, wheezing  GI:  No pain, No nausea, No vomiting, No diarrhea, No constipation, No weight loss, No fever, No pruritis, No rectal bleeding, No melena, No dysphagia  :  No pain, bleeding, incontinence, nocturia  Muscle:  No pain, weakness  Neuro:  No weakness, tingling, memory problems  Psych:  No fatigue, insomnia, mood problems, depression  Endocrine:  No polyuria, polydypsia, cold/heat intolerance  Heme:  No petechiae, ecchymosis, easy bruisability  Skin:  No rash, tattoos, scars, edema      Vital Signs Last 24 Hrs  T(C): 36.7 (26 Oct 2019 10:34), Max: 36.9 (26 Oct 2019 01:06)  T(F): 98 (26 Oct 2019 10:34), Max: 98.4 (26 Oct 2019 01:06)  HR: 89 (26 Oct 2019 10:34) (81 - 89)  BP: 117/68 (26 Oct 2019 10:34) (117/68 - 157/74)  BP(mean): --  RR: 18 (26 Oct 2019 10:34) (17 - 18)  SpO2: 97% (26 Oct 2019 10:34) (97% - 99%)    PHYSICAL EXAM:    Constitutional: NAD, well-developed  HEENT: EOMI, throat clear  Neck: No LAD, supple  Respiratory: CTA and P  Cardiovascular: S1 and S2, RRR, no M  Gastrointestinal: BS+, soft, NT/ND, neg HSM,  Extremities: No peripheral edema, neg clubing, cyanosis  Vascular: 2+ peripheral pulses  Neurological: A/O x 3, no focal deficits  Psychiatric: Normal mood, normal affect  Skin: No rashes      LABS:                        9.1    9.14  )-----------( 252      ( 25 Oct 2019 08:19 )             28.6     10-25    137  |  101  |  9   ----------------------------<  155<H>  3.8   |  25  |  1.09    Ca    9.2      25 Oct 2019 05:46            RADIOLOGY & ADDITIONAL TESTS:

## 2019-10-26 NOTE — PROGRESS NOTE ADULT - SUBJECTIVE AND OBJECTIVE BOX
Chief complaint  Patient is a 63y old  Male who presents with a chief complaint of Back pain (26 Oct 2019 16:30)   Review of systems  Patient in bed, looks comfortable, no fever, no hypoglycemia.    Labs and Fingersticks  CAPILLARY BLOOD GLUCOSE      POCT Blood Glucose.: 105 mg/dL (26 Oct 2019 17:04)  POCT Blood Glucose.: 191 mg/dL (26 Oct 2019 12:10)  POCT Blood Glucose.: 129 mg/dL (26 Oct 2019 08:09)      Anion Gap, Serum: 11 (10-25 @ 05:46)      Calcium, Total Serum: 9.2 (10-25 @ 05:46)          10-25    137  |  101  |  9   ----------------------------<  155<H>  3.8   |  25  |  1.09    Ca    9.2      25 Oct 2019 05:46                          9.1    9.14  )-----------( 252      ( 25 Oct 2019 08:19 )             28.6     Medications  MEDICATIONS  (STANDING):  amLODIPine   Tablet 10 milliGRAM(s) Oral daily  chlorhexidine 4% Liquid 1 Application(s) Topical <User Schedule>  dextrose 5%. 1000 milliLiter(s) (50 mL/Hr) IV Continuous <Continuous>  dextrose 50% Injectable 12.5 Gram(s) IV Push once  dextrose 50% Injectable 25 Gram(s) IV Push once  dextrose 50% Injectable 25 Gram(s) IV Push once  diVALproex  milliGRAM(s) Oral daily  ferrous    sulfate 325 milliGRAM(s) Oral daily  folic acid 1 milliGRAM(s) Oral daily  gabapentin 100 milliGRAM(s) Oral three times a day  heparin  Injectable 5000 Unit(s) SubCutaneous two times a day  insulin glargine Injectable (LANTUS) 30 Unit(s) SubCutaneous at bedtime  insulin lispro (HumaLOG) corrective regimen sliding scale   SubCutaneous three times a day before meals  insulin lispro (HumaLOG) corrective regimen sliding scale   SubCutaneous at bedtime  insulin lispro Injectable (HumaLOG) 12 Unit(s) SubCutaneous three times a day before meals  levETIRAcetam 500 milliGRAM(s) Oral two times a day  mirtazapine 30 milliGRAM(s) Oral daily  pantoprazole  Injectable 40 milliGRAM(s) IV Push two times a day  piperacillin/tazobactam IVPB.. 3.375 Gram(s) IV Intermittent every 8 hours  tiotropium 18 MICROgram(s) Capsule 1 Capsule(s) Inhalation daily  vancomycin  IVPB 1000 milliGRAM(s) IV Intermittent every 12 hours      Physical Exam  General: Patient comfortable in bed  Vital Signs Last 12 Hrs  T(F): 98 (10-26-19 @ 20:15), Max: 98 (10-26-19 @ 10:34)  HR: 99 (10-26-19 @ 20:15) (82 - 99)  BP: 131/81 (10-26-19 @ 20:15) (117/68 - 134/79)  BP(mean): --  RR: 18 (10-26-19 @ 20:15) (18 - 18)  SpO2: 95% (10-26-19 @ 20:15) (95% - 99%)  Neck: No palpable thyroid nodules.  CVS: S1S2, No murmurs  Respiratory: No wheezing, no crepitations  GI: Abdomen soft, bowel sounds positive  Musculoskeletal:  edema lower extremities.   Skin: No skin rashes, no ecchymosis    Diagnostics

## 2019-10-26 NOTE — PROGRESS NOTE ADULT - ASSESSMENT
Assessment  DMT2: 63y Male with DM T2 with hyperglycemia was on oral meds at home, now on insulin, FS in acceptable range, no hypoglycemic episode, eating meals, appears comfortable.  LE Abscess: on medications, afebrile, stable, monitored.  HTN: Controlled,  on antihypertensive medications.  GIB: s/p egd/colonoscopy, ZEHRA Alatorre MD  Cell: [phone]  Office: [phone]

## 2019-10-27 LAB
GLUCOSE BLDC GLUCOMTR-MCNC: 107 MG/DL — HIGH (ref 70–99)
GLUCOSE BLDC GLUCOMTR-MCNC: 147 MG/DL — HIGH (ref 70–99)
GLUCOSE BLDC GLUCOMTR-MCNC: 205 MG/DL — HIGH (ref 70–99)
GLUCOSE BLDC GLUCOMTR-MCNC: 208 MG/DL — HIGH (ref 70–99)
VANCOMYCIN TROUGH SERPL-MCNC: 14.7 UG/ML — SIGNIFICANT CHANGE UP (ref 10–20)

## 2019-10-27 RX ORDER — POLYETHYLENE GLYCOL 3350 17 G/17G
17 POWDER, FOR SOLUTION ORAL
Refills: 0 | Status: DISCONTINUED | OUTPATIENT
Start: 2019-10-27 | End: 2019-11-08

## 2019-10-27 RX ORDER — SENNA PLUS 8.6 MG/1
2 TABLET ORAL AT BEDTIME
Refills: 0 | Status: DISCONTINUED | OUTPATIENT
Start: 2019-10-27 | End: 2019-11-08

## 2019-10-27 RX ORDER — POLYETHYLENE GLYCOL 3350 17 G/17G
17 POWDER, FOR SOLUTION ORAL DAILY
Refills: 0 | Status: DISCONTINUED | OUTPATIENT
Start: 2019-10-27 | End: 2019-10-27

## 2019-10-27 RX ADMIN — OXYCODONE AND ACETAMINOPHEN 2 TABLET(S): 5; 325 TABLET ORAL at 08:30

## 2019-10-27 RX ADMIN — OXYCODONE AND ACETAMINOPHEN 2 TABLET(S): 5; 325 TABLET ORAL at 00:00

## 2019-10-27 RX ADMIN — PANTOPRAZOLE SODIUM 40 MILLIGRAM(S): 20 TABLET, DELAYED RELEASE ORAL at 17:54

## 2019-10-27 RX ADMIN — GABAPENTIN 100 MILLIGRAM(S): 400 CAPSULE ORAL at 06:15

## 2019-10-27 RX ADMIN — Medication 12 UNIT(S): at 17:53

## 2019-10-27 RX ADMIN — AMLODIPINE BESYLATE 10 MILLIGRAM(S): 2.5 TABLET ORAL at 06:14

## 2019-10-27 RX ADMIN — SENNA PLUS 2 TABLET(S): 8.6 TABLET ORAL at 21:17

## 2019-10-27 RX ADMIN — PIPERACILLIN AND TAZOBACTAM 25 GRAM(S): 4; .5 INJECTION, POWDER, LYOPHILIZED, FOR SOLUTION INTRAVENOUS at 14:17

## 2019-10-27 RX ADMIN — Medication 1 MILLIGRAM(S): at 12:35

## 2019-10-27 RX ADMIN — MIRTAZAPINE 30 MILLIGRAM(S): 45 TABLET, ORALLY DISINTEGRATING ORAL at 12:34

## 2019-10-27 RX ADMIN — INSULIN GLARGINE 30 UNIT(S): 100 INJECTION, SOLUTION SUBCUTANEOUS at 22:11

## 2019-10-27 RX ADMIN — Medication 10 MILLIGRAM(S): at 12:52

## 2019-10-27 RX ADMIN — OXYCODONE AND ACETAMINOPHEN 2 TABLET(S): 5; 325 TABLET ORAL at 13:30

## 2019-10-27 RX ADMIN — PIPERACILLIN AND TAZOBACTAM 25 GRAM(S): 4; .5 INJECTION, POWDER, LYOPHILIZED, FOR SOLUTION INTRAVENOUS at 06:16

## 2019-10-27 RX ADMIN — HYDROMORPHONE HYDROCHLORIDE 1 MILLIGRAM(S): 2 INJECTION INTRAMUSCULAR; INTRAVENOUS; SUBCUTANEOUS at 18:01

## 2019-10-27 RX ADMIN — LEVETIRACETAM 500 MILLIGRAM(S): 250 TABLET, FILM COATED ORAL at 17:54

## 2019-10-27 RX ADMIN — Medication 250 MILLIGRAM(S): at 00:33

## 2019-10-27 RX ADMIN — HYDROMORPHONE HYDROCHLORIDE 1 MILLIGRAM(S): 2 INJECTION INTRAMUSCULAR; INTRAVENOUS; SUBCUTANEOUS at 22:21

## 2019-10-27 RX ADMIN — Medication 325 MILLIGRAM(S): at 12:34

## 2019-10-27 RX ADMIN — HEPARIN SODIUM 5000 UNIT(S): 5000 INJECTION INTRAVENOUS; SUBCUTANEOUS at 17:53

## 2019-10-27 RX ADMIN — GABAPENTIN 100 MILLIGRAM(S): 400 CAPSULE ORAL at 14:17

## 2019-10-27 RX ADMIN — HEPARIN SODIUM 5000 UNIT(S): 5000 INJECTION INTRAVENOUS; SUBCUTANEOUS at 06:15

## 2019-10-27 RX ADMIN — OXYCODONE AND ACETAMINOPHEN 2 TABLET(S): 5; 325 TABLET ORAL at 12:52

## 2019-10-27 RX ADMIN — HYDROMORPHONE HYDROCHLORIDE 1 MILLIGRAM(S): 2 INJECTION INTRAMUSCULAR; INTRAVENOUS; SUBCUTANEOUS at 22:37

## 2019-10-27 RX ADMIN — HYDROMORPHONE HYDROCHLORIDE 1 MILLIGRAM(S): 2 INJECTION INTRAMUSCULAR; INTRAVENOUS; SUBCUTANEOUS at 14:17

## 2019-10-27 RX ADMIN — PANTOPRAZOLE SODIUM 40 MILLIGRAM(S): 20 TABLET, DELAYED RELEASE ORAL at 06:15

## 2019-10-27 RX ADMIN — Medication 12 UNIT(S): at 12:34

## 2019-10-27 RX ADMIN — TIOTROPIUM BROMIDE 1 CAPSULE(S): 18 CAPSULE ORAL; RESPIRATORY (INHALATION) at 12:34

## 2019-10-27 RX ADMIN — POLYETHYLENE GLYCOL 3350 17 GRAM(S): 17 POWDER, FOR SOLUTION ORAL at 17:54

## 2019-10-27 RX ADMIN — LEVETIRACETAM 500 MILLIGRAM(S): 250 TABLET, FILM COATED ORAL at 06:14

## 2019-10-27 RX ADMIN — Medication 250 MILLIGRAM(S): at 12:33

## 2019-10-27 RX ADMIN — OXYCODONE AND ACETAMINOPHEN 2 TABLET(S): 5; 325 TABLET ORAL at 21:21

## 2019-10-27 RX ADMIN — DIVALPROEX SODIUM 500 MILLIGRAM(S): 500 TABLET, DELAYED RELEASE ORAL at 12:34

## 2019-10-27 RX ADMIN — HYDROMORPHONE HYDROCHLORIDE 1 MILLIGRAM(S): 2 INJECTION INTRAMUSCULAR; INTRAVENOUS; SUBCUTANEOUS at 18:30

## 2019-10-27 RX ADMIN — HYDROMORPHONE HYDROCHLORIDE 1 MILLIGRAM(S): 2 INJECTION INTRAMUSCULAR; INTRAVENOUS; SUBCUTANEOUS at 00:26

## 2019-10-27 RX ADMIN — HYDROMORPHONE HYDROCHLORIDE 1 MILLIGRAM(S): 2 INJECTION INTRAMUSCULAR; INTRAVENOUS; SUBCUTANEOUS at 00:41

## 2019-10-27 RX ADMIN — HYDROMORPHONE HYDROCHLORIDE 1 MILLIGRAM(S): 2 INJECTION INTRAMUSCULAR; INTRAVENOUS; SUBCUTANEOUS at 04:23

## 2019-10-27 RX ADMIN — CHLORHEXIDINE GLUCONATE 1 APPLICATION(S): 213 SOLUTION TOPICAL at 06:24

## 2019-10-27 RX ADMIN — HYDROMORPHONE HYDROCHLORIDE 1 MILLIGRAM(S): 2 INJECTION INTRAMUSCULAR; INTRAVENOUS; SUBCUTANEOUS at 14:50

## 2019-10-27 RX ADMIN — Medication 2: at 12:34

## 2019-10-27 RX ADMIN — PIPERACILLIN AND TAZOBACTAM 25 GRAM(S): 4; .5 INJECTION, POWDER, LYOPHILIZED, FOR SOLUTION INTRAVENOUS at 21:17

## 2019-10-27 RX ADMIN — HYDROMORPHONE HYDROCHLORIDE 1 MILLIGRAM(S): 2 INJECTION INTRAMUSCULAR; INTRAVENOUS; SUBCUTANEOUS at 09:51

## 2019-10-27 RX ADMIN — OXYCODONE AND ACETAMINOPHEN 2 TABLET(S): 5; 325 TABLET ORAL at 08:04

## 2019-10-27 RX ADMIN — HYDROMORPHONE HYDROCHLORIDE 1 MILLIGRAM(S): 2 INJECTION INTRAMUSCULAR; INTRAVENOUS; SUBCUTANEOUS at 10:25

## 2019-10-27 RX ADMIN — Medication 12 UNIT(S): at 08:14

## 2019-10-27 RX ADMIN — HYDROMORPHONE HYDROCHLORIDE 1 MILLIGRAM(S): 2 INJECTION INTRAMUSCULAR; INTRAVENOUS; SUBCUTANEOUS at 04:38

## 2019-10-27 RX ADMIN — GABAPENTIN 100 MILLIGRAM(S): 400 CAPSULE ORAL at 21:17

## 2019-10-27 RX ADMIN — OXYCODONE AND ACETAMINOPHEN 2 TABLET(S): 5; 325 TABLET ORAL at 20:21

## 2019-10-27 NOTE — PROGRESS NOTE ADULT - ASSESSMENT
Assessment  DMT2: 63y Male with DM T2 with hyperglycemia was on oral meds at home, now on insulin, FS in acceptable range, no hypoglycemic episode, eating meals, appears comfortable.  LE Abscess: on IV ABx, afebrile, stable, monitored.  HTN: Controlled,  on antihypertensive medications.  GIB: s/p egd/colonoscopy + gastritis, FU GI        Jose Alatorre MD  Cell: [phone]  Office: [phone] Assessment  DMT2: 63y Male with DM T2 with hyperglycemia was on oral meds at home, now on insulin,  FS in acceptable range, no hypoglycemic episode, eating meals, appears comfortable.  LE Abscess: on IV ABx, afebrile, stable, monitored.  HTN: Controlled,  on antihypertensive medications.  GIB: s/p egd/colonoscopy + gastritis, FU GI        Jose Alatorre MD  Cell: [phone]  Office: [phone]

## 2019-10-27 NOTE — PROGRESS NOTE ADULT - PROBLEM SELECTOR PLAN 1
-s/p  upper gastrointestinal endoscopy 10/24 with Mild gastritis  -No evidence of bleeding  -No pathology to explain anemia  - s/p colonoscopy 10/24 with the distal rectum and anal verge are normal on retroflexion view.  - The entire examined colon is normal.  - The examined portion of the ileum was normal.  - Diet as tolerated.  - Repeat colonoscopy in 5 years  - outpatient capsule study  - monitor h/h daily, transfuse prn  - bowel regimen with senna 2 tabs at bedtime, miralax 17 grams BID, dulcolax supp prn constipation ordered

## 2019-10-27 NOTE — PROGRESS NOTE ADULT - SUBJECTIVE AND OBJECTIVE BOX
INTERVAL HPI/OVERNIGHT EVENTS:    pt seen and examined  he admits to constipation, denies n/v.  tolerating PO  requesting bowel regimen     MEDICATIONS  (STANDING):  amLODIPine   Tablet 10 milliGRAM(s) Oral daily  chlorhexidine 4% Liquid 1 Application(s) Topical <User Schedule>  dextrose 5%. 1000 milliLiter(s) (50 mL/Hr) IV Continuous <Continuous>  dextrose 50% Injectable 12.5 Gram(s) IV Push once  dextrose 50% Injectable 25 Gram(s) IV Push once  dextrose 50% Injectable 25 Gram(s) IV Push once  diVALproex  milliGRAM(s) Oral daily  ferrous    sulfate 325 milliGRAM(s) Oral daily  folic acid 1 milliGRAM(s) Oral daily  gabapentin 100 milliGRAM(s) Oral three times a day  heparin  Injectable 5000 Unit(s) SubCutaneous two times a day  insulin glargine Injectable (LANTUS) 30 Unit(s) SubCutaneous at bedtime  insulin lispro (HumaLOG) corrective regimen sliding scale   SubCutaneous three times a day before meals  insulin lispro (HumaLOG) corrective regimen sliding scale   SubCutaneous at bedtime  insulin lispro Injectable (HumaLOG) 12 Unit(s) SubCutaneous three times a day before meals  levETIRAcetam 500 milliGRAM(s) Oral two times a day  mirtazapine 30 milliGRAM(s) Oral daily  pantoprazole  Injectable 40 milliGRAM(s) IV Push two times a day  piperacillin/tazobactam IVPB.. 3.375 Gram(s) IV Intermittent every 8 hours  polyethylene glycol 3350 17 Gram(s) Oral two times a day  senna 2 Tablet(s) Oral at bedtime  tiotropium 18 MICROgram(s) Capsule 1 Capsule(s) Inhalation daily  vancomycin  IVPB 1000 milliGRAM(s) IV Intermittent every 12 hours    MEDICATIONS  (PRN):  acetaminophen   Tablet .. 325 milliGRAM(s) Oral daily PRN Temp greater or equal to 38C (100.4F), Mild Pain (1 - 3)  ALBUTerol    90 MICROgram(s) HFA Inhaler 2 Puff(s) Inhalation every 6 hours PRN Shortness of Breath and/or Wheezing  dextrose 40% Gel 15 Gram(s) Oral once PRN Blood Glucose LESS THAN 70 milliGRAM(s)/deciliter  glucagon  Injectable 1 milliGRAM(s) IntraMuscular once PRN Glucose LESS THAN 70 milligrams/deciliter  HYDROmorphone  Injectable 1 milliGRAM(s) IV Push every 4 hours PRN Severe Pain (7 - 10)  oxyCODONE    5 mG/acetaminophen 325 mG 2 Tablet(s) Oral every 4 hours PRN Moderate Pain (4 - 6)  sodium chloride 0.9% lock flush 10 milliLiter(s) IV Push every 1 hour PRN Pre/post blood products, medications, blood draw, and to maintain line patency      Allergies    Allergy Status Unknown    Intolerances        Review of Systems:    General:  No wt loss, fevers, chills, night sweats,fatigue,   Eyes:  Good vision, no reported pain  ENT:  No sore throat, pain, runny nose, dysphagia  CV:  No pain, palpitations, hypo/hypertension  Resp:  No dyspnea, cough, tachypnea, wheezing  GI:  No pain, No nausea, No vomiting, No diarrhea, + constipation, No weight loss, No fever, No pruritis, No rectal bleeding, No melena, No dysphagia  :  No pain, bleeding, incontinence, nocturia  Muscle:  No pain, weakness  Neuro:  No weakness, tingling, memory problems  Psych:  No fatigue, insomnia, mood problems, depression  Endocrine:  No polyuria, polydypsia, cold/heat intolerance  Heme:  No petechiae, ecchymosis, easy bruisability  Skin:  No rash, tattoos, scars, edema      Vital Signs Last 24 Hrs  T(C): 36.6 (27 Oct 2019 06:11), Max: 36.7 (26 Oct 2019 20:15)  T(F): 97.9 (27 Oct 2019 06:11), Max: 98.1 (27 Oct 2019 00:33)  HR: 83 (27 Oct 2019 06:11) (82 - 99)  BP: 160/83 (27 Oct 2019 06:11) (125/73 - 160/83)  BP(mean): --  RR: 18 (27 Oct 2019 06:11) (18 - 18)  SpO2: 97% (27 Oct 2019 06:11) (95% - 99%)    PHYSICAL EXAM:    Constitutional: NAD, well-developed  HEENT: EOMI, throat clear  Neck: No LAD, supple  Respiratory: CTA and P  Cardiovascular: S1 and S2, RRR, no M  Gastrointestinal: BS+, soft, NT/ND, neg HSM,  Extremities: No peripheral edema, neg clubing, cyanosis  Vascular: 2+ peripheral pulses  Neurological: A/O x 3, no focal deficits  Psychiatric: Normal mood, normal affect  Skin: No rashes      LABS:                RADIOLOGY & ADDITIONAL TESTS:

## 2019-10-27 NOTE — PROGRESS NOTE ADULT - SUBJECTIVE AND OBJECTIVE BOX
Patient is a 63y old  Male who presents with a chief complaint of Back pain (22 Oct 2019 08:44)      SUBJECTIVE / OVERNIGHT EVENTS:  No new events, Afebrile  Review of Systems:   CONSTITUTIONAL: No fever, weight loss, or fatigue  EYES: No eye pain, visual disturbances, or discharge  ENMT:  No difficulty hearing, tinnitus, vertigo; No sinus or throat pain  NECK: No pain or stiffness  BREASTS: No pain, masses, or nipple discharge  RESPIRATORY: No cough, wheezing, chills or hemoptysis; No shortness of breath  CARDIOVASCULAR: No chest pain, palpitations, dizziness, or leg swelling  GASTROINTESTINAL: No abdominal or epigastric pain. No nausea, vomiting, or hematemesis; No diarrhea or constipation. No melena or hematochezia.  GENITOURINARY: No dysuria, frequency, hematuria, or incontinence  NEUROLOGICAL: No headaches, memory loss, loss of strength, numbness, or tremors  SKIN: No itching, burning, rashes, or lesions   LYMPH NODES: No enlarged glands  ENDOCRINE: No heat or cold intolerance; No hair loss  MUSCULOSKELETAL: No joint pain or swelling;   PSYCHIATRIC: No depression, anxiety, mood swings, or difficulty sleeping  HEME/LYMPH: No easy bruising, or bleeding gums  ALLERY AND IMMUNOLOGIC: No hives or eczema    MEDICATIONS  (STANDING):  amLODIPine   Tablet 10 milliGRAM(s) Oral daily  apixaban 5 milliGRAM(s) Oral every 12 hours  chlorhexidine 4% Liquid 1 Application(s) Topical <User Schedule>  dextrose 5%. 1000 milliLiter(s) (50 mL/Hr) IV Continuous <Continuous>  dextrose 50% Injectable 12.5 Gram(s) IV Push once  dextrose 50% Injectable 25 Gram(s) IV Push once  dextrose 50% Injectable 25 Gram(s) IV Push once  diVALproex  milliGRAM(s) Oral daily  ferrous    sulfate 325 milliGRAM(s) Oral daily  folic acid 1 milliGRAM(s) Oral daily  gabapentin 100 milliGRAM(s) Oral three times a day  insulin glargine Injectable (LANTUS) 26 Unit(s) SubCutaneous at bedtime  insulin lispro (HumaLOG) corrective regimen sliding scale   SubCutaneous three times a day before meals  insulin lispro (HumaLOG) corrective regimen sliding scale   SubCutaneous at bedtime  insulin lispro Injectable (HumaLOG) 10 Unit(s) SubCutaneous three times a day before meals  levETIRAcetam 500 milliGRAM(s) Oral two times a day  mirtazapine 30 milliGRAM(s) Oral daily  piperacillin/tazobactam IVPB.. 3.375 Gram(s) IV Intermittent every 8 hours  tiotropium 18 MICROgram(s) Capsule 1 Capsule(s) Inhalation daily  vancomycin  IVPB 1000 milliGRAM(s) IV Intermittent every 12 hours    MEDICATIONS  (PRN):  acetaminophen   Tablet .. 325 milliGRAM(s) Oral daily PRN Temp greater or equal to 38C (100.4F), Mild Pain (1 - 3)  ALBUTerol    90 MICROgram(s) HFA Inhaler 2 Puff(s) Inhalation every 6 hours PRN Shortness of Breath and/or Wheezing  dextrose 40% Gel 15 Gram(s) Oral once PRN Blood Glucose LESS THAN 70 milliGRAM(s)/deciliter  glucagon  Injectable 1 milliGRAM(s) IntraMuscular once PRN Glucose LESS THAN 70 milligrams/deciliter  HYDROmorphone  Injectable 1 milliGRAM(s) IV Push every 4 hours PRN Severe Pain (7 - 10)  oxyCODONE    5 mG/acetaminophen 325 mG 2 Tablet(s) Oral every 4 hours PRN Moderate Pain (4 - 6)  sodium chloride 0.9% lock flush 10 milliLiter(s) IV Push every 1 hour PRN Pre/post blood products, medications, blood draw, and to maintain line patency      PHYSICAL EXAM:  Vital Signs Last 24 Hrs  T(C): 36.8 (22 Oct 2019 09:12), Max: 37.3 (21 Oct 2019 15:24)  T(F): 98.2 (22 Oct 2019 09:12), Max: 99.1 (21 Oct 2019 15:24)  HR: 89 (22 Oct 2019 09:12) (88 - 99)  BP: 162/87 (22 Oct 2019 09:12) (144/74 - 162/87)  BP(mean): --  RR: 18 (22 Oct 2019 09:12) (18 - 18)  SpO2: 98% (22 Oct 2019 09:12) (96% - 98%)  I&O's Summary    21 Oct 2019 07:01  -  22 Oct 2019 07:00  --------------------------------------------------------  IN: 720 mL / OUT: 650 mL / NET: 70 mL    22 Oct 2019 07:01  -  22 Oct 2019 12:52  --------------------------------------------------------  IN: 600 mL / OUT: 450 mL / NET: 150 mL      GENERAL: NAD, well-developed  HEAD:  Atraumatic, Normocephalic  EYES: EOMI, PERRLA, conjunctiva and sclera clear  NECK: Supple, No JVD  CHEST/LUNG: Clear to auscultation bilaterally; No wheeze  HEART: Regular rate and rhythm; No murmurs, rubs, or gallops  ABDOMEN: Soft, Nontender, Nondistended; Bowel sounds present  EXTREMITIES:  elda TMA  PSYCH: AAOx3  NEUROLOGY: non-focal  SKIN: No rashes or lesions    LABS:  CAPILLARY BLOOD GLUCOSE      POCT Blood Glucose.: 157 mg/dL (22 Oct 2019 11:59)  POCT Blood Glucose.: 280 mg/dL (22 Oct 2019 08:57)  POCT Blood Glucose.: 299 mg/dL (21 Oct 2019 22:05)  POCT Blood Glucose.: 260 mg/dL (21 Oct 2019 17:11)                          8.3    9.26  )-----------( 401      ( 21 Oct 2019 09:40 )             26.8     10-21    135  |  100  |  11  ----------------------------<  184<H>  3.9   |  25  |  1.16    Ca    9.2      21 Oct 2019 09:40    TPro  8.9<H>  /  Alb  3.2<L>  /  TBili  0.3  /  DBili  x   /  AST  13  /  ALT  7<L>  /  AlkPhos  66  10-21    PT/INR - ( 21 Oct 2019 09:40 )   PT: 12.6 sec;   INR: 1.09 ratio         PTT - ( 21 Oct 2019 09:40 )  PTT:30.3 sec          RADIOLOGY & ADDITIONAL TESTS:    Imaging Personally Reviewed:    Consultant(s) Notes Reviewed:      Care Discussed with Consultants/Other Providers:

## 2019-10-27 NOTE — PROGRESS NOTE ADULT - SUBJECTIVE AND OBJECTIVE BOX
Chief complaint  Patient is a 63y old  Male who presents with a chief complaint of Back pain (27 Oct 2019 11:30)   Review of systems  Patient in bed, looks comfortable, no fever, no hypoglycemia.    Labs and Fingersticks  CAPILLARY BLOOD GLUCOSE      POCT Blood Glucose.: 208 mg/dL (27 Oct 2019 12:14)  POCT Blood Glucose.: 147 mg/dL (27 Oct 2019 08:13)  POCT Blood Glucose.: 167 mg/dL (26 Oct 2019 22:07)  POCT Blood Glucose.: 105 mg/dL (26 Oct 2019 17:04)                        Medications  MEDICATIONS  (STANDING):  amLODIPine   Tablet 10 milliGRAM(s) Oral daily  chlorhexidine 4% Liquid 1 Application(s) Topical <User Schedule>  dextrose 5%. 1000 milliLiter(s) (50 mL/Hr) IV Continuous <Continuous>  dextrose 50% Injectable 12.5 Gram(s) IV Push once  dextrose 50% Injectable 25 Gram(s) IV Push once  dextrose 50% Injectable 25 Gram(s) IV Push once  diVALproex  milliGRAM(s) Oral daily  ferrous    sulfate 325 milliGRAM(s) Oral daily  folic acid 1 milliGRAM(s) Oral daily  gabapentin 100 milliGRAM(s) Oral three times a day  heparin  Injectable 5000 Unit(s) SubCutaneous two times a day  insulin glargine Injectable (LANTUS) 30 Unit(s) SubCutaneous at bedtime  insulin lispro (HumaLOG) corrective regimen sliding scale   SubCutaneous three times a day before meals  insulin lispro (HumaLOG) corrective regimen sliding scale   SubCutaneous at bedtime  insulin lispro Injectable (HumaLOG) 12 Unit(s) SubCutaneous three times a day before meals  levETIRAcetam 500 milliGRAM(s) Oral two times a day  mirtazapine 30 milliGRAM(s) Oral daily  pantoprazole  Injectable 40 milliGRAM(s) IV Push two times a day  piperacillin/tazobactam IVPB.. 3.375 Gram(s) IV Intermittent every 8 hours  polyethylene glycol 3350 17 Gram(s) Oral two times a day  senna 2 Tablet(s) Oral at bedtime  tiotropium 18 MICROgram(s) Capsule 1 Capsule(s) Inhalation daily  vancomycin  IVPB 1000 milliGRAM(s) IV Intermittent every 12 hours      Physical Exam  General: Patient comfortable in bed  Vital Signs Last 12 Hrs  T(F): 97.8 (10-27-19 @ 11:49), Max: 98.1 (10-27-19 @ 00:33)  HR: 90 (10-27-19 @ 11:49) (83 - 90)  BP: 150/90 (10-27-19 @ 11:49) (133/73 - 160/83)  BP(mean): --  RR: 18 (10-27-19 @ 11:49) (18 - 18)  SpO2: 97% (10-27-19 @ 11:49) (97% - 98%)  Neck: No palpable thyroid nodules.  CVS: S1S2, No murmurs  Respiratory: No wheezing, no crepitations  GI: Abdomen soft, bowel sounds positive  Musculoskeletal:  edema lower extremities.   Skin: No skin rashes, no ecchymosis    Diagnostics Chief complaint  Patient is a 63y old  Male who presents with a chief complaint of Back pain (27 Oct 2019 11:30)   Review of systems  Patient in bed, looks comfortable, no fever,   no hypoglycemia.    Labs and Fingersticks  CAPILLARY BLOOD GLUCOSE      POCT Blood Glucose.: 208 mg/dL (27 Oct 2019 12:14)  POCT Blood Glucose.: 147 mg/dL (27 Oct 2019 08:13)  POCT Blood Glucose.: 167 mg/dL (26 Oct 2019 22:07)  POCT Blood Glucose.: 105 mg/dL (26 Oct 2019 17:04)        Medications  MEDICATIONS  (STANDING):  amLODIPine   Tablet 10 milliGRAM(s) Oral daily  chlorhexidine 4% Liquid 1 Application(s) Topical <User Schedule>  dextrose 5%. 1000 milliLiter(s) (50 mL/Hr) IV Continuous <Continuous>  dextrose 50% Injectable 12.5 Gram(s) IV Push once  dextrose 50% Injectable 25 Gram(s) IV Push once  dextrose 50% Injectable 25 Gram(s) IV Push once  diVALproex  milliGRAM(s) Oral daily  ferrous    sulfate 325 milliGRAM(s) Oral daily  folic acid 1 milliGRAM(s) Oral daily  gabapentin 100 milliGRAM(s) Oral three times a day  heparin  Injectable 5000 Unit(s) SubCutaneous two times a day  insulin glargine Injectable (LANTUS) 30 Unit(s) SubCutaneous at bedtime  insulin lispro (HumaLOG) corrective regimen sliding scale   SubCutaneous three times a day before meals  insulin lispro (HumaLOG) corrective regimen sliding scale   SubCutaneous at bedtime  insulin lispro Injectable (HumaLOG) 12 Unit(s) SubCutaneous three times a day before meals  levETIRAcetam 500 milliGRAM(s) Oral two times a day  mirtazapine 30 milliGRAM(s) Oral daily  pantoprazole  Injectable 40 milliGRAM(s) IV Push two times a day  piperacillin/tazobactam IVPB.. 3.375 Gram(s) IV Intermittent every 8 hours  polyethylene glycol 3350 17 Gram(s) Oral two times a day  senna 2 Tablet(s) Oral at bedtime  tiotropium 18 MICROgram(s) Capsule 1 Capsule(s) Inhalation daily  vancomycin  IVPB 1000 milliGRAM(s) IV Intermittent every 12 hours      Physical Exam  General: Patient comfortable in bed  Vital Signs Last 12 Hrs  T(F): 97.8 (10-27-19 @ 11:49), Max: 98.1 (10-27-19 @ 00:33)  HR: 90 (10-27-19 @ 11:49) (83 - 90)  BP: 150/90 (10-27-19 @ 11:49) (133/73 - 160/83)  BP(mean): --  RR: 18 (10-27-19 @ 11:49) (18 - 18)  SpO2: 97% (10-27-19 @ 11:49) (97% - 98%)  Neck: No palpable thyroid nodules.  CVS: S1S2, No murmurs  Respiratory: No wheezing, no crepitations  GI: Abdomen soft, bowel sounds positive  Musculoskeletal:  edema lower extremities.   Skin: No skin rashes, no ecchymosis    Diagnostics

## 2019-10-28 LAB
ANION GAP SERPL CALC-SCNC: 11 MMOL/L — SIGNIFICANT CHANGE UP (ref 5–17)
BUN SERPL-MCNC: 12 MG/DL — SIGNIFICANT CHANGE UP (ref 7–23)
CALCIUM SERPL-MCNC: 9.7 MG/DL — SIGNIFICANT CHANGE UP (ref 8.4–10.5)
CHLORIDE SERPL-SCNC: 101 MMOL/L — SIGNIFICANT CHANGE UP (ref 96–108)
CO2 SERPL-SCNC: 26 MMOL/L — SIGNIFICANT CHANGE UP (ref 22–31)
CREAT SERPL-MCNC: 1.06 MG/DL — SIGNIFICANT CHANGE UP (ref 0.5–1.3)
GLUCOSE BLDC GLUCOMTR-MCNC: 133 MG/DL — HIGH (ref 70–99)
GLUCOSE BLDC GLUCOMTR-MCNC: 135 MG/DL — HIGH (ref 70–99)
GLUCOSE BLDC GLUCOMTR-MCNC: 169 MG/DL — HIGH (ref 70–99)
GLUCOSE BLDC GLUCOMTR-MCNC: 222 MG/DL — HIGH (ref 70–99)
GLUCOSE SERPL-MCNC: 115 MG/DL — HIGH (ref 70–99)
HCT VFR BLD CALC: 29.1 % — LOW (ref 39–50)
HGB BLD-MCNC: 9.2 G/DL — LOW (ref 13–17)
MCHC RBC-ENTMCNC: 28.2 PG — SIGNIFICANT CHANGE UP (ref 27–34)
MCHC RBC-ENTMCNC: 31.6 GM/DL — LOW (ref 32–36)
MCV RBC AUTO: 89.3 FL — SIGNIFICANT CHANGE UP (ref 80–100)
PLATELET # BLD AUTO: 240 K/UL — SIGNIFICANT CHANGE UP (ref 150–400)
POTASSIUM SERPL-MCNC: 3.9 MMOL/L — SIGNIFICANT CHANGE UP (ref 3.5–5.3)
POTASSIUM SERPL-SCNC: 3.9 MMOL/L — SIGNIFICANT CHANGE UP (ref 3.5–5.3)
RBC # BLD: 3.26 M/UL — LOW (ref 4.2–5.8)
RBC # FLD: 14.7 % — HIGH (ref 10.3–14.5)
SODIUM SERPL-SCNC: 138 MMOL/L — SIGNIFICANT CHANGE UP (ref 135–145)
WBC # BLD: 7.95 K/UL — SIGNIFICANT CHANGE UP (ref 3.8–10.5)
WBC # FLD AUTO: 7.95 K/UL — SIGNIFICANT CHANGE UP (ref 3.8–10.5)

## 2019-10-28 PROCEDURE — 72158 MRI LUMBAR SPINE W/O & W/DYE: CPT | Mod: 26

## 2019-10-28 PROCEDURE — 99232 SBSQ HOSP IP/OBS MODERATE 35: CPT

## 2019-10-28 PROCEDURE — 72157 MRI CHEST SPINE W/O & W/DYE: CPT | Mod: 26

## 2019-10-28 RX ORDER — HYDROMORPHONE HYDROCHLORIDE 2 MG/ML
2 INJECTION INTRAMUSCULAR; INTRAVENOUS; SUBCUTANEOUS ONCE
Refills: 0 | Status: DISCONTINUED | OUTPATIENT
Start: 2019-10-28 | End: 2019-10-28

## 2019-10-28 RX ORDER — HYDROMORPHONE HYDROCHLORIDE 2 MG/ML
1 INJECTION INTRAMUSCULAR; INTRAVENOUS; SUBCUTANEOUS EVERY 4 HOURS
Refills: 0 | Status: DISCONTINUED | OUTPATIENT
Start: 2019-10-28 | End: 2019-11-04

## 2019-10-28 RX ORDER — OXYCODONE AND ACETAMINOPHEN 5; 325 MG/1; MG/1
2 TABLET ORAL EVERY 4 HOURS
Refills: 0 | Status: DISCONTINUED | OUTPATIENT
Start: 2019-10-28 | End: 2019-10-31

## 2019-10-28 RX ADMIN — HYDROMORPHONE HYDROCHLORIDE 1 MILLIGRAM(S): 2 INJECTION INTRAMUSCULAR; INTRAVENOUS; SUBCUTANEOUS at 03:25

## 2019-10-28 RX ADMIN — PANTOPRAZOLE SODIUM 40 MILLIGRAM(S): 20 TABLET, DELAYED RELEASE ORAL at 05:56

## 2019-10-28 RX ADMIN — PIPERACILLIN AND TAZOBACTAM 25 GRAM(S): 4; .5 INJECTION, POWDER, LYOPHILIZED, FOR SOLUTION INTRAVENOUS at 21:42

## 2019-10-28 RX ADMIN — HYDROMORPHONE HYDROCHLORIDE 1 MILLIGRAM(S): 2 INJECTION INTRAMUSCULAR; INTRAVENOUS; SUBCUTANEOUS at 21:37

## 2019-10-28 RX ADMIN — Medication 250 MILLIGRAM(S): at 00:41

## 2019-10-28 RX ADMIN — SENNA PLUS 2 TABLET(S): 8.6 TABLET ORAL at 21:41

## 2019-10-28 RX ADMIN — Medication 250 MILLIGRAM(S): at 12:02

## 2019-10-28 RX ADMIN — Medication 1: at 12:35

## 2019-10-28 RX ADMIN — PIPERACILLIN AND TAZOBACTAM 25 GRAM(S): 4; .5 INJECTION, POWDER, LYOPHILIZED, FOR SOLUTION INTRAVENOUS at 05:55

## 2019-10-28 RX ADMIN — MIRTAZAPINE 30 MILLIGRAM(S): 45 TABLET, ORALLY DISINTEGRATING ORAL at 21:41

## 2019-10-28 RX ADMIN — OXYCODONE AND ACETAMINOPHEN 2 TABLET(S): 5; 325 TABLET ORAL at 06:29

## 2019-10-28 RX ADMIN — GABAPENTIN 100 MILLIGRAM(S): 400 CAPSULE ORAL at 21:41

## 2019-10-28 RX ADMIN — OXYCODONE AND ACETAMINOPHEN 2 TABLET(S): 5; 325 TABLET ORAL at 05:29

## 2019-10-28 RX ADMIN — LEVETIRACETAM 500 MILLIGRAM(S): 250 TABLET, FILM COATED ORAL at 05:55

## 2019-10-28 RX ADMIN — HYDROMORPHONE HYDROCHLORIDE 1 MILLIGRAM(S): 2 INJECTION INTRAMUSCULAR; INTRAVENOUS; SUBCUTANEOUS at 07:10

## 2019-10-28 RX ADMIN — HYDROMORPHONE HYDROCHLORIDE 2 MILLIGRAM(S): 2 INJECTION INTRAMUSCULAR; INTRAVENOUS; SUBCUTANEOUS at 18:37

## 2019-10-28 RX ADMIN — CHLORHEXIDINE GLUCONATE 1 APPLICATION(S): 213 SOLUTION TOPICAL at 05:55

## 2019-10-28 RX ADMIN — INSULIN GLARGINE 30 UNIT(S): 100 INJECTION, SOLUTION SUBCUTANEOUS at 22:02

## 2019-10-28 RX ADMIN — HYDROMORPHONE HYDROCHLORIDE 1 MILLIGRAM(S): 2 INJECTION INTRAMUSCULAR; INTRAVENOUS; SUBCUTANEOUS at 12:10

## 2019-10-28 RX ADMIN — POLYETHYLENE GLYCOL 3350 17 GRAM(S): 17 POWDER, FOR SOLUTION ORAL at 05:56

## 2019-10-28 RX ADMIN — AMLODIPINE BESYLATE 10 MILLIGRAM(S): 2.5 TABLET ORAL at 05:55

## 2019-10-28 RX ADMIN — PIPERACILLIN AND TAZOBACTAM 25 GRAM(S): 4; .5 INJECTION, POWDER, LYOPHILIZED, FOR SOLUTION INTRAVENOUS at 13:12

## 2019-10-28 RX ADMIN — GABAPENTIN 100 MILLIGRAM(S): 400 CAPSULE ORAL at 13:12

## 2019-10-28 RX ADMIN — Medication 325 MILLIGRAM(S): at 11:54

## 2019-10-28 RX ADMIN — Medication 12 UNIT(S): at 12:36

## 2019-10-28 RX ADMIN — Medication 1 MILLIGRAM(S): at 11:54

## 2019-10-28 RX ADMIN — HYDROMORPHONE HYDROCHLORIDE 1 MILLIGRAM(S): 2 INJECTION INTRAMUSCULAR; INTRAVENOUS; SUBCUTANEOUS at 11:50

## 2019-10-28 RX ADMIN — HEPARIN SODIUM 5000 UNIT(S): 5000 INJECTION INTRAVENOUS; SUBCUTANEOUS at 18:40

## 2019-10-28 RX ADMIN — LEVETIRACETAM 500 MILLIGRAM(S): 250 TABLET, FILM COATED ORAL at 18:40

## 2019-10-28 RX ADMIN — TIOTROPIUM BROMIDE 1 CAPSULE(S): 18 CAPSULE ORAL; RESPIRATORY (INHALATION) at 11:54

## 2019-10-28 RX ADMIN — GABAPENTIN 100 MILLIGRAM(S): 400 CAPSULE ORAL at 05:55

## 2019-10-28 RX ADMIN — OXYCODONE AND ACETAMINOPHEN 2 TABLET(S): 5; 325 TABLET ORAL at 00:40

## 2019-10-28 RX ADMIN — POLYETHYLENE GLYCOL 3350 17 GRAM(S): 17 POWDER, FOR SOLUTION ORAL at 18:40

## 2019-10-28 RX ADMIN — OXYCODONE AND ACETAMINOPHEN 2 TABLET(S): 5; 325 TABLET ORAL at 01:40

## 2019-10-28 RX ADMIN — HEPARIN SODIUM 5000 UNIT(S): 5000 INJECTION INTRAVENOUS; SUBCUTANEOUS at 05:55

## 2019-10-28 RX ADMIN — OXYCODONE AND ACETAMINOPHEN 2 TABLET(S): 5; 325 TABLET ORAL at 15:36

## 2019-10-28 RX ADMIN — HYDROMORPHONE HYDROCHLORIDE 1 MILLIGRAM(S): 2 INJECTION INTRAMUSCULAR; INTRAVENOUS; SUBCUTANEOUS at 07:35

## 2019-10-28 RX ADMIN — HYDROMORPHONE HYDROCHLORIDE 1 MILLIGRAM(S): 2 INJECTION INTRAMUSCULAR; INTRAVENOUS; SUBCUTANEOUS at 22:00

## 2019-10-28 RX ADMIN — HYDROMORPHONE HYDROCHLORIDE 1 MILLIGRAM(S): 2 INJECTION INTRAMUSCULAR; INTRAVENOUS; SUBCUTANEOUS at 03:10

## 2019-10-28 RX ADMIN — DIVALPROEX SODIUM 500 MILLIGRAM(S): 500 TABLET, DELAYED RELEASE ORAL at 11:54

## 2019-10-28 RX ADMIN — OXYCODONE AND ACETAMINOPHEN 2 TABLET(S): 5; 325 TABLET ORAL at 14:55

## 2019-10-28 RX ADMIN — Medication 12 UNIT(S): at 18:41

## 2019-10-28 RX ADMIN — PANTOPRAZOLE SODIUM 40 MILLIGRAM(S): 20 TABLET, DELAYED RELEASE ORAL at 18:40

## 2019-10-28 RX ADMIN — Medication 12 UNIT(S): at 08:41

## 2019-10-28 RX ADMIN — HYDROMORPHONE HYDROCHLORIDE 2 MILLIGRAM(S): 2 INJECTION INTRAMUSCULAR; INTRAVENOUS; SUBCUTANEOUS at 16:30

## 2019-10-28 NOTE — PROGRESS NOTE ADULT - SUBJECTIVE AND OBJECTIVE BOX
Chief complaint  Patient is a 63y old  Male who presents with a chief complaint of Back pain (27 Oct 2019 13:29)   Review of systems  Patient in bed, looks comfortable, no fever, no hypoglycemia.    Labs and Fingersticks  CAPILLARY BLOOD GLUCOSE      POCT Blood Glucose.: 133 mg/dL (28 Oct 2019 08:33)  POCT Blood Glucose.: 205 mg/dL (27 Oct 2019 21:45)  POCT Blood Glucose.: 107 mg/dL (27 Oct 2019 17:02)  POCT Blood Glucose.: 208 mg/dL (27 Oct 2019 12:14)      Anion Gap, Serum: 11 (10-28 @ 06:27)      Calcium, Total Serum: 9.7 (10-28 @ 06:27)          10-28    138  |  101  |  12  ----------------------------<  115<H>  3.9   |  26  |  1.06    Ca    9.7      28 Oct 2019 06:27                          9.2    7.95  )-----------( 240      ( 28 Oct 2019 07:46 )             29.1     Medications  MEDICATIONS  (STANDING):  amLODIPine   Tablet 10 milliGRAM(s) Oral daily  chlorhexidine 4% Liquid 1 Application(s) Topical <User Schedule>  dextrose 5%. 1000 milliLiter(s) (50 mL/Hr) IV Continuous <Continuous>  dextrose 50% Injectable 12.5 Gram(s) IV Push once  dextrose 50% Injectable 25 Gram(s) IV Push once  dextrose 50% Injectable 25 Gram(s) IV Push once  diVALproex  milliGRAM(s) Oral daily  ferrous    sulfate 325 milliGRAM(s) Oral daily  folic acid 1 milliGRAM(s) Oral daily  gabapentin 100 milliGRAM(s) Oral three times a day  heparin  Injectable 5000 Unit(s) SubCutaneous two times a day  insulin glargine Injectable (LANTUS) 30 Unit(s) SubCutaneous at bedtime  insulin lispro (HumaLOG) corrective regimen sliding scale   SubCutaneous three times a day before meals  insulin lispro (HumaLOG) corrective regimen sliding scale   SubCutaneous at bedtime  insulin lispro Injectable (HumaLOG) 12 Unit(s) SubCutaneous three times a day before meals  levETIRAcetam 500 milliGRAM(s) Oral two times a day  mirtazapine 30 milliGRAM(s) Oral daily  pantoprazole  Injectable 40 milliGRAM(s) IV Push two times a day  piperacillin/tazobactam IVPB.. 3.375 Gram(s) IV Intermittent every 8 hours  polyethylene glycol 3350 17 Gram(s) Oral two times a day  senna 2 Tablet(s) Oral at bedtime  tiotropium 18 MICROgram(s) Capsule 1 Capsule(s) Inhalation daily  vancomycin  IVPB 1000 milliGRAM(s) IV Intermittent every 12 hours      Physical Exam  General: Patient comfortable in bed  Vital Signs Last 12 Hrs  T(F): 98.1 (10-28-19 @ 09:15), Max: 98.6 (10-28-19 @ 00:00)  HR: 91 (10-28-19 @ 09:15) (82 - 98)  BP: 155/83 (10-28-19 @ 09:15) (108/69 - 155/83)  BP(mean): --  RR: 18 (10-28-19 @ 09:15) (18 - 18)  SpO2: 94% (10-28-19 @ 09:15) (94% - 98%)  Neck: No palpable thyroid nodules.  CVS: S1S2, No murmurs  Respiratory: No wheezing, no crepitations  GI: Abdomen soft, bowel sounds positive  Musculoskeletal:  edema lower extremities.   Skin: No skin rashes, no ecchymosis    Diagnostics Chief complaint  Patient is a 63y old  Male who presents with a chief complaint of Back pain (27 Oct 2019 13:29)   Review of systems  Patient in bed, looks comfortable, no fever,  no hypoglycemia.    Labs and Fingersticks  CAPILLARY BLOOD GLUCOSE      POCT Blood Glucose.: 133 mg/dL (28 Oct 2019 08:33)  POCT Blood Glucose.: 205 mg/dL (27 Oct 2019 21:45)  POCT Blood Glucose.: 107 mg/dL (27 Oct 2019 17:02)  POCT Blood Glucose.: 208 mg/dL (27 Oct 2019 12:14)      Anion Gap, Serum: 11 (10-28 @ 06:27)      Calcium, Total Serum: 9.7 (10-28 @ 06:27)          10-28    138  |  101  |  12  ----------------------------<  115<H>  3.9   |  26  |  1.06    Ca    9.7      28 Oct 2019 06:27                          9.2    7.95  )-----------( 240      ( 28 Oct 2019 07:46 )             29.1     Medications  MEDICATIONS  (STANDING):  amLODIPine   Tablet 10 milliGRAM(s) Oral daily  chlorhexidine 4% Liquid 1 Application(s) Topical <User Schedule>  dextrose 5%. 1000 milliLiter(s) (50 mL/Hr) IV Continuous <Continuous>  dextrose 50% Injectable 12.5 Gram(s) IV Push once  dextrose 50% Injectable 25 Gram(s) IV Push once  dextrose 50% Injectable 25 Gram(s) IV Push once  diVALproex  milliGRAM(s) Oral daily  ferrous    sulfate 325 milliGRAM(s) Oral daily  folic acid 1 milliGRAM(s) Oral daily  gabapentin 100 milliGRAM(s) Oral three times a day  heparin  Injectable 5000 Unit(s) SubCutaneous two times a day  insulin glargine Injectable (LANTUS) 30 Unit(s) SubCutaneous at bedtime  insulin lispro (HumaLOG) corrective regimen sliding scale   SubCutaneous three times a day before meals  insulin lispro (HumaLOG) corrective regimen sliding scale   SubCutaneous at bedtime  insulin lispro Injectable (HumaLOG) 12 Unit(s) SubCutaneous three times a day before meals  levETIRAcetam 500 milliGRAM(s) Oral two times a day  mirtazapine 30 milliGRAM(s) Oral daily  pantoprazole  Injectable 40 milliGRAM(s) IV Push two times a day  piperacillin/tazobactam IVPB.. 3.375 Gram(s) IV Intermittent every 8 hours  polyethylene glycol 3350 17 Gram(s) Oral two times a day  senna 2 Tablet(s) Oral at bedtime  tiotropium 18 MICROgram(s) Capsule 1 Capsule(s) Inhalation daily  vancomycin  IVPB 1000 milliGRAM(s) IV Intermittent every 12 hours      Physical Exam  General: Patient comfortable in bed  Vital Signs Last 12 Hrs  T(F): 98.1 (10-28-19 @ 09:15), Max: 98.6 (10-28-19 @ 00:00)  HR: 91 (10-28-19 @ 09:15) (82 - 98)  BP: 155/83 (10-28-19 @ 09:15) (108/69 - 155/83)  BP(mean): --  RR: 18 (10-28-19 @ 09:15) (18 - 18)  SpO2: 94% (10-28-19 @ 09:15) (94% - 98%)  Neck: No palpable thyroid nodules.  CVS: S1S2, No murmurs  Respiratory: No wheezing, no crepitations  GI: Abdomen soft, bowel sounds positive  Musculoskeletal:  edema lower extremities.   Skin: No skin rashes, no ecchymosis    Diagnostics

## 2019-10-28 NOTE — PROGRESS NOTE ADULT - SUBJECTIVE AND OBJECTIVE BOX
Patient is a 63y old  Male who presents with a chief complaint of Back pain (22 Oct 2019 08:44)      SUBJECTIVE / OVERNIGHT EVENTS:  No new events, Afebrile  MRI scheduled today  Review of Systems:   CONSTITUTIONAL: No fever, weight loss, or fatigue  EYES: No eye pain, visual disturbances, or discharge  ENMT:  No difficulty hearing, tinnitus, vertigo; No sinus or throat pain  NECK: No pain or stiffness  BREASTS: No pain, masses, or nipple discharge  RESPIRATORY: No cough, wheezing, chills or hemoptysis; No shortness of breath  CARDIOVASCULAR: No chest pain, palpitations, dizziness, or leg swelling  GASTROINTESTINAL: No abdominal or epigastric pain. No nausea, vomiting, or hematemesis; No diarrhea or constipation. No melena or hematochezia.  GENITOURINARY: No dysuria, frequency, hematuria, or incontinence  NEUROLOGICAL: No headaches, memory loss, loss of strength, numbness, or tremors  SKIN: No itching, burning, rashes, or lesions   LYMPH NODES: No enlarged glands  ENDOCRINE: No heat or cold intolerance; No hair loss  MUSCULOSKELETAL: No joint pain or swelling;   PSYCHIATRIC: No depression, anxiety, mood swings, or difficulty sleeping  HEME/LYMPH: No easy bruising, or bleeding gums  ALLERY AND IMMUNOLOGIC: No hives or eczema    MEDICATIONS  (STANDING):  amLODIPine   Tablet 10 milliGRAM(s) Oral daily  apixaban 5 milliGRAM(s) Oral every 12 hours  chlorhexidine 4% Liquid 1 Application(s) Topical <User Schedule>  dextrose 5%. 1000 milliLiter(s) (50 mL/Hr) IV Continuous <Continuous>  dextrose 50% Injectable 12.5 Gram(s) IV Push once  dextrose 50% Injectable 25 Gram(s) IV Push once  dextrose 50% Injectable 25 Gram(s) IV Push once  diVALproex  milliGRAM(s) Oral daily  ferrous    sulfate 325 milliGRAM(s) Oral daily  folic acid 1 milliGRAM(s) Oral daily  gabapentin 100 milliGRAM(s) Oral three times a day  insulin glargine Injectable (LANTUS) 26 Unit(s) SubCutaneous at bedtime  insulin lispro (HumaLOG) corrective regimen sliding scale   SubCutaneous three times a day before meals  insulin lispro (HumaLOG) corrective regimen sliding scale   SubCutaneous at bedtime  insulin lispro Injectable (HumaLOG) 10 Unit(s) SubCutaneous three times a day before meals  levETIRAcetam 500 milliGRAM(s) Oral two times a day  mirtazapine 30 milliGRAM(s) Oral daily  piperacillin/tazobactam IVPB.. 3.375 Gram(s) IV Intermittent every 8 hours  tiotropium 18 MICROgram(s) Capsule 1 Capsule(s) Inhalation daily  vancomycin  IVPB 1000 milliGRAM(s) IV Intermittent every 12 hours    MEDICATIONS  (PRN):  acetaminophen   Tablet .. 325 milliGRAM(s) Oral daily PRN Temp greater or equal to 38C (100.4F), Mild Pain (1 - 3)  ALBUTerol    90 MICROgram(s) HFA Inhaler 2 Puff(s) Inhalation every 6 hours PRN Shortness of Breath and/or Wheezing  dextrose 40% Gel 15 Gram(s) Oral once PRN Blood Glucose LESS THAN 70 milliGRAM(s)/deciliter  glucagon  Injectable 1 milliGRAM(s) IntraMuscular once PRN Glucose LESS THAN 70 milligrams/deciliter  HYDROmorphone  Injectable 1 milliGRAM(s) IV Push every 4 hours PRN Severe Pain (7 - 10)  oxyCODONE    5 mG/acetaminophen 325 mG 2 Tablet(s) Oral every 4 hours PRN Moderate Pain (4 - 6)  sodium chloride 0.9% lock flush 10 milliLiter(s) IV Push every 1 hour PRN Pre/post blood products, medications, blood draw, and to maintain line patency      PHYSICAL EXAM:  Vital Signs Last 24 Hrs  T(C): 36.8 (22 Oct 2019 09:12), Max: 37.3 (21 Oct 2019 15:24)  T(F): 98.2 (22 Oct 2019 09:12), Max: 99.1 (21 Oct 2019 15:24)  HR: 89 (22 Oct 2019 09:12) (88 - 99)  BP: 162/87 (22 Oct 2019 09:12) (144/74 - 162/87)  BP(mean): --  RR: 18 (22 Oct 2019 09:12) (18 - 18)  SpO2: 98% (22 Oct 2019 09:12) (96% - 98%)  I&O's Summary    21 Oct 2019 07:01  -  22 Oct 2019 07:00  --------------------------------------------------------  IN: 720 mL / OUT: 650 mL / NET: 70 mL    22 Oct 2019 07:01  -  22 Oct 2019 12:52  --------------------------------------------------------  IN: 600 mL / OUT: 450 mL / NET: 150 mL      GENERAL: NAD, well-developed  HEAD:  Atraumatic, Normocephalic  EYES: EOMI, PERRLA, conjunctiva and sclera clear  NECK: Supple, No JVD  CHEST/LUNG: Clear to auscultation bilaterally; No wheeze  HEART: Regular rate and rhythm; No murmurs, rubs, or gallops  ABDOMEN: Soft, Nontender, Nondistended; Bowel sounds present  EXTREMITIES:  elda TMA  PSYCH: AAOx3  NEUROLOGY: non-focal  SKIN: No rashes or lesions    LABS:  CAPILLARY BLOOD GLUCOSE      POCT Blood Glucose.: 157 mg/dL (22 Oct 2019 11:59)  POCT Blood Glucose.: 280 mg/dL (22 Oct 2019 08:57)  POCT Blood Glucose.: 299 mg/dL (21 Oct 2019 22:05)  POCT Blood Glucose.: 260 mg/dL (21 Oct 2019 17:11)                          8.3    9.26  )-----------( 401      ( 21 Oct 2019 09:40 )             26.8     10-21    135  |  100  |  11  ----------------------------<  184<H>  3.9   |  25  |  1.16    Ca    9.2      21 Oct 2019 09:40    TPro  8.9<H>  /  Alb  3.2<L>  /  TBili  0.3  /  DBili  x   /  AST  13  /  ALT  7<L>  /  AlkPhos  66  10-21    PT/INR - ( 21 Oct 2019 09:40 )   PT: 12.6 sec;   INR: 1.09 ratio         PTT - ( 21 Oct 2019 09:40 )  PTT:30.3 sec          RADIOLOGY & ADDITIONAL TESTS:    Imaging Personally Reviewed:    Consultant(s) Notes Reviewed:      Care Discussed with Consultants/Other Providers:

## 2019-10-28 NOTE — PROGRESS NOTE ADULT - ASSESSMENT
Assessment  DMT2: 63y Male with DM T2 with hyperglycemia was on oral meds at home, now on insulin,  FS in acceptable range, no hypoglycemic episode, eating meals, still c/o pain.  LE Abscess: on IV ABx, afebrile, stable, monitored.  HTN: Controlled,  on antihypertensive medications.  GIB: s/p egd/colonoscopy + gastritis, FU GI        Jose Alatorre MD  Cell: [phone]  Office: [phone] Assessment  DMT2: 63y Male with DM T2 with hyperglycemia was on oral meds at home, now on insulin,  FS in acceptable range, no hypoglycemic episode, eating meals,  still c/o pain.  LE Abscess: on IV ABx, afebrile, stable, monitored.  HTN: Controlled,  on antihypertensive medications.  GIB: s/p egd/colonoscopy + gastritis, FU GI        Jose Alatorre MD  Cell: [phone]  Office: [phone]

## 2019-10-28 NOTE — PROGRESS NOTE ADULT - SUBJECTIVE AND OBJECTIVE BOX
63y old  Male who presents with a chief complaint of Back pain (28 Oct 2019 11:41)      Interval history:  Afebrile, complains of back pain. Diarrhea after laxative use. No abdominal pain.     Allergy Status Unknown      Antimicrobials:  piperacillin/tazobactam IVPB.. 3.375 Gram(s) IV Intermittent every 8 hours  vancomycin  IVPB 1000 milliGRAM(s) IV Intermittent every 12 hours      REVIEW OF SYSTEMS:  No chest pain   No cough, no SOB  No N/V  No dysuria  No rash.       Vital Signs Last 24 Hrs  T(C): 36.7 (10-28-19 @ 09:15), Max: 37 (10-28-19 @ 00:00)  T(F): 98.1 (10-28-19 @ 09:15), Max: 98.6 (10-28-19 @ 00:00)  HR: 91 (10-28-19 @ 09:15) (82 - 98)  BP: 155/83 (10-28-19 @ 09:15) (108/69 - 155/83)  BP(mean): --  RR: 18 (10-28-19 @ 09:15) (18 - 18)  SpO2: 94% (10-28-19 @ 09:15) (94% - 98%)      PHYSICAL EXAM:  Patient in no acute distress. Alert, awake. Laying in bed.   No icterus, no oral ulcers. Scratch marks b/l on face.   Cardiovascular: S1S2 normal.  Lungs: + air entry B/L lung fields.  Gastrointestinal: soft, nontender, nondistended.  Extremities: no edema.  + PICC   b/l foot ulcers                            9.2    7.95  )-----------( 240      ( 28 Oct 2019 07:46 )             29.1   10-28    138  |  101  |  12  ----------------------------<  115<H>  3.9   |  26  |  1.06    Ca    9.7      28 Oct 2019 06:27        Radiology:  < from: MR Abdomen No Cont (10.25.19 @ 23:21) >  IMPRESSION:    Mildly dilated common bile duct without stones or lesions.    Right psoas abscess as noted on the prior CT of 10/21/2019.

## 2019-10-29 LAB
ANION GAP SERPL CALC-SCNC: 19 MMOL/L — HIGH (ref 5–17)
BUN SERPL-MCNC: 12 MG/DL — SIGNIFICANT CHANGE UP (ref 7–23)
CALCIUM SERPL-MCNC: 9.6 MG/DL — SIGNIFICANT CHANGE UP (ref 8.4–10.5)
CHLORIDE SERPL-SCNC: 94 MMOL/L — LOW (ref 96–108)
CO2 SERPL-SCNC: 25 MMOL/L — SIGNIFICANT CHANGE UP (ref 22–31)
CREAT SERPL-MCNC: 0.98 MG/DL — SIGNIFICANT CHANGE UP (ref 0.5–1.3)
CRP SERPL-MCNC: 1.9 MG/DL — HIGH (ref 0–0.4)
ERYTHROCYTE [SEDIMENTATION RATE] IN BLOOD: 96 MM/HR — HIGH (ref 0–20)
GLUCOSE BLDC GLUCOMTR-MCNC: 125 MG/DL — HIGH (ref 70–99)
GLUCOSE BLDC GLUCOMTR-MCNC: 129 MG/DL — HIGH (ref 70–99)
GLUCOSE BLDC GLUCOMTR-MCNC: 176 MG/DL — HIGH (ref 70–99)
GLUCOSE BLDC GLUCOMTR-MCNC: 192 MG/DL — HIGH (ref 70–99)
GLUCOSE BLDC GLUCOMTR-MCNC: 220 MG/DL — HIGH (ref 70–99)
GLUCOSE SERPL-MCNC: 126 MG/DL — HIGH (ref 70–99)
HCT VFR BLD CALC: 37.1 % — LOW (ref 39–50)
HGB BLD-MCNC: 11.6 G/DL — LOW (ref 13–17)
MCHC RBC-ENTMCNC: 27.6 PG — SIGNIFICANT CHANGE UP (ref 27–34)
MCHC RBC-ENTMCNC: 31.3 GM/DL — LOW (ref 32–36)
MCV RBC AUTO: 88.3 FL — SIGNIFICANT CHANGE UP (ref 80–100)
PLATELET # BLD AUTO: 168 K/UL — SIGNIFICANT CHANGE UP (ref 150–400)
POTASSIUM SERPL-MCNC: 4 MMOL/L — SIGNIFICANT CHANGE UP (ref 3.5–5.3)
POTASSIUM SERPL-SCNC: 4 MMOL/L — SIGNIFICANT CHANGE UP (ref 3.5–5.3)
RBC # BLD: 4.2 M/UL — SIGNIFICANT CHANGE UP (ref 4.2–5.8)
RBC # FLD: 14.8 % — HIGH (ref 10.3–14.5)
SODIUM SERPL-SCNC: 138 MMOL/L — SIGNIFICANT CHANGE UP (ref 135–145)
WBC # BLD: 4.85 K/UL — SIGNIFICANT CHANGE UP (ref 3.8–10.5)
WBC # FLD AUTO: 4.85 K/UL — SIGNIFICANT CHANGE UP (ref 3.8–10.5)

## 2019-10-29 PROCEDURE — 99233 SBSQ HOSP IP/OBS HIGH 50: CPT

## 2019-10-29 RX ADMIN — HYDROMORPHONE HYDROCHLORIDE 1 MILLIGRAM(S): 2 INJECTION INTRAMUSCULAR; INTRAVENOUS; SUBCUTANEOUS at 03:31

## 2019-10-29 RX ADMIN — OXYCODONE AND ACETAMINOPHEN 2 TABLET(S): 5; 325 TABLET ORAL at 20:33

## 2019-10-29 RX ADMIN — INSULIN GLARGINE 30 UNIT(S): 100 INJECTION, SOLUTION SUBCUTANEOUS at 21:58

## 2019-10-29 RX ADMIN — Medication 250 MILLIGRAM(S): at 13:17

## 2019-10-29 RX ADMIN — OXYCODONE AND ACETAMINOPHEN 2 TABLET(S): 5; 325 TABLET ORAL at 14:51

## 2019-10-29 RX ADMIN — HYDROMORPHONE HYDROCHLORIDE 1 MILLIGRAM(S): 2 INJECTION INTRAMUSCULAR; INTRAVENOUS; SUBCUTANEOUS at 23:30

## 2019-10-29 RX ADMIN — HYDROMORPHONE HYDROCHLORIDE 1 MILLIGRAM(S): 2 INJECTION INTRAMUSCULAR; INTRAVENOUS; SUBCUTANEOUS at 17:44

## 2019-10-29 RX ADMIN — HYDROMORPHONE HYDROCHLORIDE 1 MILLIGRAM(S): 2 INJECTION INTRAMUSCULAR; INTRAVENOUS; SUBCUTANEOUS at 07:36

## 2019-10-29 RX ADMIN — POLYETHYLENE GLYCOL 3350 17 GRAM(S): 17 POWDER, FOR SOLUTION ORAL at 17:26

## 2019-10-29 RX ADMIN — PIPERACILLIN AND TAZOBACTAM 25 GRAM(S): 4; .5 INJECTION, POWDER, LYOPHILIZED, FOR SOLUTION INTRAVENOUS at 21:58

## 2019-10-29 RX ADMIN — GABAPENTIN 100 MILLIGRAM(S): 400 CAPSULE ORAL at 13:17

## 2019-10-29 RX ADMIN — PANTOPRAZOLE SODIUM 40 MILLIGRAM(S): 20 TABLET, DELAYED RELEASE ORAL at 17:26

## 2019-10-29 RX ADMIN — HEPARIN SODIUM 5000 UNIT(S): 5000 INJECTION INTRAVENOUS; SUBCUTANEOUS at 17:26

## 2019-10-29 RX ADMIN — HYDROMORPHONE HYDROCHLORIDE 1 MILLIGRAM(S): 2 INJECTION INTRAMUSCULAR; INTRAVENOUS; SUBCUTANEOUS at 04:00

## 2019-10-29 RX ADMIN — Medication 12 UNIT(S): at 17:44

## 2019-10-29 RX ADMIN — Medication 1 MILLIGRAM(S): at 13:17

## 2019-10-29 RX ADMIN — OXYCODONE AND ACETAMINOPHEN 2 TABLET(S): 5; 325 TABLET ORAL at 06:00

## 2019-10-29 RX ADMIN — PIPERACILLIN AND TAZOBACTAM 25 GRAM(S): 4; .5 INJECTION, POWDER, LYOPHILIZED, FOR SOLUTION INTRAVENOUS at 14:33

## 2019-10-29 RX ADMIN — AMLODIPINE BESYLATE 10 MILLIGRAM(S): 2.5 TABLET ORAL at 05:24

## 2019-10-29 RX ADMIN — Medication 250 MILLIGRAM(S): at 02:36

## 2019-10-29 RX ADMIN — Medication 325 MILLIGRAM(S): at 13:17

## 2019-10-29 RX ADMIN — HYDROMORPHONE HYDROCHLORIDE 1 MILLIGRAM(S): 2 INJECTION INTRAMUSCULAR; INTRAVENOUS; SUBCUTANEOUS at 12:10

## 2019-10-29 RX ADMIN — Medication 250 MILLIGRAM(S): at 23:30

## 2019-10-29 RX ADMIN — CHLORHEXIDINE GLUCONATE 1 APPLICATION(S): 213 SOLUTION TOPICAL at 05:26

## 2019-10-29 RX ADMIN — SENNA PLUS 2 TABLET(S): 8.6 TABLET ORAL at 21:59

## 2019-10-29 RX ADMIN — OXYCODONE AND ACETAMINOPHEN 2 TABLET(S): 5; 325 TABLET ORAL at 21:36

## 2019-10-29 RX ADMIN — OXYCODONE AND ACETAMINOPHEN 2 TABLET(S): 5; 325 TABLET ORAL at 01:10

## 2019-10-29 RX ADMIN — LEVETIRACETAM 500 MILLIGRAM(S): 250 TABLET, FILM COATED ORAL at 05:24

## 2019-10-29 RX ADMIN — OXYCODONE AND ACETAMINOPHEN 2 TABLET(S): 5; 325 TABLET ORAL at 10:19

## 2019-10-29 RX ADMIN — Medication 12 UNIT(S): at 13:18

## 2019-10-29 RX ADMIN — HYDROMORPHONE HYDROCHLORIDE 1 MILLIGRAM(S): 2 INJECTION INTRAMUSCULAR; INTRAVENOUS; SUBCUTANEOUS at 11:46

## 2019-10-29 RX ADMIN — OXYCODONE AND ACETAMINOPHEN 2 TABLET(S): 5; 325 TABLET ORAL at 11:20

## 2019-10-29 RX ADMIN — OXYCODONE AND ACETAMINOPHEN 2 TABLET(S): 5; 325 TABLET ORAL at 15:40

## 2019-10-29 RX ADMIN — GABAPENTIN 100 MILLIGRAM(S): 400 CAPSULE ORAL at 05:24

## 2019-10-29 RX ADMIN — HYDROMORPHONE HYDROCHLORIDE 1 MILLIGRAM(S): 2 INJECTION INTRAMUSCULAR; INTRAVENOUS; SUBCUTANEOUS at 08:04

## 2019-10-29 RX ADMIN — DIVALPROEX SODIUM 500 MILLIGRAM(S): 500 TABLET, DELAYED RELEASE ORAL at 13:17

## 2019-10-29 RX ADMIN — GABAPENTIN 100 MILLIGRAM(S): 400 CAPSULE ORAL at 21:58

## 2019-10-29 RX ADMIN — Medication 1: at 13:18

## 2019-10-29 RX ADMIN — OXYCODONE AND ACETAMINOPHEN 2 TABLET(S): 5; 325 TABLET ORAL at 00:46

## 2019-10-29 RX ADMIN — OXYCODONE AND ACETAMINOPHEN 2 TABLET(S): 5; 325 TABLET ORAL at 05:23

## 2019-10-29 RX ADMIN — PIPERACILLIN AND TAZOBACTAM 25 GRAM(S): 4; .5 INJECTION, POWDER, LYOPHILIZED, FOR SOLUTION INTRAVENOUS at 05:26

## 2019-10-29 RX ADMIN — HYDROMORPHONE HYDROCHLORIDE 1 MILLIGRAM(S): 2 INJECTION INTRAMUSCULAR; INTRAVENOUS; SUBCUTANEOUS at 17:26

## 2019-10-29 RX ADMIN — MIRTAZAPINE 30 MILLIGRAM(S): 45 TABLET, ORALLY DISINTEGRATING ORAL at 21:58

## 2019-10-29 RX ADMIN — HYDROMORPHONE HYDROCHLORIDE 1 MILLIGRAM(S): 2 INJECTION INTRAMUSCULAR; INTRAVENOUS; SUBCUTANEOUS at 21:59

## 2019-10-29 RX ADMIN — TIOTROPIUM BROMIDE 1 CAPSULE(S): 18 CAPSULE ORAL; RESPIRATORY (INHALATION) at 13:17

## 2019-10-29 RX ADMIN — LEVETIRACETAM 500 MILLIGRAM(S): 250 TABLET, FILM COATED ORAL at 17:26

## 2019-10-29 RX ADMIN — PANTOPRAZOLE SODIUM 40 MILLIGRAM(S): 20 TABLET, DELAYED RELEASE ORAL at 05:25

## 2019-10-29 RX ADMIN — Medication 12 UNIT(S): at 08:42

## 2019-10-29 NOTE — PROGRESS NOTE ADULT - SUBJECTIVE AND OBJECTIVE BOX
Patient is a 63y old  Male who presents with a chief complaint of Back pain (22 Oct 2019 08:44)      SUBJECTIVE / OVERNIGHT EVENTS:  No new events, Afebrile  MRI done yesterday  Review of Systems:   CONSTITUTIONAL: No fever, weight loss, or fatigue  EYES: No eye pain, visual disturbances, or discharge  ENMT:  No difficulty hearing, tinnitus, vertigo; No sinus or throat pain  NECK: No pain or stiffness  BREASTS: No pain, masses, or nipple discharge  RESPIRATORY: No cough, wheezing, chills or hemoptysis; No shortness of breath  CARDIOVASCULAR: No chest pain, palpitations, dizziness, or leg swelling  GASTROINTESTINAL: No abdominal or epigastric pain. No nausea, vomiting, or hematemesis; No diarrhea or constipation. No melena or hematochezia.  GENITOURINARY: No dysuria, frequency, hematuria, or incontinence  NEUROLOGICAL: No headaches, memory loss, loss of strength, numbness, or tremors  SKIN: No itching, burning, rashes, or lesions   LYMPH NODES: No enlarged glands  ENDOCRINE: No heat or cold intolerance; No hair loss  MUSCULOSKELETAL: No joint pain or swelling;   PSYCHIATRIC: No depression, anxiety, mood swings, or difficulty sleeping  HEME/LYMPH: No easy bruising, or bleeding gums  ALLERY AND IMMUNOLOGIC: No hives or eczema    MEDICATIONS  (STANDING):  amLODIPine   Tablet 10 milliGRAM(s) Oral daily  apixaban 5 milliGRAM(s) Oral every 12 hours  chlorhexidine 4% Liquid 1 Application(s) Topical <User Schedule>  dextrose 5%. 1000 milliLiter(s) (50 mL/Hr) IV Continuous <Continuous>  dextrose 50% Injectable 12.5 Gram(s) IV Push once  dextrose 50% Injectable 25 Gram(s) IV Push once  dextrose 50% Injectable 25 Gram(s) IV Push once  diVALproex  milliGRAM(s) Oral daily  ferrous    sulfate 325 milliGRAM(s) Oral daily  folic acid 1 milliGRAM(s) Oral daily  gabapentin 100 milliGRAM(s) Oral three times a day  insulin glargine Injectable (LANTUS) 26 Unit(s) SubCutaneous at bedtime  insulin lispro (HumaLOG) corrective regimen sliding scale   SubCutaneous three times a day before meals  insulin lispro (HumaLOG) corrective regimen sliding scale   SubCutaneous at bedtime  insulin lispro Injectable (HumaLOG) 10 Unit(s) SubCutaneous three times a day before meals  levETIRAcetam 500 milliGRAM(s) Oral two times a day  mirtazapine 30 milliGRAM(s) Oral daily  piperacillin/tazobactam IVPB.. 3.375 Gram(s) IV Intermittent every 8 hours  tiotropium 18 MICROgram(s) Capsule 1 Capsule(s) Inhalation daily  vancomycin  IVPB 1000 milliGRAM(s) IV Intermittent every 12 hours    MEDICATIONS  (PRN):  acetaminophen   Tablet .. 325 milliGRAM(s) Oral daily PRN Temp greater or equal to 38C (100.4F), Mild Pain (1 - 3)  ALBUTerol    90 MICROgram(s) HFA Inhaler 2 Puff(s) Inhalation every 6 hours PRN Shortness of Breath and/or Wheezing  dextrose 40% Gel 15 Gram(s) Oral once PRN Blood Glucose LESS THAN 70 milliGRAM(s)/deciliter  glucagon  Injectable 1 milliGRAM(s) IntraMuscular once PRN Glucose LESS THAN 70 milligrams/deciliter  HYDROmorphone  Injectable 1 milliGRAM(s) IV Push every 4 hours PRN Severe Pain (7 - 10)  oxyCODONE    5 mG/acetaminophen 325 mG 2 Tablet(s) Oral every 4 hours PRN Moderate Pain (4 - 6)  sodium chloride 0.9% lock flush 10 milliLiter(s) IV Push every 1 hour PRN Pre/post blood products, medications, blood draw, and to maintain line patency      PHYSICAL EXAM:  Vital Signs Last 24 Hrs  T(C): 36.8 (22 Oct 2019 09:12), Max: 37.3 (21 Oct 2019 15:24)  T(F): 98.2 (22 Oct 2019 09:12), Max: 99.1 (21 Oct 2019 15:24)  HR: 89 (22 Oct 2019 09:12) (88 - 99)  BP: 162/87 (22 Oct 2019 09:12) (144/74 - 162/87)  BP(mean): --  RR: 18 (22 Oct 2019 09:12) (18 - 18)  SpO2: 98% (22 Oct 2019 09:12) (96% - 98%)  I&O's Summary    21 Oct 2019 07:01  -  22 Oct 2019 07:00  --------------------------------------------------------  IN: 720 mL / OUT: 650 mL / NET: 70 mL    22 Oct 2019 07:01  -  22 Oct 2019 12:52  --------------------------------------------------------  IN: 600 mL / OUT: 450 mL / NET: 150 mL      GENERAL: NAD, well-developed  HEAD:  Atraumatic, Normocephalic  EYES: EOMI, PERRLA, conjunctiva and sclera clear  NECK: Supple, No JVD  CHEST/LUNG: Clear to auscultation bilaterally; No wheeze  HEART: Regular rate and rhythm; No murmurs, rubs, or gallops  ABDOMEN: Soft, Nontender, Nondistended; Bowel sounds present  EXTREMITIES:  elda TMA  PSYCH: AAOx3  NEUROLOGY: non-focal  SKIN: No rashes or lesions    LABS:  CAPILLARY BLOOD GLUCOSE      POCT Blood Glucose.: 157 mg/dL (22 Oct 2019 11:59)  POCT Blood Glucose.: 280 mg/dL (22 Oct 2019 08:57)  POCT Blood Glucose.: 299 mg/dL (21 Oct 2019 22:05)  POCT Blood Glucose.: 260 mg/dL (21 Oct 2019 17:11)                          8.3    9.26  )-----------( 401      ( 21 Oct 2019 09:40 )             26.8     10-21    135  |  100  |  11  ----------------------------<  184<H>  3.9   |  25  |  1.16    Ca    9.2      21 Oct 2019 09:40    TPro  8.9<H>  /  Alb  3.2<L>  /  TBili  0.3  /  DBili  x   /  AST  13  /  ALT  7<L>  /  AlkPhos  66  10-21    PT/INR - ( 21 Oct 2019 09:40 )   PT: 12.6 sec;   INR: 1.09 ratio         PTT - ( 21 Oct 2019 09:40 )  PTT:30.3 sec          RADIOLOGY & ADDITIONAL TESTS:    Imaging Personally Reviewed:    Consultant(s) Notes Reviewed:      Care Discussed with Consultants/Other Providers:

## 2019-10-29 NOTE — PROGRESS NOTE ADULT - PROBLEM SELECTOR PLAN 1
ID FU noted. Cont IV antibiotics. L1 appears neoplastic?  Spine surgery to opine on further management

## 2019-10-29 NOTE — PROGRESS NOTE ADULT - SUBJECTIVE AND OBJECTIVE BOX
Chief complaint  Patient is a 63y old  Male who presents with a chief complaint of Back pain (29 Oct 2019 08:54)   Review of systems  Patient in bed, looks comfortable, no fever, no hypoglycemia.    Labs and Fingersticks  CAPILLARY BLOOD GLUCOSE      POCT Blood Glucose.: 192 mg/dL (29 Oct 2019 13:13)  POCT Blood Glucose.: 220 mg/dL (29 Oct 2019 12:03)  POCT Blood Glucose.: 129 mg/dL (29 Oct 2019 08:14)  POCT Blood Glucose.: 222 mg/dL (28 Oct 2019 21:48)  POCT Blood Glucose.: 135 mg/dL (28 Oct 2019 18:39)      Anion Gap, Serum: 19 <H> (10-29 @ 06:40)  Anion Gap, Serum: 11 (10-28 @ 06:27)      Calcium, Total Serum: 9.6 (10-29 @ 06:40)  Calcium, Total Serum: 9.7 (10-28 @ 06:27)          10-29    138  |  94<L>  |  12  ----------------------------<  126<H>  4.0   |  25  |  0.98    Ca    9.6      29 Oct 2019 06:40                          11.6   4.85  )-----------( 168      ( 29 Oct 2019 08:12 )             37.1     Medications  MEDICATIONS  (STANDING):  amLODIPine   Tablet 10 milliGRAM(s) Oral daily  chlorhexidine 4% Liquid 1 Application(s) Topical <User Schedule>  dextrose 5%. 1000 milliLiter(s) (50 mL/Hr) IV Continuous <Continuous>  dextrose 50% Injectable 12.5 Gram(s) IV Push once  dextrose 50% Injectable 25 Gram(s) IV Push once  dextrose 50% Injectable 25 Gram(s) IV Push once  diVALproex  milliGRAM(s) Oral daily  ferrous    sulfate 325 milliGRAM(s) Oral daily  folic acid 1 milliGRAM(s) Oral daily  gabapentin 100 milliGRAM(s) Oral three times a day  heparin  Injectable 5000 Unit(s) SubCutaneous two times a day  insulin glargine Injectable (LANTUS) 30 Unit(s) SubCutaneous at bedtime  insulin lispro (HumaLOG) corrective regimen sliding scale   SubCutaneous three times a day before meals  insulin lispro (HumaLOG) corrective regimen sliding scale   SubCutaneous at bedtime  insulin lispro Injectable (HumaLOG) 12 Unit(s) SubCutaneous three times a day before meals  levETIRAcetam 500 milliGRAM(s) Oral two times a day  mirtazapine 30 milliGRAM(s) Oral daily  pantoprazole  Injectable 40 milliGRAM(s) IV Push two times a day  piperacillin/tazobactam IVPB.. 3.375 Gram(s) IV Intermittent every 8 hours  polyethylene glycol 3350 17 Gram(s) Oral two times a day  senna 2 Tablet(s) Oral at bedtime  tiotropium 18 MICROgram(s) Capsule 1 Capsule(s) Inhalation daily  vancomycin  IVPB 1000 milliGRAM(s) IV Intermittent every 12 hours      Physical Exam  General: Patient comfortable in bed  Vital Signs Last 12 Hrs  T(F): 97.8 (10-29-19 @ 07:53), Max: 97.8 (10-29-19 @ 05:17)  HR: 81 (10-29-19 @ 07:53) (75 - 81)  BP: 148/81 (10-29-19 @ 07:53) (144/82 - 148/81)  BP(mean): --  RR: 18 (10-29-19 @ 07:53) (17 - 18)  SpO2: 98% (10-29-19 @ 07:53) (98% - 98%)  Neck: No palpable thyroid nodules.  CVS: S1S2, No murmurs  Respiratory: No wheezing, no crepitations  GI: Abdomen soft, bowel sounds positive  Musculoskeletal:  edema lower extremities.   Skin: No skin rashes, no ecchymosis    Diagnostics Chief complaint  Patient is a 63y old  Male who presents with a chief complaint of Back pain (29 Oct 2019 08:54)   Review of systems  Patient in bed, looks comfortable, no fever,  no hypoglycemia.    Labs and Fingersticks  CAPILLARY BLOOD GLUCOSE      POCT Blood Glucose.: 192 mg/dL (29 Oct 2019 13:13)  POCT Blood Glucose.: 220 mg/dL (29 Oct 2019 12:03)  POCT Blood Glucose.: 129 mg/dL (29 Oct 2019 08:14)  POCT Blood Glucose.: 222 mg/dL (28 Oct 2019 21:48)  POCT Blood Glucose.: 135 mg/dL (28 Oct 2019 18:39)      Anion Gap, Serum: 19 <H> (10-29 @ 06:40)  Anion Gap, Serum: 11 (10-28 @ 06:27)      Calcium, Total Serum: 9.6 (10-29 @ 06:40)  Calcium, Total Serum: 9.7 (10-28 @ 06:27)          10-29    138  |  94<L>  |  12  ----------------------------<  126<H>  4.0   |  25  |  0.98    Ca    9.6      29 Oct 2019 06:40                          11.6   4.85  )-----------( 168      ( 29 Oct 2019 08:12 )             37.1     Medications  MEDICATIONS  (STANDING):  amLODIPine   Tablet 10 milliGRAM(s) Oral daily  chlorhexidine 4% Liquid 1 Application(s) Topical <User Schedule>  dextrose 5%. 1000 milliLiter(s) (50 mL/Hr) IV Continuous <Continuous>  dextrose 50% Injectable 12.5 Gram(s) IV Push once  dextrose 50% Injectable 25 Gram(s) IV Push once  dextrose 50% Injectable 25 Gram(s) IV Push once  diVALproex  milliGRAM(s) Oral daily  ferrous    sulfate 325 milliGRAM(s) Oral daily  folic acid 1 milliGRAM(s) Oral daily  gabapentin 100 milliGRAM(s) Oral three times a day  heparin  Injectable 5000 Unit(s) SubCutaneous two times a day  insulin glargine Injectable (LANTUS) 30 Unit(s) SubCutaneous at bedtime  insulin lispro (HumaLOG) corrective regimen sliding scale   SubCutaneous three times a day before meals  insulin lispro (HumaLOG) corrective regimen sliding scale   SubCutaneous at bedtime  insulin lispro Injectable (HumaLOG) 12 Unit(s) SubCutaneous three times a day before meals  levETIRAcetam 500 milliGRAM(s) Oral two times a day  mirtazapine 30 milliGRAM(s) Oral daily  pantoprazole  Injectable 40 milliGRAM(s) IV Push two times a day  piperacillin/tazobactam IVPB.. 3.375 Gram(s) IV Intermittent every 8 hours  polyethylene glycol 3350 17 Gram(s) Oral two times a day  senna 2 Tablet(s) Oral at bedtime  tiotropium 18 MICROgram(s) Capsule 1 Capsule(s) Inhalation daily  vancomycin  IVPB 1000 milliGRAM(s) IV Intermittent every 12 hours      Physical Exam  General: Patient comfortable in bed  Vital Signs Last 12 Hrs  T(F): 97.8 (10-29-19 @ 07:53), Max: 97.8 (10-29-19 @ 05:17)  HR: 81 (10-29-19 @ 07:53) (75 - 81)  BP: 148/81 (10-29-19 @ 07:53) (144/82 - 148/81)  BP(mean): --  RR: 18 (10-29-19 @ 07:53) (17 - 18)  SpO2: 98% (10-29-19 @ 07:53) (98% - 98%)  Neck: No palpable thyroid nodules.  CVS: S1S2, No murmurs  Respiratory: No wheezing, no crepitations  GI: Abdomen soft, bowel sounds positive  Musculoskeletal:  edema lower extremities.   Skin: No skin rashes, no ecchymosis    Diagnostics

## 2019-10-29 NOTE — PROGRESS NOTE ADULT - ASSESSMENT
63M with bilateral foot ulcerations, chronic   -Pt seen and evaluated   -Chronic ulcerations of bilateral feet both probing to bone but no acute signs of infection   -Pt has had multiple operations for his feet and states is currently on a PICC line - unclear exactly when it was placed. States has been going to wound care center at [place] and wounds have been slowly improving   -Recommend obtaining records from [place] to show what pt was PICCd on / duration   -Left foot XR shows chronic osteo of the 3rd met head; Right foot XR shows possible osteo of the talus   -Pt uninterested in any further amputation at this time, would like to continue local wound care and abx regimen  -No plan for podiatric surgical intervention   -Continue with santyl + DSD to b/l feet daily  -Recommend Z flows to be worn at all times in bed - pt admits has been non compliant with wearing them   -Seen w/ attending

## 2019-10-29 NOTE — PROGRESS NOTE ADULT - SUBJECTIVE AND OBJECTIVE BOX
Patient is a 63y old  Male who presents with a chief complaint of Back pain (28 Oct 2019 19:26)       INTERVAL HPI/OVERNIGHT EVENTS:  Patient seen and evaluated at bedside.  Pt is resting comfortable in NAD. Denies N/V/F/C.      Allergies    Allergy Status Unknown    Intolerances        Vital Signs Last 24 Hrs  T(C): 36.6 (29 Oct 2019 07:53), Max: 36.9 (28 Oct 2019 20:43)  T(F): 97.8 (29 Oct 2019 07:53), Max: 98.5 (28 Oct 2019 20:43)  HR: 81 (29 Oct 2019 07:53) (75 - 97)  BP: 148/81 (29 Oct 2019 07:53) (144/82 - 155/83)  BP(mean): --  RR: 18 (29 Oct 2019 07:53) (17 - 18)  SpO2: 98% (29 Oct 2019 07:53) (94% - 98%)    LABS:                        11.6   4.85  )-----------( 168      ( 29 Oct 2019 08:12 )             37.1     10-29    138  |  94<L>  |  12  ----------------------------<  126<H>  4.0   |  25  |  0.98    Ca    9.6      29 Oct 2019 06:40          CAPILLARY BLOOD GLUCOSE      POCT Blood Glucose.: 129 mg/dL (29 Oct 2019 08:14)  POCT Blood Glucose.: 222 mg/dL (28 Oct 2019 21:48)  POCT Blood Glucose.: 135 mg/dL (28 Oct 2019 18:39)  POCT Blood Glucose.: 169 mg/dL (28 Oct 2019 12:32)      Lower Extremity Physical Exam:  Vasular: DP/PT 2/4, B/L, non palpable DP Right foot, CFT < 3 seconds L, Temperature gradient wnl B/L.   Neuro: Epicritic sensation absent to the level of ankle B/L.  Musculoskeletal/Ortho: s/p Right foot choparts amputation and Left foot 1st toe amp  Skin: Right foot plantar Right foot stump ulceration, mostly granular tissue with track to bone, no purulence, no malodor, no cellulitis, no signs of infection   Left foot dorsal and plantar 3rd metatarsal head ulcer with fibrogranular base, probes to bone, no purulence/malodor/signs of infection  RADIOLOGY & ADDITIONAL TESTS:

## 2019-10-29 NOTE — PROGRESS NOTE ADULT - ASSESSMENT
Assessment  DMT2: 63y Male with DM T2 with hyperglycemia was on oral meds at home, now on insulin, FS fluctuating, overall acceptable range, no hypoglycemic episode, eating full meals, comfortable, pain improved.  LE Abscess: on IV ABx, afebrile, stable, monitored.  HTN: Controlled,  on antihypertensive medications.  GIB: s/p egd/colonoscopy + gastritis, FU LAKIA Alatorre MD  Cell: [phone]  Office: [phone] Assessment  DMT2: 63y Male with DM T2 with hyperglycemia was on oral meds at home, now on insulin, FS fluctuating, overall acceptable range, no hypoglycemic episode, eating full meals,  comfortable, pain improved.  LE Abscess: on IV ABx, afebrile, stable, monitored.  HTN: Controlled,  on antihypertensive medications.  GIB: s/p egd/colonoscopy + gastritis, FU LAKIA Alatorre MD  Cell: [phone]  Office: [phone]

## 2019-10-29 NOTE — PROGRESS NOTE ADULT - SUBJECTIVE AND OBJECTIVE BOX
63y old  Male who presents with a chief complaint of Back pain (29 Oct 2019 18:47)      Interval history:  Afebrile, complains of back pain, not controlled.     Allergy Status Unknown    Antimicrobials:  piperacillin/tazobactam IVPB.. 3.375 Gram(s) IV Intermittent every 8 hours  vancomycin  IVPB 1000 milliGRAM(s) IV Intermittent every 12 hours      REVIEW OF SYSTEMS:  No chest pain   No cough, no SOB  No N/V/D, no abdominal pain  No dysuria   No rash.       Vital Signs Last 24 Hrs  T(C): 36.6 (10-29-19 @ 16:19), Max: 36.9 (10-28-19 @ 20:43)  T(F): 97.9 (10-29-19 @ 16:19), Max: 98.5 (10-28-19 @ 20:43)  HR: 80 (10-29-19 @ 16:19) (75 - 97)  BP: 149/82 (10-29-19 @ 16:19) (144/82 - 149/82)  BP(mean): --  RR: 18 (10-29-19 @ 16:19) (17 - 18)  SpO2: 98% (10-29-19 @ 16:19) (98% - 98%)      PHYSICAL EXAM:  Patient in no acute distress. Alert, awake. Laying in bed.   No icterus, no oral ulcers.   Cardiovascular: S1S2 normal.  Lungs: + air entry B/L lung fields.  Gastrointestinal: soft, nontender, nondistended.  Extremities: no edema.  + PICC   b/l foot ulcers                            11.6   4.85  )-----------( 168      ( 29 Oct 2019 08:12 )             37.1   10-29    138  |  94<L>  |  12  ----------------------------<  126<H>  4.0   |  25  |  0.98    Ca    9.6      29 Oct 2019 06:40      Radiology:  < from: MR Thoracic Spine w/wo IV Cont (10.28.19 @ 19:04) >  IMPRESSION: Abnormal signal involving the L1 vertebral body with mild   superior endplate compression deformity. Minimal epidural extension of   abnormal signal. Left L3-4 soft tissue in the neural foramen with mild   thecal sac compression. Enlarged right psoas muscle with central   necrosis. Enhancing L1 vertebral body, psoas muscle and dorsal paraspinal   muscles on the left L3-L5. Differential diagnosis includes infectious   versus neoplastic. Although the CRP and ESR are elevated and therefore   favor infection, the appearance of the L1 vertebral body is more   suspicious of neoplasm.

## 2019-10-29 NOTE — PROGRESS NOTE ADULT - ASSESSMENT
62 yo male with DM, HTN, and epilepsy was transferred from [facility] for possible T12-L1 discitis / osteomyelitis following a mechanical fall 5 days ago. Currently receiving IV Vanc/Zosyn for right foot osteomyelitis. Was seen at [facility] 10/20 where a CT abd/pelvis found an enlarged psoas muscle with ? abscesses with possible extension to the T12 L1 disc with a compression fracture suspicious for osteomyelitis.      Overall foot OM, concern for psoas abscess/spinal OM vs neoplasm, elevated ESR. Uncontrolled DM.       Plan:   - MRI with contrast of Thoracolumbar spine reviewed with Neuroradiology.   - needs core biopsy, flow cytometry, bacterial, fungal and AFB cx to be performed.   - needs IR evaluation.   - f/u BCx, NTD.   - c/w Zosyn,   - C/W Vanc 1g q12.   - trough therapeutic, continue with the same dose.   - S/p Neurosurgery eval   - s/p podiatry eval.   - Positive Hep C serology, viral load almost undetectable.

## 2019-10-30 LAB
ANION GAP SERPL CALC-SCNC: 9 MMOL/L — SIGNIFICANT CHANGE UP (ref 5–17)
APTT BLD: 28.2 SEC — SIGNIFICANT CHANGE UP (ref 27.5–36.3)
BASOPHILS # BLD AUTO: 0.14 K/UL — SIGNIFICANT CHANGE UP (ref 0–0.2)
BASOPHILS NFR BLD AUTO: 1.6 % — SIGNIFICANT CHANGE UP (ref 0–2)
BUN SERPL-MCNC: 16 MG/DL — SIGNIFICANT CHANGE UP (ref 7–23)
CALCIUM SERPL-MCNC: 9.6 MG/DL — SIGNIFICANT CHANGE UP (ref 8.4–10.5)
CHLORIDE SERPL-SCNC: 101 MMOL/L — SIGNIFICANT CHANGE UP (ref 96–108)
CO2 SERPL-SCNC: 25 MMOL/L — SIGNIFICANT CHANGE UP (ref 22–31)
CREAT SERPL-MCNC: 1.04 MG/DL — SIGNIFICANT CHANGE UP (ref 0.5–1.3)
EOSINOPHIL # BLD AUTO: 0.42 K/UL — SIGNIFICANT CHANGE UP (ref 0–0.5)
EOSINOPHIL NFR BLD AUTO: 4.9 % — SIGNIFICANT CHANGE UP (ref 0–6)
GLUCOSE BLDC GLUCOMTR-MCNC: 132 MG/DL — HIGH (ref 70–99)
GLUCOSE BLDC GLUCOMTR-MCNC: 132 MG/DL — HIGH (ref 70–99)
GLUCOSE BLDC GLUCOMTR-MCNC: 134 MG/DL — HIGH (ref 70–99)
GLUCOSE BLDC GLUCOMTR-MCNC: 152 MG/DL — HIGH (ref 70–99)
GLUCOSE SERPL-MCNC: 121 MG/DL — HIGH (ref 70–99)
HCT VFR BLD CALC: 27.3 % — LOW (ref 39–50)
HGB BLD-MCNC: 8.6 G/DL — LOW (ref 13–17)
IMM GRANULOCYTES NFR BLD AUTO: 0.5 % — SIGNIFICANT CHANGE UP (ref 0–1.5)
INR BLD: 1.1 RATIO — SIGNIFICANT CHANGE UP (ref 0.88–1.16)
LYMPHOCYTES # BLD AUTO: 2.95 K/UL — SIGNIFICANT CHANGE UP (ref 1–3.3)
LYMPHOCYTES # BLD AUTO: 34.7 % — SIGNIFICANT CHANGE UP (ref 13–44)
MCHC RBC-ENTMCNC: 28.1 PG — SIGNIFICANT CHANGE UP (ref 27–34)
MCHC RBC-ENTMCNC: 31.5 GM/DL — LOW (ref 32–36)
MCV RBC AUTO: 89.2 FL — SIGNIFICANT CHANGE UP (ref 80–100)
MONOCYTES # BLD AUTO: 0.63 K/UL — SIGNIFICANT CHANGE UP (ref 0–0.9)
MONOCYTES NFR BLD AUTO: 7.4 % — SIGNIFICANT CHANGE UP (ref 2–14)
NEUTROPHILS # BLD AUTO: 4.31 K/UL — SIGNIFICANT CHANGE UP (ref 1.8–7.4)
NEUTROPHILS NFR BLD AUTO: 50.9 % — SIGNIFICANT CHANGE UP (ref 43–77)
PLATELET # BLD AUTO: 215 K/UL — SIGNIFICANT CHANGE UP (ref 150–400)
POTASSIUM SERPL-MCNC: 4.2 MMOL/L — SIGNIFICANT CHANGE UP (ref 3.5–5.3)
POTASSIUM SERPL-SCNC: 4.2 MMOL/L — SIGNIFICANT CHANGE UP (ref 3.5–5.3)
PROTHROM AB SERPL-ACNC: 12.6 SEC — SIGNIFICANT CHANGE UP (ref 10–13.1)
PSA FLD-MCNC: 0.29 NG/ML — SIGNIFICANT CHANGE UP (ref 0–4)
RBC # BLD: 3.06 M/UL — LOW (ref 4.2–5.8)
RBC # FLD: 14.8 % — HIGH (ref 10.3–14.5)
SODIUM SERPL-SCNC: 135 MMOL/L — SIGNIFICANT CHANGE UP (ref 135–145)
VANCOMYCIN TROUGH SERPL-MCNC: 9.3 UG/ML — LOW (ref 10–20)
WBC # BLD: 8.49 K/UL — SIGNIFICANT CHANGE UP (ref 3.8–10.5)
WBC # FLD AUTO: 8.49 K/UL — SIGNIFICANT CHANGE UP (ref 3.8–10.5)

## 2019-10-30 PROCEDURE — 99232 SBSQ HOSP IP/OBS MODERATE 35: CPT

## 2019-10-30 PROCEDURE — 88305 TISSUE EXAM BY PATHOLOGIST: CPT | Mod: 26

## 2019-10-30 PROCEDURE — 77012 CT SCAN FOR NEEDLE BIOPSY: CPT | Mod: 26

## 2019-10-30 PROCEDURE — 10160 PNXR ASPIR ABSC HMTMA BULLA: CPT

## 2019-10-30 RX ORDER — GABAPENTIN 400 MG/1
200 CAPSULE ORAL THREE TIMES A DAY
Refills: 0 | Status: DISCONTINUED | OUTPATIENT
Start: 2019-10-30 | End: 2019-10-31

## 2019-10-30 RX ADMIN — OXYCODONE AND ACETAMINOPHEN 2 TABLET(S): 5; 325 TABLET ORAL at 13:15

## 2019-10-30 RX ADMIN — Medication 250 MILLIGRAM(S): at 23:17

## 2019-10-30 RX ADMIN — HYDROMORPHONE HYDROCHLORIDE 1 MILLIGRAM(S): 2 INJECTION INTRAMUSCULAR; INTRAVENOUS; SUBCUTANEOUS at 10:33

## 2019-10-30 RX ADMIN — AMLODIPINE BESYLATE 10 MILLIGRAM(S): 2.5 TABLET ORAL at 05:58

## 2019-10-30 RX ADMIN — GABAPENTIN 100 MILLIGRAM(S): 400 CAPSULE ORAL at 12:05

## 2019-10-30 RX ADMIN — HYDROMORPHONE HYDROCHLORIDE 1 MILLIGRAM(S): 2 INJECTION INTRAMUSCULAR; INTRAVENOUS; SUBCUTANEOUS at 14:32

## 2019-10-30 RX ADMIN — TIOTROPIUM BROMIDE 1 CAPSULE(S): 18 CAPSULE ORAL; RESPIRATORY (INHALATION) at 12:05

## 2019-10-30 RX ADMIN — HYDROMORPHONE HYDROCHLORIDE 1 MILLIGRAM(S): 2 INJECTION INTRAMUSCULAR; INTRAVENOUS; SUBCUTANEOUS at 23:17

## 2019-10-30 RX ADMIN — GABAPENTIN 200 MILLIGRAM(S): 400 CAPSULE ORAL at 21:45

## 2019-10-30 RX ADMIN — OXYCODONE AND ACETAMINOPHEN 2 TABLET(S): 5; 325 TABLET ORAL at 08:49

## 2019-10-30 RX ADMIN — HYDROMORPHONE HYDROCHLORIDE 1 MILLIGRAM(S): 2 INJECTION INTRAMUSCULAR; INTRAVENOUS; SUBCUTANEOUS at 14:02

## 2019-10-30 RX ADMIN — GABAPENTIN 100 MILLIGRAM(S): 400 CAPSULE ORAL at 05:58

## 2019-10-30 RX ADMIN — HYDROMORPHONE HYDROCHLORIDE 1 MILLIGRAM(S): 2 INJECTION INTRAMUSCULAR; INTRAVENOUS; SUBCUTANEOUS at 18:35

## 2019-10-30 RX ADMIN — PIPERACILLIN AND TAZOBACTAM 25 GRAM(S): 4; .5 INJECTION, POWDER, LYOPHILIZED, FOR SOLUTION INTRAVENOUS at 05:58

## 2019-10-30 RX ADMIN — PIPERACILLIN AND TAZOBACTAM 25 GRAM(S): 4; .5 INJECTION, POWDER, LYOPHILIZED, FOR SOLUTION INTRAVENOUS at 21:45

## 2019-10-30 RX ADMIN — HYDROMORPHONE HYDROCHLORIDE 1 MILLIGRAM(S): 2 INJECTION INTRAMUSCULAR; INTRAVENOUS; SUBCUTANEOUS at 06:34

## 2019-10-30 RX ADMIN — OXYCODONE AND ACETAMINOPHEN 2 TABLET(S): 5; 325 TABLET ORAL at 22:17

## 2019-10-30 RX ADMIN — DIVALPROEX SODIUM 500 MILLIGRAM(S): 500 TABLET, DELAYED RELEASE ORAL at 12:06

## 2019-10-30 RX ADMIN — OXYCODONE AND ACETAMINOPHEN 2 TABLET(S): 5; 325 TABLET ORAL at 21:45

## 2019-10-30 RX ADMIN — LEVETIRACETAM 500 MILLIGRAM(S): 250 TABLET, FILM COATED ORAL at 05:58

## 2019-10-30 RX ADMIN — Medication 325 MILLIGRAM(S): at 12:05

## 2019-10-30 RX ADMIN — HYDROMORPHONE HYDROCHLORIDE 1 MILLIGRAM(S): 2 INJECTION INTRAMUSCULAR; INTRAVENOUS; SUBCUTANEOUS at 22:19

## 2019-10-30 RX ADMIN — Medication 250 MILLIGRAM(S): at 13:15

## 2019-10-30 RX ADMIN — HYDROMORPHONE HYDROCHLORIDE 1 MILLIGRAM(S): 2 INJECTION INTRAMUSCULAR; INTRAVENOUS; SUBCUTANEOUS at 02:49

## 2019-10-30 RX ADMIN — SENNA PLUS 2 TABLET(S): 8.6 TABLET ORAL at 21:45

## 2019-10-30 RX ADMIN — Medication 1 MILLIGRAM(S): at 12:05

## 2019-10-30 RX ADMIN — HYDROMORPHONE HYDROCHLORIDE 1 MILLIGRAM(S): 2 INJECTION INTRAMUSCULAR; INTRAVENOUS; SUBCUTANEOUS at 06:04

## 2019-10-30 RX ADMIN — OXYCODONE AND ACETAMINOPHEN 2 TABLET(S): 5; 325 TABLET ORAL at 05:51

## 2019-10-30 RX ADMIN — OXYCODONE AND ACETAMINOPHEN 2 TABLET(S): 5; 325 TABLET ORAL at 13:45

## 2019-10-30 RX ADMIN — OXYCODONE AND ACETAMINOPHEN 2 TABLET(S): 5; 325 TABLET ORAL at 09:19

## 2019-10-30 RX ADMIN — INSULIN GLARGINE 30 UNIT(S): 100 INJECTION, SOLUTION SUBCUTANEOUS at 22:13

## 2019-10-30 RX ADMIN — MIRTAZAPINE 30 MILLIGRAM(S): 45 TABLET, ORALLY DISINTEGRATING ORAL at 21:45

## 2019-10-30 RX ADMIN — PIPERACILLIN AND TAZOBACTAM 25 GRAM(S): 4; .5 INJECTION, POWDER, LYOPHILIZED, FOR SOLUTION INTRAVENOUS at 14:20

## 2019-10-30 RX ADMIN — CHLORHEXIDINE GLUCONATE 1 APPLICATION(S): 213 SOLUTION TOPICAL at 05:59

## 2019-10-30 RX ADMIN — HYDROMORPHONE HYDROCHLORIDE 1 MILLIGRAM(S): 2 INJECTION INTRAMUSCULAR; INTRAVENOUS; SUBCUTANEOUS at 18:50

## 2019-10-30 RX ADMIN — HYDROMORPHONE HYDROCHLORIDE 1 MILLIGRAM(S): 2 INJECTION INTRAMUSCULAR; INTRAVENOUS; SUBCUTANEOUS at 04:19

## 2019-10-30 RX ADMIN — HYDROMORPHONE HYDROCHLORIDE 1 MILLIGRAM(S): 2 INJECTION INTRAMUSCULAR; INTRAVENOUS; SUBCUTANEOUS at 10:03

## 2019-10-30 RX ADMIN — OXYCODONE AND ACETAMINOPHEN 2 TABLET(S): 5; 325 TABLET ORAL at 04:41

## 2019-10-30 NOTE — PRE-ANESTHESIA EVALUATION ADULT - NSANTHOSAYNRD_GEN_A_CORE
No. THERESA screening performed.  STOP BANG Legend: 0-2 = LOW Risk; 3-4 = INTERMEDIATE Risk; 5-8 = HIGH Risk

## 2019-10-30 NOTE — DIETITIAN INITIAL EVALUATION ADULT. - REASON INDICATOR FOR ASSESSMENT
Pt seen for initial nutrition assessment; consult for HbA1c>9.  Pt with T2DM, HTN transferred from OSH for suspicious osteomyelitis; found to have anemia secondary to blood loss; s/p EGD +gastritis; scheduled for drainage of psoas abscess.   Pt sleeping at time of visit. Information obtained from RN, comprehensive chart review.

## 2019-10-30 NOTE — DIETITIAN INITIAL EVALUATION ADULT. - ENERGY INTAKE
Per RN, pt with good po intake/appetite. Pt currently NPO for procedure, previously on DASH/TLC diet. Good (>75%)/Per nursing flowsheets, pt with % intake of meals documented.

## 2019-10-30 NOTE — DIETITIAN INITIAL EVALUATION ADULT. - PERTINENT MEDS FT
Dulcolax suppository prn, miralax, senna, protonix, folic acid, ferrous sulfate, Lantus 30 units @ bedtime, humalog corrective sliding scale, lispro 12 units 3x daily before meals as necessary.

## 2019-10-30 NOTE — CHART NOTE - NSCHARTNOTEFT_GEN_A_CORE
MRI completed and reviewed. Appears osteomyelitis. Got IR guided biopsy of adjacent joining psoas abscess. F/u results. No acute neurosurgical intervention at this time. Would recommend MRI once abx course completed or has neurological exam change. TLSO brace when out of bed or sitting

## 2019-10-30 NOTE — DIETITIAN INITIAL EVALUATION ADULT. - ADD RECOMMEND
1)When medically feasible, change diet to consistent carbohydrate with evening snack.2)Will follow up to provide diabetic nutrition education as appropriate. 3)Monitor po intake, weight, nutrition related labs, GI distress/BMs, skin integrity.

## 2019-10-30 NOTE — CHART NOTE - NSCHARTNOTEFT_GEN_A_CORE
Vascular & Interventional Radiology Post-Procedure Note    Pre-Procedure Diagnosis: Right psoas abscess/collection.  Post-Procedure Diagnosis: Same as pre.  Indications for Procedure: Right psoas abscess     Attending: Dr. Ewing  Resident: Dr. Lieberman    Procedure Details/Findings: 30cc of purulent/bloody fluid manually aspirated.     Complications: No immediate   Estimated Blood Loss: Minimal  Specimen: Sent for culture and cytology  Contrast: None   Sedation: Monitored sedation  Patient Condition/Disposition: To recovery area and back to floor     Plan:   - monitor site for bleeding  - follow up culture and cytology results  - vitals as ordered

## 2019-10-30 NOTE — PROGRESS NOTE ADULT - SUBJECTIVE AND OBJECTIVE BOX
Chief complaint  Patient is a 63y old  Male who presents with a chief complaint of Back pain (30 Oct 2019 11:15)   Review of systems  Patient in bed, looks comfortable, no fever, no hypoglycemia.    Labs and Fingersticks  CAPILLARY BLOOD GLUCOSE      POCT Blood Glucose.: 132 mg/dL (30 Oct 2019 06:02)  POCT Blood Glucose.: 176 mg/dL (29 Oct 2019 21:45)  POCT Blood Glucose.: 125 mg/dL (29 Oct 2019 17:04)  POCT Blood Glucose.: 192 mg/dL (29 Oct 2019 13:13)      Anion Gap, Serum: 9 (10-30 @ 06:12)  Anion Gap, Serum: 19 <H> (10-29 @ 06:40)      Calcium, Total Serum: 9.6 (10-30 @ 06:12)  Calcium, Total Serum: 9.6 (10-29 @ 06:40)          10-30    135  |  101  |  16  ----------------------------<  121<H>  4.2   |  25  |  1.04    Ca    9.6      30 Oct 2019 06:12                          8.6    8.49  )-----------( 215      ( 30 Oct 2019 09:24 )             27.3     Medications  MEDICATIONS  (STANDING):  amLODIPine   Tablet 10 milliGRAM(s) Oral daily  chlorhexidine 4% Liquid 1 Application(s) Topical <User Schedule>  dextrose 5%. 1000 milliLiter(s) (50 mL/Hr) IV Continuous <Continuous>  dextrose 50% Injectable 12.5 Gram(s) IV Push once  dextrose 50% Injectable 25 Gram(s) IV Push once  dextrose 50% Injectable 25 Gram(s) IV Push once  diVALproex  milliGRAM(s) Oral daily  ferrous    sulfate 325 milliGRAM(s) Oral daily  folic acid 1 milliGRAM(s) Oral daily  gabapentin 100 milliGRAM(s) Oral three times a day  heparin  Injectable 5000 Unit(s) SubCutaneous two times a day  insulin glargine Injectable (LANTUS) 30 Unit(s) SubCutaneous at bedtime  insulin lispro (HumaLOG) corrective regimen sliding scale   SubCutaneous three times a day before meals  insulin lispro (HumaLOG) corrective regimen sliding scale   SubCutaneous at bedtime  insulin lispro Injectable (HumaLOG) 12 Unit(s) SubCutaneous three times a day before meals  levETIRAcetam 500 milliGRAM(s) Oral two times a day  mirtazapine 30 milliGRAM(s) Oral daily  pantoprazole  Injectable 40 milliGRAM(s) IV Push two times a day  piperacillin/tazobactam IVPB.. 3.375 Gram(s) IV Intermittent every 8 hours  polyethylene glycol 3350 17 Gram(s) Oral two times a day  senna 2 Tablet(s) Oral at bedtime  tiotropium 18 MICROgram(s) Capsule 1 Capsule(s) Inhalation daily  vancomycin  IVPB 1000 milliGRAM(s) IV Intermittent every 12 hours      Physical Exam  General: Patient comfortable in bed  Vital Signs Last 12 Hrs  T(F): 98.1 (10-30-19 @ 10:37), Max: 98.1 (10-30-19 @ 10:37)  HR: 82 (10-30-19 @ 10:37) (82 - 88)  BP: 121/70 (10-30-19 @ 10:37) (120/75 - 121/70)  BP(mean): --  RR: 18 (10-30-19 @ 10:37) (17 - 18)  SpO2: 96% (10-30-19 @ 10:37) (96% - 98%)  Neck: No palpable thyroid nodules.  CVS: S1S2, No murmurs  Respiratory: No wheezing, no crepitations  GI: Abdomen soft, bowel sounds positive  Musculoskeletal:  edema lower extremities.   Skin: No skin rashes, no ecchymosis    Diagnostics Chief complaint  Patient is a 63y old  Male who presents with a chief complaint of Back pain (30 Oct 2019 11:15)   Review of systems  Patient in bed, looks comfortable,  no fever, no hypoglycemia.    Labs and Fingersticks  CAPILLARY BLOOD GLUCOSE      POCT Blood Glucose.: 132 mg/dL (30 Oct 2019 06:02)  POCT Blood Glucose.: 176 mg/dL (29 Oct 2019 21:45)  POCT Blood Glucose.: 125 mg/dL (29 Oct 2019 17:04)  POCT Blood Glucose.: 192 mg/dL (29 Oct 2019 13:13)      Anion Gap, Serum: 9 (10-30 @ 06:12)  Anion Gap, Serum: 19 <H> (10-29 @ 06:40)      Calcium, Total Serum: 9.6 (10-30 @ 06:12)  Calcium, Total Serum: 9.6 (10-29 @ 06:40)          10-30    135  |  101  |  16  ----------------------------<  121<H>  4.2   |  25  |  1.04    Ca    9.6      30 Oct 2019 06:12                          8.6    8.49  )-----------( 215      ( 30 Oct 2019 09:24 )             27.3     Medications  MEDICATIONS  (STANDING):  amLODIPine   Tablet 10 milliGRAM(s) Oral daily  chlorhexidine 4% Liquid 1 Application(s) Topical <User Schedule>  dextrose 5%. 1000 milliLiter(s) (50 mL/Hr) IV Continuous <Continuous>  dextrose 50% Injectable 12.5 Gram(s) IV Push once  dextrose 50% Injectable 25 Gram(s) IV Push once  dextrose 50% Injectable 25 Gram(s) IV Push once  diVALproex  milliGRAM(s) Oral daily  ferrous    sulfate 325 milliGRAM(s) Oral daily  folic acid 1 milliGRAM(s) Oral daily  gabapentin 100 milliGRAM(s) Oral three times a day  heparin  Injectable 5000 Unit(s) SubCutaneous two times a day  insulin glargine Injectable (LANTUS) 30 Unit(s) SubCutaneous at bedtime  insulin lispro (HumaLOG) corrective regimen sliding scale   SubCutaneous three times a day before meals  insulin lispro (HumaLOG) corrective regimen sliding scale   SubCutaneous at bedtime  insulin lispro Injectable (HumaLOG) 12 Unit(s) SubCutaneous three times a day before meals  levETIRAcetam 500 milliGRAM(s) Oral two times a day  mirtazapine 30 milliGRAM(s) Oral daily  pantoprazole  Injectable 40 milliGRAM(s) IV Push two times a day  piperacillin/tazobactam IVPB.. 3.375 Gram(s) IV Intermittent every 8 hours  polyethylene glycol 3350 17 Gram(s) Oral two times a day  senna 2 Tablet(s) Oral at bedtime  tiotropium 18 MICROgram(s) Capsule 1 Capsule(s) Inhalation daily  vancomycin  IVPB 1000 milliGRAM(s) IV Intermittent every 12 hours      Physical Exam  General: Patient comfortable in bed  Vital Signs Last 12 Hrs  T(F): 98.1 (10-30-19 @ 10:37), Max: 98.1 (10-30-19 @ 10:37)  HR: 82 (10-30-19 @ 10:37) (82 - 88)  BP: 121/70 (10-30-19 @ 10:37) (120/75 - 121/70)  BP(mean): --  RR: 18 (10-30-19 @ 10:37) (17 - 18)  SpO2: 96% (10-30-19 @ 10:37) (96% - 98%)  Neck: No palpable thyroid nodules.  CVS: S1S2, No murmurs  Respiratory: No wheezing, no crepitations  GI: Abdomen soft, bowel sounds positive  Musculoskeletal:  edema lower extremities.   Skin: No skin rashes, no ecchymosis    Diagnostics

## 2019-10-30 NOTE — DIETITIAN INITIAL EVALUATION ADULT. - OTHER INFO
Per chart, pt with history of type 2 diabetes on oral medications at home now on insulin; noted 10/22 HbA1c 10.1%; followed by endocrinology.  Per endocrinology, pt counseled for compliance with consistent low carbohydrate diet. Pt with bilateral chronic foot ulcerations; per podiatry, no plan for further surgical intervention. Per RN, no reported N+V; last documented BM 10/27 however pt on bowel regimen. Will follow up to provide diet education as appropriate.    Weight: (10/30, bed) 200.1 lbs, (10/21, dosing) 190 lbs

## 2019-10-30 NOTE — PROGRESS NOTE ADULT - SUBJECTIVE AND OBJECTIVE BOX
63y old  Male who presents with a chief complaint of Back pain (30 Oct 2019 12:06)      Interval history:  Afebrile, complains of back pain, cursing, complains he is hungry.     No Known Allergies      Antimicrobials:  piperacillin/tazobactam IVPB.. 3.375 Gram(s) IV Intermittent every 8 hours  vancomycin  IVPB 1000 milliGRAM(s) IV Intermittent every 12 hours      REVIEW OF SYSTEMS:  No chest pain  No cough, no SOB  No N/V  No dysuria  No rash.       Vital Signs Last 24 Hrs  T(C): 36.7 (10-30-19 @ 10:37), Max: 36.7 (10-30-19 @ 10:37)  T(F): 98.1 (10-30-19 @ 10:37), Max: 98.1 (10-30-19 @ 10:37)  HR: 82 (10-30-19 @ 10:37) (82 - 98)  BP: 121/70 (10-30-19 @ 10:37) (120/75 - 134/82)  BP(mean): --  RR: 18 (10-30-19 @ 10:37) (17 - 18)  SpO2: 96% (10-30-19 @ 10:37) (96% - 98%)      PHYSICAL EXAM:  Patient in no acute distress. Alert, awake. Laying in bed.   No icterus, no oral ulcers.   Cardiovascular: S1S2 normal.  Lungs: + air entry B/L lung fields.  Gastrointestinal: soft, nontender, nondistended.  Extremities: no edema.  + PICC   b/l foot ulcers                          8.6    8.49  )-----------( 215      ( 30 Oct 2019 09:24 )             27.3   10-30    135  |  101  |  16  ----------------------------<  121<H>  4.2   |  25  |  1.04    Ca    9.6      30 Oct 2019 06:12      Culture - Blood (10.21.19 @ 13:45)    Specimen Source: .Blood    Culture Results:   No growth at 5 days.

## 2019-10-30 NOTE — CHART NOTE - NSCHARTNOTEFT_GEN_A_CORE
Interventional Radiology Pre-Procedure Note    This is a 63y Male with osteomyelitis of the foot presents with possible right psoas abscess/hematoma with concern for underlying neoplasm on MRI of the spine.     Procedure: CT guided psoas aspiration with fluid to be sent for cytology.     Diagnosis/Indication: Patient is a 63y old  Male who presents with a chief complaint of Back pain (30 Oct 2019 12:06)      PAST MEDICAL & SURGICAL HISTORY:  Diabetes  Osteomyelitis       Allergies: No Known Allergies      LABS:                        8.6    8.49  )-----------( 215      ( 30 Oct 2019 09:24 )             27.3     10-30    135  |  101  |  16  ----------------------------<  121<H>  4.2   |  25  |  1.04    Ca    9.6      30 Oct 2019 06:12      PT/INR - ( 30 Oct 2019 09:24 )   PT: 12.6 sec;   INR: 1.10 ratio         PTT - ( 30 Oct 2019 09:24 )  PTT:28.2 sec    Procedure/ risks/ benefits were explained, informed consent obtained from patient, verbalizes understanding.

## 2019-10-30 NOTE — PROGRESS NOTE ADULT - ASSESSMENT
Assessment  DMT2: 63y Male with DM T2 with hyperglycemia was on oral meds at home, now on insulin, FS improved and trending within acceptable range, no hypoglycemic episode, NPO for procedure today.  LE Abscess: Planning IR drainage of abscess today, on IV ABx, afebrile, stable, monitored.  HTN: Controlled,  on antihypertensive medications.  GIB: s/p egd/colonoscopy + gastritis, FU GI        Jose Alatorre MD  Cell: [phone]  Office: [phone] Assessment  DMT2: 63y Male with DM T2 with hyperglycemia was on oral meds at home, now on insulin, FS improved and trending within acceptable range, no hypoglycemic episode,  NPO for procedure today.  LE Abscess: Planning IR drainage of abscess today, on IV ABx, afebrile, stable, monitored.  HTN: Controlled,  on antihypertensive medications.  GIB: s/p egd/colonoscopy + gastritis, FU GI        Jose Alatorre MD  Cell: [phone]  Office: [phone]

## 2019-10-30 NOTE — PROGRESS NOTE ADULT - SUBJECTIVE AND OBJECTIVE BOX
Patient is a 63y old  Male who presents with a chief complaint of Back pain (22 Oct 2019 08:44)      SUBJECTIVE / OVERNIGHT EVENTS:  No new events, Afebrile  Scheduled for drainage of psoas abscess today  Review of Systems:   CONSTITUTIONAL: No fever, weight loss, or fatigue  EYES: No eye pain, visual disturbances, or discharge  ENMT:  No difficulty hearing, tinnitus, vertigo; No sinus or throat pain  NECK: No pain or stiffness  BREASTS: No pain, masses, or nipple discharge  RESPIRATORY: No cough, wheezing, chills or hemoptysis; No shortness of breath  CARDIOVASCULAR: No chest pain, palpitations, dizziness, or leg swelling  GASTROINTESTINAL: No abdominal or epigastric pain. No nausea, vomiting, or hematemesis; No diarrhea or constipation. No melena or hematochezia.  GENITOURINARY: No dysuria, frequency, hematuria, or incontinence  NEUROLOGICAL: No headaches, memory loss, loss of strength, numbness, or tremors  SKIN: No itching, burning, rashes, or lesions   LYMPH NODES: No enlarged glands  ENDOCRINE: No heat or cold intolerance; No hair loss  MUSCULOSKELETAL: No joint pain or swelling;   PSYCHIATRIC: No depression, anxiety, mood swings, or difficulty sleeping  HEME/LYMPH: No easy bruising, or bleeding gums  ALLERY AND IMMUNOLOGIC: No hives or eczema    MEDICATIONS  (STANDING):  amLODIPine   Tablet 10 milliGRAM(s) Oral daily  apixaban 5 milliGRAM(s) Oral every 12 hours  chlorhexidine 4% Liquid 1 Application(s) Topical <User Schedule>  dextrose 5%. 1000 milliLiter(s) (50 mL/Hr) IV Continuous <Continuous>  dextrose 50% Injectable 12.5 Gram(s) IV Push once  dextrose 50% Injectable 25 Gram(s) IV Push once  dextrose 50% Injectable 25 Gram(s) IV Push once  diVALproex  milliGRAM(s) Oral daily  ferrous    sulfate 325 milliGRAM(s) Oral daily  folic acid 1 milliGRAM(s) Oral daily  gabapentin 100 milliGRAM(s) Oral three times a day  insulin glargine Injectable (LANTUS) 26 Unit(s) SubCutaneous at bedtime  insulin lispro (HumaLOG) corrective regimen sliding scale   SubCutaneous three times a day before meals  insulin lispro (HumaLOG) corrective regimen sliding scale   SubCutaneous at bedtime  insulin lispro Injectable (HumaLOG) 10 Unit(s) SubCutaneous three times a day before meals  levETIRAcetam 500 milliGRAM(s) Oral two times a day  mirtazapine 30 milliGRAM(s) Oral daily  piperacillin/tazobactam IVPB.. 3.375 Gram(s) IV Intermittent every 8 hours  tiotropium 18 MICROgram(s) Capsule 1 Capsule(s) Inhalation daily  vancomycin  IVPB 1000 milliGRAM(s) IV Intermittent every 12 hours    MEDICATIONS  (PRN):  acetaminophen   Tablet .. 325 milliGRAM(s) Oral daily PRN Temp greater or equal to 38C (100.4F), Mild Pain (1 - 3)  ALBUTerol    90 MICROgram(s) HFA Inhaler 2 Puff(s) Inhalation every 6 hours PRN Shortness of Breath and/or Wheezing  dextrose 40% Gel 15 Gram(s) Oral once PRN Blood Glucose LESS THAN 70 milliGRAM(s)/deciliter  glucagon  Injectable 1 milliGRAM(s) IntraMuscular once PRN Glucose LESS THAN 70 milligrams/deciliter  HYDROmorphone  Injectable 1 milliGRAM(s) IV Push every 4 hours PRN Severe Pain (7 - 10)  oxyCODONE    5 mG/acetaminophen 325 mG 2 Tablet(s) Oral every 4 hours PRN Moderate Pain (4 - 6)  sodium chloride 0.9% lock flush 10 milliLiter(s) IV Push every 1 hour PRN Pre/post blood products, medications, blood draw, and to maintain line patency      PHYSICAL EXAM:  Vital Signs Last 24 Hrs  T(C): 36.8 (22 Oct 2019 09:12), Max: 37.3 (21 Oct 2019 15:24)  T(F): 98.2 (22 Oct 2019 09:12), Max: 99.1 (21 Oct 2019 15:24)  HR: 89 (22 Oct 2019 09:12) (88 - 99)  BP: 162/87 (22 Oct 2019 09:12) (144/74 - 162/87)  BP(mean): --  RR: 18 (22 Oct 2019 09:12) (18 - 18)  SpO2: 98% (22 Oct 2019 09:12) (96% - 98%)  I&O's Summary    21 Oct 2019 07:01  -  22 Oct 2019 07:00  --------------------------------------------------------  IN: 720 mL / OUT: 650 mL / NET: 70 mL    22 Oct 2019 07:01  -  22 Oct 2019 12:52  --------------------------------------------------------  IN: 600 mL / OUT: 450 mL / NET: 150 mL      GENERAL: NAD, well-developed  HEAD:  Atraumatic, Normocephalic  EYES: EOMI, PERRLA, conjunctiva and sclera clear  NECK: Supple, No JVD  CHEST/LUNG: Clear to auscultation bilaterally; No wheeze  HEART: Regular rate and rhythm; No murmurs, rubs, or gallops  ABDOMEN: Soft, Nontender, Nondistended; Bowel sounds present  EXTREMITIES:  elda TMA  PSYCH: AAOx3  NEUROLOGY: non-focal  SKIN: No rashes or lesions    LABS:  CAPILLARY BLOOD GLUCOSE      POCT Blood Glucose.: 157 mg/dL (22 Oct 2019 11:59)  POCT Blood Glucose.: 280 mg/dL (22 Oct 2019 08:57)  POCT Blood Glucose.: 299 mg/dL (21 Oct 2019 22:05)  POCT Blood Glucose.: 260 mg/dL (21 Oct 2019 17:11)                          8.3    9.26  )-----------( 401      ( 21 Oct 2019 09:40 )             26.8     10-21    135  |  100  |  11  ----------------------------<  184<H>  3.9   |  25  |  1.16    Ca    9.2      21 Oct 2019 09:40    TPro  8.9<H>  /  Alb  3.2<L>  /  TBili  0.3  /  DBili  x   /  AST  13  /  ALT  7<L>  /  AlkPhos  66  10-21    PT/INR - ( 21 Oct 2019 09:40 )   PT: 12.6 sec;   INR: 1.09 ratio         PTT - ( 21 Oct 2019 09:40 )  PTT:30.3 sec          RADIOLOGY & ADDITIONAL TESTS:    Imaging Personally Reviewed:    Consultant(s) Notes Reviewed:      Care Discussed with Consultants/Other Providers:

## 2019-10-30 NOTE — PROGRESS NOTE ADULT - ASSESSMENT
62 yo male with DM, HTN, and epilepsy was transferred from [facility] for possible T12-L1 discitis / osteomyelitis following a mechanical fall 5 days ago. Currently receiving IV Vanc/Zosyn for right foot osteomyelitis. Was seen at [facility] 10/20 where a CT abd/pelvis found an enlarged psoas muscle with ? abscesses with possible extension to the T12 L1 disc with a compression fracture suspicious for osteomyelitis.      Overall foot OM, concern for psoas abscess/spinal OM vs neoplasm, elevated ESR. Uncontrolled DM.       Plan:   - MRI with contrast of Thoracolumbar spine concerning for infection vs neoplasm.   - planned for core biopsy, flow cytometry, bacterial, fungal and AFB cx to be performed today.   - f/u BCx, NTD.   - c/w Zosyn,   - C/W Vanc 1g q12.   - trough low, continue with the same dose. Recheck trough tomorrow.   - S/p Neurosurgery eval   - s/p podiatry eval.   - Positive Hep C serology, viral load almost undetectable.

## 2019-10-30 NOTE — DIETITIAN INITIAL EVALUATION ADULT. - ENERGY NEEDS
Ht: 72 in, Wt: 190 lbs (dosing, 10/21), BMI: 25.8 kg/m2, IBW: 178+/-10%, %IBW: 107  PMH: T2DM, HTN, epilepsy, osteomyelitis  Skin per nursing flowsheets: R foot amputation wound, L foot toes & medial wound. No edema noted at this time.

## 2019-10-30 NOTE — PROGRESS NOTE ADULT - PROBLEM SELECTOR PLAN 1
ID FU noted. Drainage planned for today by IR  Cont IV antibiotics. L1 appears neoplastic?  Spine surgery to opine on further management

## 2019-10-30 NOTE — PRE-ANESTHESIA EVALUATION ADULT - NSANTHPMHFT_GEN_ALL_CORE
63 y.o. male with multiple chronic medical issues including DM, HTN, and epilepsy was transferred from [facility] for evaluation of a possible ? discitis / osteomyelitis following a mechanical fall landing on his tailbone when he tried to get up from his wheelchair 5 days ago. He is currently residing at a nursing facility for IV antibiotics via left PICC (Vanc / Zosyn) for right foot osteomyelitis.  Was seen at [place] ED after the fall and discharged home, but lower back pain persisted and worsened. He was seen at [facility] 10/20 where a CT abd/pelvis found an enlarged psoas muscle with ? abscesses with possible extension to the T12 L1 disc with a compression fracture suspicious for osteomyelitis.  He complains of fevers and chills, constipation x1 week. Denies abd pain, nausea or nausea/vomiting, numbness, or weakness.  Movement makes the pain worse, Dilaudid makes it better. Pt not very clear with timeline of abx, says PICC was placed a week ago at [facility]. He says he has been on abx since 2014 for the foot at different hospitals. Denies any fever or chills since the fall. No urinary or bowel incontinence.  Last sz 7-8 mo ago  COPD controlled  wheelchair bound due to multiple foot sx    PSH 14 foot sz, lung resection R for empyema 1975, [facility]

## 2019-10-31 LAB
ALBUMIN SERPL ELPH-MCNC: 3.4 G/DL — SIGNIFICANT CHANGE UP (ref 3.3–5)
ALP SERPL-CCNC: 60 U/L — SIGNIFICANT CHANGE UP (ref 40–120)
ALT FLD-CCNC: 6 U/L — LOW (ref 10–45)
ANION GAP SERPL CALC-SCNC: 15 MMOL/L — SIGNIFICANT CHANGE UP (ref 5–17)
AST SERPL-CCNC: 11 U/L — SIGNIFICANT CHANGE UP (ref 10–40)
BASOPHILS # BLD AUTO: 0.12 K/UL — SIGNIFICANT CHANGE UP (ref 0–0.2)
BASOPHILS NFR BLD AUTO: 1.5 % — SIGNIFICANT CHANGE UP (ref 0–2)
BILIRUB SERPL-MCNC: 0.3 MG/DL — SIGNIFICANT CHANGE UP (ref 0.2–1.2)
BUN SERPL-MCNC: 14 MG/DL — SIGNIFICANT CHANGE UP (ref 7–23)
CALCIUM SERPL-MCNC: 9.9 MG/DL — SIGNIFICANT CHANGE UP (ref 8.4–10.5)
CHLORIDE SERPL-SCNC: 94 MMOL/L — LOW (ref 96–108)
CO2 SERPL-SCNC: 25 MMOL/L — SIGNIFICANT CHANGE UP (ref 22–31)
CREAT SERPL-MCNC: 1.13 MG/DL — SIGNIFICANT CHANGE UP (ref 0.5–1.3)
EOSINOPHIL # BLD AUTO: 0.44 K/UL — SIGNIFICANT CHANGE UP (ref 0–0.5)
EOSINOPHIL NFR BLD AUTO: 5.4 % — SIGNIFICANT CHANGE UP (ref 0–6)
GLUCOSE BLDC GLUCOMTR-MCNC: 113 MG/DL — HIGH (ref 70–99)
GLUCOSE BLDC GLUCOMTR-MCNC: 129 MG/DL — HIGH (ref 70–99)
GLUCOSE BLDC GLUCOMTR-MCNC: 159 MG/DL — HIGH (ref 70–99)
GLUCOSE BLDC GLUCOMTR-MCNC: 232 MG/DL — HIGH (ref 70–99)
GLUCOSE BLDC GLUCOMTR-MCNC: 89 MG/DL — SIGNIFICANT CHANGE UP (ref 70–99)
GLUCOSE SERPL-MCNC: 183 MG/DL — HIGH (ref 70–99)
GRAM STN FLD: SIGNIFICANT CHANGE UP
HCT VFR BLD CALC: 28.8 % — LOW (ref 39–50)
HGB BLD-MCNC: 9 G/DL — LOW (ref 13–17)
IMM GRANULOCYTES NFR BLD AUTO: 0.2 % — SIGNIFICANT CHANGE UP (ref 0–1.5)
LYMPHOCYTES # BLD AUTO: 2.77 K/UL — SIGNIFICANT CHANGE UP (ref 1–3.3)
LYMPHOCYTES # BLD AUTO: 33.8 % — SIGNIFICANT CHANGE UP (ref 13–44)
MCHC RBC-ENTMCNC: 28.6 PG — SIGNIFICANT CHANGE UP (ref 27–34)
MCHC RBC-ENTMCNC: 31.3 GM/DL — LOW (ref 32–36)
MCV RBC AUTO: 91.4 FL — SIGNIFICANT CHANGE UP (ref 80–100)
MONOCYTES # BLD AUTO: 0.62 K/UL — SIGNIFICANT CHANGE UP (ref 0–0.9)
MONOCYTES NFR BLD AUTO: 7.6 % — SIGNIFICANT CHANGE UP (ref 2–14)
NEUTROPHILS # BLD AUTO: 4.23 K/UL — SIGNIFICANT CHANGE UP (ref 1.8–7.4)
NEUTROPHILS NFR BLD AUTO: 51.5 % — SIGNIFICANT CHANGE UP (ref 43–77)
NIGHT BLUE STAIN TISS: SIGNIFICANT CHANGE UP
PLATELET # BLD AUTO: 225 K/UL — SIGNIFICANT CHANGE UP (ref 150–400)
POTASSIUM SERPL-MCNC: 3.9 MMOL/L — SIGNIFICANT CHANGE UP (ref 3.5–5.3)
POTASSIUM SERPL-SCNC: 3.9 MMOL/L — SIGNIFICANT CHANGE UP (ref 3.5–5.3)
PROT SERPL-MCNC: 8.8 G/DL — HIGH (ref 6–8.3)
RBC # BLD: 3.15 M/UL — LOW (ref 4.2–5.8)
RBC # FLD: 15.2 % — HIGH (ref 10.3–14.5)
SODIUM SERPL-SCNC: 134 MMOL/L — LOW (ref 135–145)
SPECIMEN SOURCE: SIGNIFICANT CHANGE UP
SPECIMEN SOURCE: SIGNIFICANT CHANGE UP
WBC # BLD: 8.2 K/UL — SIGNIFICANT CHANGE UP (ref 3.8–10.5)
WBC # FLD AUTO: 8.2 K/UL — SIGNIFICANT CHANGE UP (ref 3.8–10.5)

## 2019-10-31 PROCEDURE — 99232 SBSQ HOSP IP/OBS MODERATE 35: CPT

## 2019-10-31 RX ORDER — GABAPENTIN 400 MG/1
300 CAPSULE ORAL THREE TIMES A DAY
Refills: 0 | Status: DISCONTINUED | OUTPATIENT
Start: 2019-10-31 | End: 2019-11-08

## 2019-10-31 RX ORDER — CHLORHEXIDINE GLUCONATE 213 G/1000ML
1 SOLUTION TOPICAL DAILY
Refills: 0 | Status: DISCONTINUED | OUTPATIENT
Start: 2019-10-31 | End: 2019-11-08

## 2019-10-31 RX ORDER — GABAPENTIN 400 MG/1
200 CAPSULE ORAL THREE TIMES A DAY
Refills: 0 | Status: DISCONTINUED | OUTPATIENT
Start: 2019-10-31 | End: 2019-10-31

## 2019-10-31 RX ORDER — OXYCODONE AND ACETAMINOPHEN 5; 325 MG/1; MG/1
2 TABLET ORAL EVERY 4 HOURS
Refills: 0 | Status: DISCONTINUED | OUTPATIENT
Start: 2019-10-31 | End: 2019-11-07

## 2019-10-31 RX ORDER — MEROPENEM 1 G/30ML
1000 INJECTION INTRAVENOUS EVERY 8 HOURS
Refills: 0 | Status: DISCONTINUED | OUTPATIENT
Start: 2019-10-31 | End: 2019-11-08

## 2019-10-31 RX ADMIN — PIPERACILLIN AND TAZOBACTAM 25 GRAM(S): 4; .5 INJECTION, POWDER, LYOPHILIZED, FOR SOLUTION INTRAVENOUS at 13:37

## 2019-10-31 RX ADMIN — Medication 325 MILLIGRAM(S): at 12:39

## 2019-10-31 RX ADMIN — INSULIN GLARGINE 30 UNIT(S): 100 INJECTION, SOLUTION SUBCUTANEOUS at 22:49

## 2019-10-31 RX ADMIN — CHLORHEXIDINE GLUCONATE 1 APPLICATION(S): 213 SOLUTION TOPICAL at 12:38

## 2019-10-31 RX ADMIN — SENNA PLUS 2 TABLET(S): 8.6 TABLET ORAL at 21:24

## 2019-10-31 RX ADMIN — GABAPENTIN 300 MILLIGRAM(S): 400 CAPSULE ORAL at 21:24

## 2019-10-31 RX ADMIN — HYDROMORPHONE HYDROCHLORIDE 1 MILLIGRAM(S): 2 INJECTION INTRAMUSCULAR; INTRAVENOUS; SUBCUTANEOUS at 10:50

## 2019-10-31 RX ADMIN — TIOTROPIUM BROMIDE 1 CAPSULE(S): 18 CAPSULE ORAL; RESPIRATORY (INHALATION) at 12:39

## 2019-10-31 RX ADMIN — LEVETIRACETAM 500 MILLIGRAM(S): 250 TABLET, FILM COATED ORAL at 06:10

## 2019-10-31 RX ADMIN — DIVALPROEX SODIUM 500 MILLIGRAM(S): 500 TABLET, DELAYED RELEASE ORAL at 12:39

## 2019-10-31 RX ADMIN — AMLODIPINE BESYLATE 10 MILLIGRAM(S): 2.5 TABLET ORAL at 06:11

## 2019-10-31 RX ADMIN — HYDROMORPHONE HYDROCHLORIDE 1 MILLIGRAM(S): 2 INJECTION INTRAMUSCULAR; INTRAVENOUS; SUBCUTANEOUS at 06:53

## 2019-10-31 RX ADMIN — OXYCODONE AND ACETAMINOPHEN 2 TABLET(S): 5; 325 TABLET ORAL at 12:18

## 2019-10-31 RX ADMIN — HYDROMORPHONE HYDROCHLORIDE 1 MILLIGRAM(S): 2 INJECTION INTRAMUSCULAR; INTRAVENOUS; SUBCUTANEOUS at 19:01

## 2019-10-31 RX ADMIN — HYDROMORPHONE HYDROCHLORIDE 1 MILLIGRAM(S): 2 INJECTION INTRAMUSCULAR; INTRAVENOUS; SUBCUTANEOUS at 14:29

## 2019-10-31 RX ADMIN — HYDROMORPHONE HYDROCHLORIDE 1 MILLIGRAM(S): 2 INJECTION INTRAMUSCULAR; INTRAVENOUS; SUBCUTANEOUS at 23:00

## 2019-10-31 RX ADMIN — HYDROMORPHONE HYDROCHLORIDE 1 MILLIGRAM(S): 2 INJECTION INTRAMUSCULAR; INTRAVENOUS; SUBCUTANEOUS at 14:55

## 2019-10-31 RX ADMIN — HYDROMORPHONE HYDROCHLORIDE 1 MILLIGRAM(S): 2 INJECTION INTRAMUSCULAR; INTRAVENOUS; SUBCUTANEOUS at 18:36

## 2019-10-31 RX ADMIN — Medication 12 UNIT(S): at 17:41

## 2019-10-31 RX ADMIN — HYDROMORPHONE HYDROCHLORIDE 1 MILLIGRAM(S): 2 INJECTION INTRAMUSCULAR; INTRAVENOUS; SUBCUTANEOUS at 10:13

## 2019-10-31 RX ADMIN — HEPARIN SODIUM 5000 UNIT(S): 5000 INJECTION INTRAVENOUS; SUBCUTANEOUS at 06:10

## 2019-10-31 RX ADMIN — GABAPENTIN 300 MILLIGRAM(S): 400 CAPSULE ORAL at 13:37

## 2019-10-31 RX ADMIN — PANTOPRAZOLE SODIUM 40 MILLIGRAM(S): 20 TABLET, DELAYED RELEASE ORAL at 17:47

## 2019-10-31 RX ADMIN — OXYCODONE AND ACETAMINOPHEN 2 TABLET(S): 5; 325 TABLET ORAL at 12:50

## 2019-10-31 RX ADMIN — MEROPENEM 100 MILLIGRAM(S): 1 INJECTION INTRAVENOUS at 21:22

## 2019-10-31 RX ADMIN — CHLORHEXIDINE GLUCONATE 1 APPLICATION(S): 213 SOLUTION TOPICAL at 06:11

## 2019-10-31 RX ADMIN — PANTOPRAZOLE SODIUM 40 MILLIGRAM(S): 20 TABLET, DELAYED RELEASE ORAL at 06:11

## 2019-10-31 RX ADMIN — PIPERACILLIN AND TAZOBACTAM 25 GRAM(S): 4; .5 INJECTION, POWDER, LYOPHILIZED, FOR SOLUTION INTRAVENOUS at 06:10

## 2019-10-31 RX ADMIN — HYDROMORPHONE HYDROCHLORIDE 1 MILLIGRAM(S): 2 INJECTION INTRAMUSCULAR; INTRAVENOUS; SUBCUTANEOUS at 06:23

## 2019-10-31 RX ADMIN — HEPARIN SODIUM 5000 UNIT(S): 5000 INJECTION INTRAVENOUS; SUBCUTANEOUS at 17:41

## 2019-10-31 RX ADMIN — LEVETIRACETAM 500 MILLIGRAM(S): 250 TABLET, FILM COATED ORAL at 17:41

## 2019-10-31 RX ADMIN — OXYCODONE AND ACETAMINOPHEN 2 TABLET(S): 5; 325 TABLET ORAL at 16:30

## 2019-10-31 RX ADMIN — Medication 1 MILLIGRAM(S): at 12:39

## 2019-10-31 RX ADMIN — HYDROMORPHONE HYDROCHLORIDE 1 MILLIGRAM(S): 2 INJECTION INTRAMUSCULAR; INTRAVENOUS; SUBCUTANEOUS at 02:40

## 2019-10-31 RX ADMIN — POLYETHYLENE GLYCOL 3350 17 GRAM(S): 17 POWDER, FOR SOLUTION ORAL at 17:41

## 2019-10-31 RX ADMIN — POLYETHYLENE GLYCOL 3350 17 GRAM(S): 17 POWDER, FOR SOLUTION ORAL at 06:11

## 2019-10-31 RX ADMIN — Medication 2: at 12:38

## 2019-10-31 RX ADMIN — GABAPENTIN 200 MILLIGRAM(S): 400 CAPSULE ORAL at 06:10

## 2019-10-31 RX ADMIN — HYDROMORPHONE HYDROCHLORIDE 1 MILLIGRAM(S): 2 INJECTION INTRAMUSCULAR; INTRAVENOUS; SUBCUTANEOUS at 02:23

## 2019-10-31 RX ADMIN — Medication 12 UNIT(S): at 12:38

## 2019-10-31 RX ADMIN — Medication 1: at 08:25

## 2019-10-31 RX ADMIN — MIRTAZAPINE 30 MILLIGRAM(S): 45 TABLET, ORALLY DISINTEGRATING ORAL at 21:24

## 2019-10-31 RX ADMIN — HYDROMORPHONE HYDROCHLORIDE 1 MILLIGRAM(S): 2 INJECTION INTRAMUSCULAR; INTRAVENOUS; SUBCUTANEOUS at 22:34

## 2019-10-31 RX ADMIN — Medication 250 MILLIGRAM(S): at 11:30

## 2019-10-31 RX ADMIN — OXYCODONE AND ACETAMINOPHEN 2 TABLET(S): 5; 325 TABLET ORAL at 16:03

## 2019-10-31 NOTE — CHART NOTE - NSCHARTNOTEFT_GEN_A_CORE
Patient seen for nutrition follow up. Pt s/p psoas muscle abscess collection 10/30, s/p spine MRI appears to have osteomyelitis.     Source: Pt, previous RD note, comprehensive chart review.    Diet: DASH/TLC    Patient reports normal appetite & intake prior to OSH admission for mechanical fall ~3 weeks ago; pt prepares his own food & normal intake of 3 meals daily. Pt reports no avoidance of foods, does not follow special diet at home. Pt states he watches portions to manage diabetes; reports compliance with oral diabetes medications at home & states he checks fingersticks 3x daily but does not know normal blood glucose reading; noted pt with HbA1c 10.1% 10/22), questionable accuracy of pt report.  Pt denies N+V, no reported food allergies. Noted pt last BM 10/27, ordered for bowel regimen; denies bloating, cramping & constipation at this time.    PO intake:  Pt states poor appetite & intake upon admission however now resolved & pt with >50% intake of 3 meals/day. Pt visibly consuming breakfast at time of visit; diet recall & pt preference requests including high amounts of refined carbohydrates.     Source for PO intake: Pt, nursing flowsheets.    Weights: (10/30, bed) 200.1 lbs (10/30, dosing) 189.6 lbs  Pt reports usual body weight 185-190 lbs which he has maintained for years & is in line with current dosing weight.    Pertinent Medications: Protonix, ferrous sulfate, folic acid, senna, miralax, dulcolax suppository prn, lantus 30 units @ bedtime, lispro 12 units before meals 3x daily prn, humalog corrective sliding scale.   Pertinent Labs: (10/31) Na 134 <L>, Cl 94 <L>, Glu 153 <H>. (10/22) HbA1c 10.1%. POCT: (10/31) 159 (10/30) 132-152.     Skin per nursing flowsheets: R foot amputation wound, L foot toes & medial wound. No edema noted at this time.    Estimated Needs:   [X] no change since previous assessment    Previous Nutrition Diagnosis: [X] No previous diagnosis  Nutrition Diagnosis is: [X] Listed below    New Nutrition Diagnosis: [X] Limited adherence to nutrition related recommendations  Related to: [X] Limited interested in applying learned information, lack of value for behavior change  As evidenced by: [X] Pt diet recall high in refined carbohydrates, HbA1c 10.1%    Interventions:   1)Change diet to Consistent Carbohydrate with evening snack + DASH/TLC; spoke with NP.   2)Provided pt with written & verbal diabetic nutrition education. Topics discussed include optimal carbohydrate sources, appropriate portion sizes, including whole grains and fiber rich foods, pairing protein with carbohydrates at meals/snacks, checking blood glucose throughout the day & compliance with diabetic medications as appropriate.   3)Monitor po intake, weight, nutrition related labs, skin integrity, BMs/GI distress, adherence to therapeutic diet recommendations.    RD remains available upon request and will follow up per protocol.    Shirley Cabral, Dietetic Intern Pager #[phone]

## 2019-10-31 NOTE — PROGRESS NOTE ADULT - PROBLEM SELECTOR PLAN 1
Will continue current insulin regimen for now. Will continue monitoring FS, log, and FU.  Patient counseled for compliance with consistent low carb diet and exercise as tolerated outpatient. Will continue current insulin regimen for now. Will continue monitoring FS, log, and FU.

## 2019-10-31 NOTE — PROGRESS NOTE ADULT - SUBJECTIVE AND OBJECTIVE BOX
Interventional Radiology Follow- Up Note      63y Male s/p right psoas collection aspiration on 10/30/19 in Interventional Radiology with Dr. Ewing. Patient seen and examined @ bedside.   Site c/d/i  Cx results ongoing.   Continues to report back pain.     Vitals: T(F): 98.5 (10-31-19 @ 04:37), Max: 98.5 (10-31-19 @ 04:37)  HR: 90 (10-31-19 @ 04:37) (82 - 95)  BP: 132/74 (10-31-19 @ 04:37) (121/70 - 155/83)  RR: 18 (10-31-19 @ 04:37) (14 - 18)  SpO2: 96% (10-31-19 @ 04:37) (95% - 100%)  Wt(kg): --    LABS:                        8.6    8.49  )-----------( 215      ( 30 Oct 2019 09:24 )             27.3     10-31    134<L>  |  94<L>  |  14  ----------------------------<  183<H>  3.9   |  25  |  1.13    Ca    9.9      31 Oct 2019 07:12    TPro  8.8<H>  /  Alb  3.4  /  TBili  0.3  /  DBili  x   /  AST  11  /  ALT  6<L>  /  AlkPhos  60  10-31    PT/INR - ( 30 Oct 2019 09:24 )   PT: 12.6 sec;   INR: 1.10 ratio         PTT - ( 30 Oct 2019 09:24 )  PTT:28.2 sec  I&O's Detail    30 Oct 2019 07:01  -  31 Oct 2019 07:00  --------------------------------------------------------  IN:    Oral Fluid: 240 mL  Total IN: 240 mL    OUT:    Voided: 2000 mL  Total OUT: 2000 mL    Total NET: -1760 mL    PHYSICAL EXAM:    General: Nontoxic, in mild distress secondary to pain  Neuro:  Alert & oriented x 3  Extremities: Puncture site on right back is c/d/i.     Impression: 63y Male s/p CT guided right psoas aspiration. 30cc of purulent/bloody fluid aspirated and sent for culture, fungal culture and AFB. Lesion not amenable to core biopsy as it was mainly liquified. Specimen sent for cytology instead.    Plan:  - trend vitals, labs  - follow up cultures and cytology      Please call IR at extension [phone] with any questions, concerns, or issues regarding above.

## 2019-10-31 NOTE — PROGRESS NOTE ADULT - ASSESSMENT
62 yo male with DM, HTN, and epilepsy was transferred from [facility] for possible T12-L1 discitis / osteomyelitis following a mechanical fall 5 days ago. Currently receiving IV Vanc/Zosyn for right foot osteomyelitis. Was seen at [facility] 10/20 where a CT abd/pelvis found an enlarged psoas muscle with ? abscesses with possible extension to the T12 L1 disc with a compression fracture suspicious for osteomyelitis.      Overall foot OM, concern for psoas abscess/spinal OM, elevated ESR. Uncontrolled DM.       Plan:  - s/p IR guided aspiration of purulent fluid, bacterial, fungal and AFB cx NTD, cytology pending.   - f/u BCx, NTD.   - will switch zosyn to Meropenem given concern for infection.   - C/W Vanc 1g q12.   - reordered trough   - S/p Neurosurgery eval   - s/p podiatry eval.   - Positive Hep C serology, viral load almost undetectable.

## 2019-10-31 NOTE — PROGRESS NOTE ADULT - ASSESSMENT
Assessment  DMT2: 63y Male with DM T2 with hyperglycemia was on oral meds at home, now on insulin, FS improved and trending within acceptable range, no hypoglycemic episode, still complaining of back pain.  LE Abscess: S/P IR drainage of abscess, culture pending, on IV ABx, afebrile, stable, monitored.  HTN: Controlled,  on antihypertensive medications.  GIB: s/p egd/colonoscopy + gastritis, FU GI        Jose Alatorre MD  Cell: [phone]  Office: [phone] Assessment  DMT2: 63y Male with DM T2 with hyperglycemia was on oral meds at home, now on insulin,  FS improved and trending within acceptable range, no hypoglycemic episode, still complaining of back pain.  LE Abscess: S/P IR drainage of abscess, culture pending, on IV ABx, afebrile, stable, monitored.  HTN: Controlled,  on antihypertensive medications.  GIB: s/p egd/colonoscopy + gastritis, FU GI        Jose Alatorre MD  Cell: [phone]  Office: [phone]

## 2019-10-31 NOTE — PROGRESS NOTE ADULT - SUBJECTIVE AND OBJECTIVE BOX
63y old  Male who presents with a chief complaint of Back pain (31 Oct 2019 12:28)      Interval history:  Afebrile, still with back pain, otherwise more calm today.     No Known Allergies      Antimicrobials:  piperacillin/tazobactam IVPB.. 3.375 Gram(s) IV Intermittent every 8 hours  vancomycin  IVPB 1000 milliGRAM(s) IV Intermittent every 12 hours      REVIEW OF SYSTEMS:  No chest pain   No cough, no SOB  No N/V  No dysuria   No rash.       Vital Signs Last 24 Hrs  T(C): 37.3 (10-31-19 @ 13:30), Max: 37.3 (10-31-19 @ 13:30)  T(F): 99.1 (10-31-19 @ 13:30), Max: 99.1 (10-31-19 @ 13:30)  HR: 92 (10-31-19 @ 13:30) (84 - 95)  BP: 149/84 (10-31-19 @ 13:30) (129/70 - 155/83)  BP(mean): 99 (10-30-19 @ 18:30) (99 - 103)  RR: 18 (10-31-19 @ 13:30) (14 - 18)  SpO2: 97% (10-31-19 @ 13:30) (95% - 100%)      PHYSICAL EXAM:  Patient in no acute distress. Alert, awake. Laying in bed.   Cardiovascular: S1S2 normal.  Lungs: + air entry B/L lung fields.  Gastrointestinal: soft, nontender, nondistended.  Extremities: no edema.  + PICC   b/l foot ulcers                            9.0    8.20  )-----------( 225      ( 31 Oct 2019 12:11 )             28.8   10-31    134<L>  |  94<L>  |  14  ----------------------------<  183<H>  3.9   |  25  |  1.13    Ca    9.9      31 Oct 2019 07:12    TPro  8.8<H>  /  Alb  3.4  /  TBili  0.3  /  DBili  x   /  AST  11  /  ALT  6<L>  /  AlkPhos  60  10-31      LIVER FUNCTIONS - ( 31 Oct 2019 07:12 )  Alb: 3.4 g/dL / Pro: 8.8 g/dL / ALK PHOS: 60 U/L / ALT: 6 U/L / AST: 11 U/L / GGT: x           Culture - Acid Fast - Other w/Smear (collected 30 Oct 2019 23:21)  Source: .Other psoas muscle collection    Culture - Body Fluid with Gram Stain (collected 30 Oct 2019 23:13)  Source: .Body Fluid Right psoas collection  Gram Stain (31 Oct 2019 01:21):    Numerous polymorphonuclear leukocytes per low power field    No organisms seen    Culture - Fungal, Other (collected 30 Oct 2019 22:37)  Source: .Other Other  Preliminary Report (31 Oct 2019 08:44):    Testing in progress

## 2019-10-31 NOTE — PROGRESS NOTE ADULT - SUBJECTIVE AND OBJECTIVE BOX
Chief complaint  Patient is a 63y old  Male who presents with a chief complaint of Back pain (31 Oct 2019 09:57)   Review of systems  Patient in bed, looks comfortable, no fever, no hypoglycemia.    Labs and Fingersticks  CAPILLARY BLOOD GLUCOSE      POCT Blood Glucose.: 159 mg/dL (31 Oct 2019 08:11)  POCT Blood Glucose.: 132 mg/dL (30 Oct 2019 21:41)  POCT Blood Glucose.: 152 mg/dL (30 Oct 2019 19:59)  POCT Blood Glucose.: 134 mg/dL (30 Oct 2019 13:38)      Anion Gap, Serum: 15 (10-31 @ 07:12)  Anion Gap, Serum: 9 (10-30 @ 06:12)      Calcium, Total Serum: 9.9 (10-31 @ 07:12)  Calcium, Total Serum: 9.6 (10-30 @ 06:12)  Albumin, Serum: 3.4 (10-31 @ 07:12)    Alanine Aminotransferase (ALT/SGPT): 6 <L> (10-31 @ 07:12)  Alkaline Phosphatase, Serum: 60 (10-31 @ 07:12)  Aspartate Aminotransferase (AST/SGOT): 11 (10-31 @ 07:12)        10-31    134<L>  |  94<L>  |  14  ----------------------------<  183<H>  3.9   |  25  |  1.13    Ca    9.9      31 Oct 2019 07:12    TPro  8.8<H>  /  Alb  3.4  /  TBili  0.3  /  DBili  x   /  AST  11  /  ALT  6<L>  /  AlkPhos  60  10-31                        8.6    8.49  )-----------( 215      ( 30 Oct 2019 09:24 )             27.3     Medications  MEDICATIONS  (STANDING):  amLODIPine   Tablet 10 milliGRAM(s) Oral daily  chlorhexidine 2% Cloths 1 Application(s) Topical daily  chlorhexidine 4% Liquid 1 Application(s) Topical <User Schedule>  dextrose 5%. 1000 milliLiter(s) (50 mL/Hr) IV Continuous <Continuous>  dextrose 50% Injectable 12.5 Gram(s) IV Push once  dextrose 50% Injectable 25 Gram(s) IV Push once  dextrose 50% Injectable 25 Gram(s) IV Push once  diVALproex  milliGRAM(s) Oral daily  ferrous    sulfate 325 milliGRAM(s) Oral daily  folic acid 1 milliGRAM(s) Oral daily  gabapentin 300 milliGRAM(s) Oral three times a day  heparin  Injectable 5000 Unit(s) SubCutaneous two times a day  insulin glargine Injectable (LANTUS) 30 Unit(s) SubCutaneous at bedtime  insulin lispro (HumaLOG) corrective regimen sliding scale   SubCutaneous three times a day before meals  insulin lispro (HumaLOG) corrective regimen sliding scale   SubCutaneous at bedtime  insulin lispro Injectable (HumaLOG) 12 Unit(s) SubCutaneous three times a day before meals  levETIRAcetam 500 milliGRAM(s) Oral two times a day  mirtazapine 30 milliGRAM(s) Oral daily  pantoprazole  Injectable 40 milliGRAM(s) IV Push two times a day  piperacillin/tazobactam IVPB.. 3.375 Gram(s) IV Intermittent every 8 hours  polyethylene glycol 3350 17 Gram(s) Oral two times a day  senna 2 Tablet(s) Oral at bedtime  tiotropium 18 MICROgram(s) Capsule 1 Capsule(s) Inhalation daily  vancomycin  IVPB 1000 milliGRAM(s) IV Intermittent every 12 hours      Physical Exam  Culture - Body Fluid with Gram Stain (collected 10-30-19 @ 23:13)  Source: .Body Fluid Right psoas collection  Gram Stain (10-31-19 @ 01:21):    Numerous polymorphonuclear leukocytes per low power field    No organisms seen    Culture - Fungal, Other (collected 10-30-19 @ 22:37)  Source: .Other Other  Preliminary Report (10-31-19 @ 08:44):    Testing in progress      General: Patient comfortable in bed  Vital Signs Last 12 Hrs  T(F): 98.5 (10-31-19 @ 04:37), Max: 98.5 (10-31-19 @ 04:37)  HR: 90 (10-31-19 @ 04:37) (88 - 90)  BP: 132/74 (10-31-19 @ 04:37) (132/74 - 136/75)  BP(mean): --  RR: 18 (10-31-19 @ 04:37) (17 - 18)  SpO2: 96% (10-31-19 @ 04:37) (96% - 97%)  Neck: No palpable thyroid nodules.  CVS: S1S2, No murmurs  Respiratory: No wheezing, no crepitations  GI: Abdomen soft, bowel sounds positive  Musculoskeletal:  edema lower extremities.   Skin: No skin rashes, no ecchymosis    Diagnostics Chief complaint  Patient is a 63y old  Male who presents with a chief complaint of Back  pain (31 Oct 2019 09:57)   Review of systems  Patient in bed, looks comfortable, no fever, no hypoglycemia.    Labs and Fingersticks  CAPILLARY BLOOD GLUCOSE      POCT Blood Glucose.: 159 mg/dL (31 Oct 2019 08:11)  POCT Blood Glucose.: 132 mg/dL (30 Oct 2019 21:41)  POCT Blood Glucose.: 152 mg/dL (30 Oct 2019 19:59)  POCT Blood Glucose.: 134 mg/dL (30 Oct 2019 13:38)      Anion Gap, Serum: 15 (10-31 @ 07:12)  Anion Gap, Serum: 9 (10-30 @ 06:12)      Calcium, Total Serum: 9.9 (10-31 @ 07:12)  Calcium, Total Serum: 9.6 (10-30 @ 06:12)  Albumin, Serum: 3.4 (10-31 @ 07:12)    Alanine Aminotransferase (ALT/SGPT): 6 <L> (10-31 @ 07:12)  Alkaline Phosphatase, Serum: 60 (10-31 @ 07:12)  Aspartate Aminotransferase (AST/SGOT): 11 (10-31 @ 07:12)        10-31    134<L>  |  94<L>  |  14  ----------------------------<  183<H>  3.9   |  25  |  1.13    Ca    9.9      31 Oct 2019 07:12    TPro  8.8<H>  /  Alb  3.4  /  TBili  0.3  /  DBili  x   /  AST  11  /  ALT  6<L>  /  AlkPhos  60  10-31                        8.6    8.49  )-----------( 215      ( 30 Oct 2019 09:24 )             27.3     Medications  MEDICATIONS  (STANDING):  amLODIPine   Tablet 10 milliGRAM(s) Oral daily  chlorhexidine 2% Cloths 1 Application(s) Topical daily  chlorhexidine 4% Liquid 1 Application(s) Topical <User Schedule>  dextrose 5%. 1000 milliLiter(s) (50 mL/Hr) IV Continuous <Continuous>  dextrose 50% Injectable 12.5 Gram(s) IV Push once  dextrose 50% Injectable 25 Gram(s) IV Push once  dextrose 50% Injectable 25 Gram(s) IV Push once  diVALproex  milliGRAM(s) Oral daily  ferrous    sulfate 325 milliGRAM(s) Oral daily  folic acid 1 milliGRAM(s) Oral daily  gabapentin 300 milliGRAM(s) Oral three times a day  heparin  Injectable 5000 Unit(s) SubCutaneous two times a day  insulin glargine Injectable (LANTUS) 30 Unit(s) SubCutaneous at bedtime  insulin lispro (HumaLOG) corrective regimen sliding scale   SubCutaneous three times a day before meals  insulin lispro (HumaLOG) corrective regimen sliding scale   SubCutaneous at bedtime  insulin lispro Injectable (HumaLOG) 12 Unit(s) SubCutaneous three times a day before meals  levETIRAcetam 500 milliGRAM(s) Oral two times a day  mirtazapine 30 milliGRAM(s) Oral daily  pantoprazole  Injectable 40 milliGRAM(s) IV Push two times a day  piperacillin/tazobactam IVPB.. 3.375 Gram(s) IV Intermittent every 8 hours  polyethylene glycol 3350 17 Gram(s) Oral two times a day  senna 2 Tablet(s) Oral at bedtime  tiotropium 18 MICROgram(s) Capsule 1 Capsule(s) Inhalation daily  vancomycin  IVPB 1000 milliGRAM(s) IV Intermittent every 12 hours      Physical Exam  Culture - Body Fluid with Gram Stain (collected 10-30-19 @ 23:13)  Source: .Body Fluid Right psoas collection  Gram Stain (10-31-19 @ 01:21):    Numerous polymorphonuclear leukocytes per low power field    No organisms seen    Culture - Fungal, Other (collected 10-30-19 @ 22:37)  Source: .Other Other  Preliminary Report (10-31-19 @ 08:44):    Testing in progress      General: Patient comfortable in bed  Vital Signs Last 12 Hrs  T(F): 98.5 (10-31-19 @ 04:37), Max: 98.5 (10-31-19 @ 04:37)  HR: 90 (10-31-19 @ 04:37) (88 - 90)  BP: 132/74 (10-31-19 @ 04:37) (132/74 - 136/75)  BP(mean): --  RR: 18 (10-31-19 @ 04:37) (17 - 18)  SpO2: 96% (10-31-19 @ 04:37) (96% - 97%)  Neck: No palpable thyroid nodules.  CVS: S1S2, No murmurs  Respiratory: No wheezing, no crepitations  GI: Abdomen soft, bowel sounds positive  Musculoskeletal:  edema lower extremities.   Skin: No skin rashes, no ecchymosis    Diagnostics

## 2019-10-31 NOTE — PROGRESS NOTE ADULT - SUBJECTIVE AND OBJECTIVE BOX
Patient is a 63y old  Male who presents with a chief complaint of Back pain (22 Oct 2019 08:44)      SUBJECTIVE / OVERNIGHT EVENTS:  No new events, Afebrile  S/P  drainage of psoas abscess   Review of Systems:   CONSTITUTIONAL: No fever, weight loss, or fatigue  EYES: No eye pain, visual disturbances, or discharge  ENMT:  No difficulty hearing, tinnitus, vertigo; No sinus or throat pain  NECK: No pain or stiffness  BREASTS: No pain, masses, or nipple discharge  RESPIRATORY: No cough, wheezing, chills or hemoptysis; No shortness of breath  CARDIOVASCULAR: No chest pain, palpitations, dizziness, or leg swelling  GASTROINTESTINAL: No abdominal or epigastric pain. No nausea, vomiting, or hematemesis; No diarrhea or constipation. No melena or hematochezia.  GENITOURINARY: No dysuria, frequency, hematuria, or incontinence  NEUROLOGICAL: No headaches, memory loss, loss of strength, numbness, or tremors  SKIN: No itching, burning, rashes, or lesions   LYMPH NODES: No enlarged glands  ENDOCRINE: No heat or cold intolerance; No hair loss  MUSCULOSKELETAL: No joint pain or swelling;   PSYCHIATRIC: No depression, anxiety, mood swings, or difficulty sleeping  HEME/LYMPH: No easy bruising, or bleeding gums  ALLERY AND IMMUNOLOGIC: No hives or eczema    MEDICATIONS  (STANDING):  amLODIPine   Tablet 10 milliGRAM(s) Oral daily  apixaban 5 milliGRAM(s) Oral every 12 hours  chlorhexidine 4% Liquid 1 Application(s) Topical <User Schedule>  dextrose 5%. 1000 milliLiter(s) (50 mL/Hr) IV Continuous <Continuous>  dextrose 50% Injectable 12.5 Gram(s) IV Push once  dextrose 50% Injectable 25 Gram(s) IV Push once  dextrose 50% Injectable 25 Gram(s) IV Push once  diVALproex  milliGRAM(s) Oral daily  ferrous    sulfate 325 milliGRAM(s) Oral daily  folic acid 1 milliGRAM(s) Oral daily  gabapentin 100 milliGRAM(s) Oral three times a day  insulin glargine Injectable (LANTUS) 26 Unit(s) SubCutaneous at bedtime  insulin lispro (HumaLOG) corrective regimen sliding scale   SubCutaneous three times a day before meals  insulin lispro (HumaLOG) corrective regimen sliding scale   SubCutaneous at bedtime  insulin lispro Injectable (HumaLOG) 10 Unit(s) SubCutaneous three times a day before meals  levETIRAcetam 500 milliGRAM(s) Oral two times a day  mirtazapine 30 milliGRAM(s) Oral daily  piperacillin/tazobactam IVPB.. 3.375 Gram(s) IV Intermittent every 8 hours  tiotropium 18 MICROgram(s) Capsule 1 Capsule(s) Inhalation daily  vancomycin  IVPB 1000 milliGRAM(s) IV Intermittent every 12 hours    MEDICATIONS  (PRN):  acetaminophen   Tablet .. 325 milliGRAM(s) Oral daily PRN Temp greater or equal to 38C (100.4F), Mild Pain (1 - 3)  ALBUTerol    90 MICROgram(s) HFA Inhaler 2 Puff(s) Inhalation every 6 hours PRN Shortness of Breath and/or Wheezing  dextrose 40% Gel 15 Gram(s) Oral once PRN Blood Glucose LESS THAN 70 milliGRAM(s)/deciliter  glucagon  Injectable 1 milliGRAM(s) IntraMuscular once PRN Glucose LESS THAN 70 milligrams/deciliter  HYDROmorphone  Injectable 1 milliGRAM(s) IV Push every 4 hours PRN Severe Pain (7 - 10)  oxyCODONE    5 mG/acetaminophen 325 mG 2 Tablet(s) Oral every 4 hours PRN Moderate Pain (4 - 6)  sodium chloride 0.9% lock flush 10 milliLiter(s) IV Push every 1 hour PRN Pre/post blood products, medications, blood draw, and to maintain line patency      PHYSICAL EXAM:  Vital Signs Last 24 Hrs  T(C): 36.8 (22 Oct 2019 09:12), Max: 37.3 (21 Oct 2019 15:24)  T(F): 98.2 (22 Oct 2019 09:12), Max: 99.1 (21 Oct 2019 15:24)  HR: 89 (22 Oct 2019 09:12) (88 - 99)  BP: 162/87 (22 Oct 2019 09:12) (144/74 - 162/87)  BP(mean): --  RR: 18 (22 Oct 2019 09:12) (18 - 18)  SpO2: 98% (22 Oct 2019 09:12) (96% - 98%)  I&O's Summary    21 Oct 2019 07:01  -  22 Oct 2019 07:00  --------------------------------------------------------  IN: 720 mL / OUT: 650 mL / NET: 70 mL    22 Oct 2019 07:01  -  22 Oct 2019 12:52  --------------------------------------------------------  IN: 600 mL / OUT: 450 mL / NET: 150 mL      GENERAL: NAD, well-developed  HEAD:  Atraumatic, Normocephalic  EYES: EOMI, PERRLA, conjunctiva and sclera clear  NECK: Supple, No JVD  CHEST/LUNG: Clear to auscultation bilaterally; No wheeze  HEART: Regular rate and rhythm; No murmurs, rubs, or gallops  ABDOMEN: Soft, Nontender, Nondistended; Bowel sounds present  EXTREMITIES:  elda TMA  PSYCH: AAOx3  NEUROLOGY: non-focal  SKIN: No rashes or lesions    LABS:  CAPILLARY BLOOD GLUCOSE      POCT Blood Glucose.: 157 mg/dL (22 Oct 2019 11:59)  POCT Blood Glucose.: 280 mg/dL (22 Oct 2019 08:57)  POCT Blood Glucose.: 299 mg/dL (21 Oct 2019 22:05)  POCT Blood Glucose.: 260 mg/dL (21 Oct 2019 17:11)                          8.3    9.26  )-----------( 401      ( 21 Oct 2019 09:40 )             26.8     10-21    135  |  100  |  11  ----------------------------<  184<H>  3.9   |  25  |  1.16    Ca    9.2      21 Oct 2019 09:40    TPro  8.9<H>  /  Alb  3.2<L>  /  TBili  0.3  /  DBili  x   /  AST  13  /  ALT  7<L>  /  AlkPhos  66  10-21    PT/INR - ( 21 Oct 2019 09:40 )   PT: 12.6 sec;   INR: 1.09 ratio         PTT - ( 21 Oct 2019 09:40 )  PTT:30.3 sec          RADIOLOGY & ADDITIONAL TESTS:    Imaging Personally Reviewed:    Consultant(s) Notes Reviewed:      Care Discussed with Consultants/Other Providers:

## 2019-11-01 LAB
GLUCOSE BLDC GLUCOMTR-MCNC: 133 MG/DL — HIGH (ref 70–99)
GLUCOSE BLDC GLUCOMTR-MCNC: 137 MG/DL — HIGH (ref 70–99)
GLUCOSE BLDC GLUCOMTR-MCNC: 166 MG/DL — HIGH (ref 70–99)
GLUCOSE BLDC GLUCOMTR-MCNC: 96 MG/DL — SIGNIFICANT CHANGE UP (ref 70–99)
NON-GYNECOLOGICAL CYTOLOGY STUDY: SIGNIFICANT CHANGE UP
VANCOMYCIN TROUGH SERPL-MCNC: 13.3 UG/ML — SIGNIFICANT CHANGE UP (ref 10–20)

## 2019-11-01 PROCEDURE — 99232 SBSQ HOSP IP/OBS MODERATE 35: CPT

## 2019-11-01 RX ADMIN — OXYCODONE AND ACETAMINOPHEN 2 TABLET(S): 5; 325 TABLET ORAL at 00:33

## 2019-11-01 RX ADMIN — INSULIN GLARGINE 30 UNIT(S): 100 INJECTION, SOLUTION SUBCUTANEOUS at 22:16

## 2019-11-01 RX ADMIN — OXYCODONE AND ACETAMINOPHEN 2 TABLET(S): 5; 325 TABLET ORAL at 15:10

## 2019-11-01 RX ADMIN — HYDROMORPHONE HYDROCHLORIDE 1 MILLIGRAM(S): 2 INJECTION INTRAMUSCULAR; INTRAVENOUS; SUBCUTANEOUS at 20:44

## 2019-11-01 RX ADMIN — SENNA PLUS 2 TABLET(S): 8.6 TABLET ORAL at 20:44

## 2019-11-01 RX ADMIN — OXYCODONE AND ACETAMINOPHEN 2 TABLET(S): 5; 325 TABLET ORAL at 14:39

## 2019-11-01 RX ADMIN — TIOTROPIUM BROMIDE 1 CAPSULE(S): 18 CAPSULE ORAL; RESPIRATORY (INHALATION) at 12:47

## 2019-11-01 RX ADMIN — HEPARIN SODIUM 5000 UNIT(S): 5000 INJECTION INTRAVENOUS; SUBCUTANEOUS at 18:00

## 2019-11-01 RX ADMIN — MEROPENEM 100 MILLIGRAM(S): 1 INJECTION INTRAVENOUS at 20:45

## 2019-11-01 RX ADMIN — Medication 12 UNIT(S): at 08:13

## 2019-11-01 RX ADMIN — OXYCODONE AND ACETAMINOPHEN 2 TABLET(S): 5; 325 TABLET ORAL at 01:00

## 2019-11-01 RX ADMIN — Medication 1: at 12:46

## 2019-11-01 RX ADMIN — HYDROMORPHONE HYDROCHLORIDE 1 MILLIGRAM(S): 2 INJECTION INTRAMUSCULAR; INTRAVENOUS; SUBCUTANEOUS at 16:50

## 2019-11-01 RX ADMIN — HYDROMORPHONE HYDROCHLORIDE 1 MILLIGRAM(S): 2 INJECTION INTRAMUSCULAR; INTRAVENOUS; SUBCUTANEOUS at 03:27

## 2019-11-01 RX ADMIN — OXYCODONE AND ACETAMINOPHEN 2 TABLET(S): 5; 325 TABLET ORAL at 10:24

## 2019-11-01 RX ADMIN — POLYETHYLENE GLYCOL 3350 17 GRAM(S): 17 POWDER, FOR SOLUTION ORAL at 05:18

## 2019-11-01 RX ADMIN — HEPARIN SODIUM 5000 UNIT(S): 5000 INJECTION INTRAVENOUS; SUBCUTANEOUS at 05:16

## 2019-11-01 RX ADMIN — OXYCODONE AND ACETAMINOPHEN 2 TABLET(S): 5; 325 TABLET ORAL at 06:00

## 2019-11-01 RX ADMIN — PANTOPRAZOLE SODIUM 40 MILLIGRAM(S): 20 TABLET, DELAYED RELEASE ORAL at 18:01

## 2019-11-01 RX ADMIN — GABAPENTIN 300 MILLIGRAM(S): 400 CAPSULE ORAL at 05:15

## 2019-11-01 RX ADMIN — Medication 12 UNIT(S): at 18:00

## 2019-11-01 RX ADMIN — HYDROMORPHONE HYDROCHLORIDE 1 MILLIGRAM(S): 2 INJECTION INTRAMUSCULAR; INTRAVENOUS; SUBCUTANEOUS at 16:23

## 2019-11-01 RX ADMIN — OXYCODONE AND ACETAMINOPHEN 2 TABLET(S): 5; 325 TABLET ORAL at 19:32

## 2019-11-01 RX ADMIN — Medication 250 MILLIGRAM(S): at 11:21

## 2019-11-01 RX ADMIN — HYDROMORPHONE HYDROCHLORIDE 1 MILLIGRAM(S): 2 INJECTION INTRAMUSCULAR; INTRAVENOUS; SUBCUTANEOUS at 04:00

## 2019-11-01 RX ADMIN — Medication 1 MILLIGRAM(S): at 12:47

## 2019-11-01 RX ADMIN — HYDROMORPHONE HYDROCHLORIDE 1 MILLIGRAM(S): 2 INJECTION INTRAMUSCULAR; INTRAVENOUS; SUBCUTANEOUS at 12:17

## 2019-11-01 RX ADMIN — OXYCODONE AND ACETAMINOPHEN 2 TABLET(S): 5; 325 TABLET ORAL at 20:45

## 2019-11-01 RX ADMIN — Medication 325 MILLIGRAM(S): at 12:48

## 2019-11-01 RX ADMIN — HYDROMORPHONE HYDROCHLORIDE 1 MILLIGRAM(S): 2 INJECTION INTRAMUSCULAR; INTRAVENOUS; SUBCUTANEOUS at 12:50

## 2019-11-01 RX ADMIN — GABAPENTIN 300 MILLIGRAM(S): 400 CAPSULE ORAL at 20:44

## 2019-11-01 RX ADMIN — Medication 250 MILLIGRAM(S): at 00:35

## 2019-11-01 RX ADMIN — Medication 12 UNIT(S): at 12:46

## 2019-11-01 RX ADMIN — LEVETIRACETAM 500 MILLIGRAM(S): 250 TABLET, FILM COATED ORAL at 05:15

## 2019-11-01 RX ADMIN — CHLORHEXIDINE GLUCONATE 1 APPLICATION(S): 213 SOLUTION TOPICAL at 12:47

## 2019-11-01 RX ADMIN — OXYCODONE AND ACETAMINOPHEN 2 TABLET(S): 5; 325 TABLET ORAL at 05:13

## 2019-11-01 RX ADMIN — LEVETIRACETAM 500 MILLIGRAM(S): 250 TABLET, FILM COATED ORAL at 18:00

## 2019-11-01 RX ADMIN — HYDROMORPHONE HYDROCHLORIDE 1 MILLIGRAM(S): 2 INJECTION INTRAMUSCULAR; INTRAVENOUS; SUBCUTANEOUS at 08:12

## 2019-11-01 RX ADMIN — AMLODIPINE BESYLATE 10 MILLIGRAM(S): 2.5 TABLET ORAL at 05:14

## 2019-11-01 RX ADMIN — MEROPENEM 100 MILLIGRAM(S): 1 INJECTION INTRAVENOUS at 05:18

## 2019-11-01 RX ADMIN — DIVALPROEX SODIUM 500 MILLIGRAM(S): 500 TABLET, DELAYED RELEASE ORAL at 12:47

## 2019-11-01 RX ADMIN — CHLORHEXIDINE GLUCONATE 1 APPLICATION(S): 213 SOLUTION TOPICAL at 05:18

## 2019-11-01 RX ADMIN — POLYETHYLENE GLYCOL 3350 17 GRAM(S): 17 POWDER, FOR SOLUTION ORAL at 18:00

## 2019-11-01 RX ADMIN — HYDROMORPHONE HYDROCHLORIDE 1 MILLIGRAM(S): 2 INJECTION INTRAMUSCULAR; INTRAVENOUS; SUBCUTANEOUS at 22:07

## 2019-11-01 RX ADMIN — MIRTAZAPINE 30 MILLIGRAM(S): 45 TABLET, ORALLY DISINTEGRATING ORAL at 20:44

## 2019-11-01 RX ADMIN — GABAPENTIN 300 MILLIGRAM(S): 400 CAPSULE ORAL at 12:53

## 2019-11-01 RX ADMIN — HYDROMORPHONE HYDROCHLORIDE 1 MILLIGRAM(S): 2 INJECTION INTRAMUSCULAR; INTRAVENOUS; SUBCUTANEOUS at 08:50

## 2019-11-01 RX ADMIN — PANTOPRAZOLE SODIUM 40 MILLIGRAM(S): 20 TABLET, DELAYED RELEASE ORAL at 05:17

## 2019-11-01 RX ADMIN — MEROPENEM 100 MILLIGRAM(S): 1 INJECTION INTRAVENOUS at 14:39

## 2019-11-01 RX ADMIN — OXYCODONE AND ACETAMINOPHEN 2 TABLET(S): 5; 325 TABLET ORAL at 11:10

## 2019-11-01 NOTE — PROGRESS NOTE ADULT - SUBJECTIVE AND OBJECTIVE BOX
63y old  Male who presents with a chief complaint of Back pain (01 Nov 2019 13:07)      Interval history/ROS:  Afebrile, no cough, no SOB, no N/V/D, no abdominal pain, denies dysuria. Complains of back pain, which is unchanged.       No Known Allergies      Antimicrobials:  meropenem  IVPB 1000 milliGRAM(s) IV Intermittent every 8 hours  vancomycin  IVPB 1000 milliGRAM(s) IV Intermittent every 12 hours      Vital Signs Last 24 Hrs  T(C): 37.2 (11-01-19 @ 20:06), Max: 37.2 (11-01-19 @ 20:06)  T(F): 98.9 (11-01-19 @ 20:06), Max: 98.9 (11-01-19 @ 20:06)  HR: 94 (11-01-19 @ 20:06) (86 - 105)  BP: 129/56 (11-01-19 @ 20:06) (119/69 - 165/84)  BP(mean): --  RR: 18 (11-01-19 @ 20:06) (17 - 18)  SpO2: 94% (11-01-19 @ 20:06) (94% - 97%)      PHYSICAL EXAM:  Patient in no acute distress. Alert, awake. Laying in bed.   Cardiovascular: S1S2 normal.  Lungs: + air entry B/L lung fields.  Gastrointestinal: soft, nontender, nondistended.  Extremities: no edema.  + PICC   b/l foot ulcers with dressing                           9.0    8.20  )-----------( 225      ( 31 Oct 2019 12:11 )             28.8   10-31    134<L>  |  94<L>  |  14  ----------------------------<  183<H>  3.9   |  25  |  1.13    Ca    9.9      31 Oct 2019 07:12    TPro  8.8<H>  /  Alb  3.4  /  TBili  0.3  /  DBili  x   /  AST  11  /  ALT  6<L>  /  AlkPhos  60  10-31      LIVER FUNCTIONS - ( 31 Oct 2019 07:12 )  Alb: 3.4 g/dL / Pro: 8.8 g/dL / ALK PHOS: 60 U/L / ALT: 6 U/L / AST: 11 U/L / GGT: x               Culture - Acid Fast - Other w/Smear (collected 30 Oct 2019 23:21)  Source: .Other psoas muscle collection    Culture - Body Fluid with Gram Stain (collected 30 Oct 2019 23:13)  Source: .Body Fluid Right psoas collection  Gram Stain (31 Oct 2019 01:21):    Numerous polymorphonuclear leukocytes per low power field    No organisms seen  Preliminary Report (31 Oct 2019 16:36):    No growth    Culture - Fungal, Other (collected 30 Oct 2019 22:37)  Source: .Other Other  Preliminary Report (31 Oct 2019 08:44):    Testing in progress

## 2019-11-01 NOTE — PROGRESS NOTE ADULT - SUBJECTIVE AND OBJECTIVE BOX
Patient is a 63y old  Male who presents with a chief complaint of Back pain (22 Oct 2019 08:44)      SUBJECTIVE / OVERNIGHT EVENTS:  No new events, Afebrile  S/P  drainage of psoas abscess   Continues to c/o back pain  Review of Systems:   CONSTITUTIONAL: No fever, weight loss, or fatigue  EYES: No eye pain, visual disturbances, or discharge  ENMT:  No difficulty hearing, tinnitus, vertigo; No sinus or throat pain  NECK: No pain or stiffness  BREASTS: No pain, masses, or nipple discharge  RESPIRATORY: No cough, wheezing, chills or hemoptysis; No shortness of breath  CARDIOVASCULAR: No chest pain, palpitations, dizziness, or leg swelling  GASTROINTESTINAL: No abdominal or epigastric pain. No nausea, vomiting, or hematemesis; No diarrhea or constipation. No melena or hematochezia.  GENITOURINARY: No dysuria, frequency, hematuria, or incontinence  NEUROLOGICAL: No headaches, memory loss, loss of strength, numbness, or tremors  SKIN: No itching, burning, rashes, or lesions   LYMPH NODES: No enlarged glands  ENDOCRINE: No heat or cold intolerance; No hair loss  MUSCULOSKELETAL: No joint pain or swelling;   PSYCHIATRIC: No depression, anxiety, mood swings, or difficulty sleeping  HEME/LYMPH: No easy bruising, or bleeding gums  ALLERY AND IMMUNOLOGIC: No hives or eczema    MEDICATIONS  (STANDING):  amLODIPine   Tablet 10 milliGRAM(s) Oral daily  apixaban 5 milliGRAM(s) Oral every 12 hours  chlorhexidine 4% Liquid 1 Application(s) Topical <User Schedule>  dextrose 5%. 1000 milliLiter(s) (50 mL/Hr) IV Continuous <Continuous>  dextrose 50% Injectable 12.5 Gram(s) IV Push once  dextrose 50% Injectable 25 Gram(s) IV Push once  dextrose 50% Injectable 25 Gram(s) IV Push once  diVALproex  milliGRAM(s) Oral daily  ferrous    sulfate 325 milliGRAM(s) Oral daily  folic acid 1 milliGRAM(s) Oral daily  gabapentin 100 milliGRAM(s) Oral three times a day  insulin glargine Injectable (LANTUS) 26 Unit(s) SubCutaneous at bedtime  insulin lispro (HumaLOG) corrective regimen sliding scale   SubCutaneous three times a day before meals  insulin lispro (HumaLOG) corrective regimen sliding scale   SubCutaneous at bedtime  insulin lispro Injectable (HumaLOG) 10 Unit(s) SubCutaneous three times a day before meals  levETIRAcetam 500 milliGRAM(s) Oral two times a day  mirtazapine 30 milliGRAM(s) Oral daily  piperacillin/tazobactam IVPB.. 3.375 Gram(s) IV Intermittent every 8 hours  tiotropium 18 MICROgram(s) Capsule 1 Capsule(s) Inhalation daily  vancomycin  IVPB 1000 milliGRAM(s) IV Intermittent every 12 hours    MEDICATIONS  (PRN):  acetaminophen   Tablet .. 325 milliGRAM(s) Oral daily PRN Temp greater or equal to 38C (100.4F), Mild Pain (1 - 3)  ALBUTerol    90 MICROgram(s) HFA Inhaler 2 Puff(s) Inhalation every 6 hours PRN Shortness of Breath and/or Wheezing  dextrose 40% Gel 15 Gram(s) Oral once PRN Blood Glucose LESS THAN 70 milliGRAM(s)/deciliter  glucagon  Injectable 1 milliGRAM(s) IntraMuscular once PRN Glucose LESS THAN 70 milligrams/deciliter  HYDROmorphone  Injectable 1 milliGRAM(s) IV Push every 4 hours PRN Severe Pain (7 - 10)  oxyCODONE    5 mG/acetaminophen 325 mG 2 Tablet(s) Oral every 4 hours PRN Moderate Pain (4 - 6)  sodium chloride 0.9% lock flush 10 milliLiter(s) IV Push every 1 hour PRN Pre/post blood products, medications, blood draw, and to maintain line patency      PHYSICAL EXAM:  Vital Signs Last 24 Hrs  T(C): 36.8 (22 Oct 2019 09:12), Max: 37.3 (21 Oct 2019 15:24)  T(F): 98.2 (22 Oct 2019 09:12), Max: 99.1 (21 Oct 2019 15:24)  HR: 89 (22 Oct 2019 09:12) (88 - 99)  BP: 162/87 (22 Oct 2019 09:12) (144/74 - 162/87)  BP(mean): --  RR: 18 (22 Oct 2019 09:12) (18 - 18)  SpO2: 98% (22 Oct 2019 09:12) (96% - 98%)  I&O's Summary    21 Oct 2019 07:01  -  22 Oct 2019 07:00  --------------------------------------------------------  IN: 720 mL / OUT: 650 mL / NET: 70 mL    22 Oct 2019 07:01  -  22 Oct 2019 12:52  --------------------------------------------------------  IN: 600 mL / OUT: 450 mL / NET: 150 mL      GENERAL: NAD, well-developed  HEAD:  Atraumatic, Normocephalic  EYES: EOMI, PERRLA, conjunctiva and sclera clear  NECK: Supple, No JVD  CHEST/LUNG: Clear to auscultation bilaterally; No wheeze  HEART: Regular rate and rhythm; No murmurs, rubs, or gallops  ABDOMEN: Soft, Nontender, Nondistended; Bowel sounds present  EXTREMITIES:  elda TMA  PSYCH: AAOx3  NEUROLOGY: non-focal  SKIN: No rashes or lesions    LABS:  CAPILLARY BLOOD GLUCOSE      POCT Blood Glucose.: 157 mg/dL (22 Oct 2019 11:59)  POCT Blood Glucose.: 280 mg/dL (22 Oct 2019 08:57)  POCT Blood Glucose.: 299 mg/dL (21 Oct 2019 22:05)  POCT Blood Glucose.: 260 mg/dL (21 Oct 2019 17:11)                          8.3    9.26  )-----------( 401      ( 21 Oct 2019 09:40 )             26.8     10-21    135  |  100  |  11  ----------------------------<  184<H>  3.9   |  25  |  1.16    Ca    9.2      21 Oct 2019 09:40    TPro  8.9<H>  /  Alb  3.2<L>  /  TBili  0.3  /  DBili  x   /  AST  13  /  ALT  7<L>  /  AlkPhos  66  10-21    PT/INR - ( 21 Oct 2019 09:40 )   PT: 12.6 sec;   INR: 1.09 ratio         PTT - ( 21 Oct 2019 09:40 )  PTT:30.3 sec          RADIOLOGY & ADDITIONAL TESTS:    Imaging Personally Reviewed:    Consultant(s) Notes Reviewed:      Care Discussed with Consultants/Other Providers:

## 2019-11-01 NOTE — PROGRESS NOTE ADULT - ASSESSMENT
Assessment  DMT2: 63y Male with DM T2 with hyperglycemia was on oral meds at home, now on insulin, FS improved and trending within acceptable range, no hypoglycemic episode, s/p abscess drainage, appears comfortable, eating meals.  LE Abscess: S/P IR drainage of abscess, on IV ABx, afebrile, stable, monitored.  HTN: Controlled,  on antihypertensive medications.  GIB: s/p egd/colonoscopy + gastritis, FU GI        Jose Alatorre MD  Cell: [phone]  Office: [phone] Assessment  DMT2: 63y Male with DM T2 with hyperglycemia was on oral meds at home, now on insulin,  FS improved and trending within acceptable range, no hypoglycemic episode, s/p abscess drainage, appears comfortable, eating meals.  LE Abscess: S/P IR drainage of abscess, on IV ABx, afebrile, stable, monitored.  HTN: Controlled,  on antihypertensive medications.  GIB: s/p egd/colonoscopy + gastritis, FU GI        Jose Alatorre MD  Cell: [phone]  Office: [phone]

## 2019-11-01 NOTE — PROGRESS NOTE ADULT - SUBJECTIVE AND OBJECTIVE BOX
Chief complaint  Patient is a 63y old  Male who presents with a chief complaint of Back pain (31 Oct 2019 21:13)   Review of systems  Patient in bed, looks comfortable, no fever, no hypoglycemia.    Labs and Fingersticks  CAPILLARY BLOOD GLUCOSE      POCT Blood Glucose.: 166 mg/dL (01 Nov 2019 12:26)  POCT Blood Glucose.: 137 mg/dL (01 Nov 2019 08:08)  POCT Blood Glucose.: 113 mg/dL (31 Oct 2019 22:43)  POCT Blood Glucose.: 89 mg/dL (31 Oct 2019 21:55)  POCT Blood Glucose.: 129 mg/dL (31 Oct 2019 17:11)      Anion Gap, Serum: 15 (10-31 @ 07:12)      Calcium, Total Serum: 9.9 (10-31 @ 07:12)  Albumin, Serum: 3.4 (10-31 @ 07:12)    Alanine Aminotransferase (ALT/SGPT): 6 <L> (10-31 @ 07:12)  Alkaline Phosphatase, Serum: 60 (10-31 @ 07:12)  Aspartate Aminotransferase (AST/SGOT): 11 (10-31 @ 07:12)        10-31    134<L>  |  94<L>  |  14  ----------------------------<  183<H>  3.9   |  25  |  1.13    Ca    9.9      31 Oct 2019 07:12    TPro  8.8<H>  /  Alb  3.4  /  TBili  0.3  /  DBili  x   /  AST  11  /  ALT  6<L>  /  AlkPhos  60  10-31                        9.0    8.20  )-----------( 225      ( 31 Oct 2019 12:11 )             28.8     Medications  MEDICATIONS  (STANDING):  amLODIPine   Tablet 10 milliGRAM(s) Oral daily  chlorhexidine 2% Cloths 1 Application(s) Topical daily  chlorhexidine 4% Liquid 1 Application(s) Topical <User Schedule>  dextrose 5%. 1000 milliLiter(s) (50 mL/Hr) IV Continuous <Continuous>  dextrose 50% Injectable 12.5 Gram(s) IV Push once  dextrose 50% Injectable 25 Gram(s) IV Push once  dextrose 50% Injectable 25 Gram(s) IV Push once  diVALproex  milliGRAM(s) Oral daily  ferrous    sulfate 325 milliGRAM(s) Oral daily  folic acid 1 milliGRAM(s) Oral daily  gabapentin 300 milliGRAM(s) Oral three times a day  heparin  Injectable 5000 Unit(s) SubCutaneous two times a day  insulin glargine Injectable (LANTUS) 30 Unit(s) SubCutaneous at bedtime  insulin lispro (HumaLOG) corrective regimen sliding scale   SubCutaneous three times a day before meals  insulin lispro (HumaLOG) corrective regimen sliding scale   SubCutaneous at bedtime  insulin lispro Injectable (HumaLOG) 12 Unit(s) SubCutaneous three times a day before meals  levETIRAcetam 500 milliGRAM(s) Oral two times a day  meropenem  IVPB 1000 milliGRAM(s) IV Intermittent every 8 hours  mirtazapine 30 milliGRAM(s) Oral daily  pantoprazole  Injectable 40 milliGRAM(s) IV Push two times a day  polyethylene glycol 3350 17 Gram(s) Oral two times a day  senna 2 Tablet(s) Oral at bedtime  tiotropium 18 MICROgram(s) Capsule 1 Capsule(s) Inhalation daily  vancomycin  IVPB 1000 milliGRAM(s) IV Intermittent every 12 hours      Physical Exam  General: Patient comfortable in bed  Vital Signs Last 12 Hrs  T(F): 98 (11-01-19 @ 09:12), Max: 98 (11-01-19 @ 09:12)  HR: 89 (11-01-19 @ 09:12) (86 - 89)  BP: 165/84 (11-01-19 @ 09:12) (137/81 - 165/84)  BP(mean): --  RR: 18 (11-01-19 @ 09:12) (17 - 18)  SpO2: 94% (11-01-19 @ 09:12) (94% - 97%)  Neck: No palpable thyroid nodules.  CVS: S1S2, No murmurs  Respiratory: No wheezing, no crepitations  GI: Abdomen soft, bowel sounds positive  Musculoskeletal:  edema lower extremities.   Skin: No skin rashes, no ecchymosis    Diagnostics Chief complaint  Patient is a 63y old  Male who presents with a chief complaint of Back pain (31 Oct 2019 21:13)   Review of systems  Patient in bed, looks comfortable, no fever,  no hypoglycemia.    Labs and Fingersticks  CAPILLARY BLOOD GLUCOSE      POCT Blood Glucose.: 166 mg/dL (01 Nov 2019 12:26)  POCT Blood Glucose.: 137 mg/dL (01 Nov 2019 08:08)  POCT Blood Glucose.: 113 mg/dL (31 Oct 2019 22:43)  POCT Blood Glucose.: 89 mg/dL (31 Oct 2019 21:55)  POCT Blood Glucose.: 129 mg/dL (31 Oct 2019 17:11)      Anion Gap, Serum: 15 (10-31 @ 07:12)      Calcium, Total Serum: 9.9 (10-31 @ 07:12)  Albumin, Serum: 3.4 (10-31 @ 07:12)    Alanine Aminotransferase (ALT/SGPT): 6 <L> (10-31 @ 07:12)  Alkaline Phosphatase, Serum: 60 (10-31 @ 07:12)  Aspartate Aminotransferase (AST/SGOT): 11 (10-31 @ 07:12)        10-31    134<L>  |  94<L>  |  14  ----------------------------<  183<H>  3.9   |  25  |  1.13    Ca    9.9      31 Oct 2019 07:12    TPro  8.8<H>  /  Alb  3.4  /  TBili  0.3  /  DBili  x   /  AST  11  /  ALT  6<L>  /  AlkPhos  60  10-31                        9.0    8.20  )-----------( 225      ( 31 Oct 2019 12:11 )             28.8     Medications  MEDICATIONS  (STANDING):  amLODIPine   Tablet 10 milliGRAM(s) Oral daily  chlorhexidine 2% Cloths 1 Application(s) Topical daily  chlorhexidine 4% Liquid 1 Application(s) Topical <User Schedule>  dextrose 5%. 1000 milliLiter(s) (50 mL/Hr) IV Continuous <Continuous>  dextrose 50% Injectable 12.5 Gram(s) IV Push once  dextrose 50% Injectable 25 Gram(s) IV Push once  dextrose 50% Injectable 25 Gram(s) IV Push once  diVALproex  milliGRAM(s) Oral daily  ferrous    sulfate 325 milliGRAM(s) Oral daily  folic acid 1 milliGRAM(s) Oral daily  gabapentin 300 milliGRAM(s) Oral three times a day  heparin  Injectable 5000 Unit(s) SubCutaneous two times a day  insulin glargine Injectable (LANTUS) 30 Unit(s) SubCutaneous at bedtime  insulin lispro (HumaLOG) corrective regimen sliding scale   SubCutaneous three times a day before meals  insulin lispro (HumaLOG) corrective regimen sliding scale   SubCutaneous at bedtime  insulin lispro Injectable (HumaLOG) 12 Unit(s) SubCutaneous three times a day before meals  levETIRAcetam 500 milliGRAM(s) Oral two times a day  meropenem  IVPB 1000 milliGRAM(s) IV Intermittent every 8 hours  mirtazapine 30 milliGRAM(s) Oral daily  pantoprazole  Injectable 40 milliGRAM(s) IV Push two times a day  polyethylene glycol 3350 17 Gram(s) Oral two times a day  senna 2 Tablet(s) Oral at bedtime  tiotropium 18 MICROgram(s) Capsule 1 Capsule(s) Inhalation daily  vancomycin  IVPB 1000 milliGRAM(s) IV Intermittent every 12 hours      Physical Exam  General: Patient comfortable in bed  Vital Signs Last 12 Hrs  T(F): 98 (11-01-19 @ 09:12), Max: 98 (11-01-19 @ 09:12)  HR: 89 (11-01-19 @ 09:12) (86 - 89)  BP: 165/84 (11-01-19 @ 09:12) (137/81 - 165/84)  BP(mean): --  RR: 18 (11-01-19 @ 09:12) (17 - 18)  SpO2: 94% (11-01-19 @ 09:12) (94% - 97%)  Neck: No palpable thyroid nodules.  CVS: S1S2, No murmurs  Respiratory: No wheezing, no crepitations  GI: Abdomen soft, bowel sounds positive  Musculoskeletal:  edema lower extremities.   Skin: No skin rashes, no ecchymosis    Diagnostics

## 2019-11-01 NOTE — PROGRESS NOTE ADULT - ASSESSMENT
64 yo male with DM, HTN, and epilepsy was transferred from [facility] for possible T12-L1 discitis / osteomyelitis following a mechanical fall 5 days ago. Currently receiving IV Vanc/Zosyn for right foot osteomyelitis. Was seen at [facility] 10/20 where a CT abd/pelvis found an enlarged psoas muscle with ? abscesses with possible extension to the T12 L1 disc with a compression fracture suspicious for osteomyelitis.      Overall foot OM, concern for psoas abscess/spinal OM, elevated ESR. Uncontrolled DM.       Plan:  - s/p IR guided aspiration of purulent fluid, bacterial, fungal and AFB cx NTD, cytology pending.   - f/u BCx, NTD.   - c/w Meropenem given concern for infection.   - C/W Vanc 1g q12.   - trough therapeutic   - S/p Neurosurgery eval   - s/p podiatry eval.   - Positive Hep C serology, viral load almost undetectable.   - will decide duration and abx based on cx and pathology

## 2019-11-02 LAB
ANION GAP SERPL CALC-SCNC: 10 MMOL/L — SIGNIFICANT CHANGE UP (ref 5–17)
BUN SERPL-MCNC: 17 MG/DL — SIGNIFICANT CHANGE UP (ref 7–23)
CALCIUM SERPL-MCNC: 9.6 MG/DL — SIGNIFICANT CHANGE UP (ref 8.4–10.5)
CHLORIDE SERPL-SCNC: 101 MMOL/L — SIGNIFICANT CHANGE UP (ref 96–108)
CO2 SERPL-SCNC: 23 MMOL/L — SIGNIFICANT CHANGE UP (ref 22–31)
CREAT SERPL-MCNC: 1.12 MG/DL — SIGNIFICANT CHANGE UP (ref 0.5–1.3)
GLUCOSE BLDC GLUCOMTR-MCNC: 121 MG/DL — HIGH (ref 70–99)
GLUCOSE BLDC GLUCOMTR-MCNC: 130 MG/DL — HIGH (ref 70–99)
GLUCOSE BLDC GLUCOMTR-MCNC: 156 MG/DL — HIGH (ref 70–99)
GLUCOSE BLDC GLUCOMTR-MCNC: 172 MG/DL — HIGH (ref 70–99)
GLUCOSE SERPL-MCNC: 140 MG/DL — HIGH (ref 70–99)
HCT VFR BLD CALC: 26.5 % — LOW (ref 39–50)
HGB BLD-MCNC: 8.3 G/DL — LOW (ref 13–17)
MCHC RBC-ENTMCNC: 28.3 PG — SIGNIFICANT CHANGE UP (ref 27–34)
MCHC RBC-ENTMCNC: 31.3 GM/DL — LOW (ref 32–36)
MCV RBC AUTO: 90.4 FL — SIGNIFICANT CHANGE UP (ref 80–100)
PLATELET # BLD AUTO: 226 K/UL — SIGNIFICANT CHANGE UP (ref 150–400)
POTASSIUM SERPL-MCNC: 4.4 MMOL/L — SIGNIFICANT CHANGE UP (ref 3.5–5.3)
POTASSIUM SERPL-SCNC: 4.4 MMOL/L — SIGNIFICANT CHANGE UP (ref 3.5–5.3)
RBC # BLD: 2.93 M/UL — LOW (ref 4.2–5.8)
RBC # FLD: 14.3 % — SIGNIFICANT CHANGE UP (ref 10.3–14.5)
SODIUM SERPL-SCNC: 134 MMOL/L — LOW (ref 135–145)
VANCOMYCIN TROUGH SERPL-MCNC: 16.6 UG/ML — SIGNIFICANT CHANGE UP (ref 10–20)
WBC # BLD: 6.61 K/UL — SIGNIFICANT CHANGE UP (ref 3.8–10.5)
WBC # FLD AUTO: 6.61 K/UL — SIGNIFICANT CHANGE UP (ref 3.8–10.5)

## 2019-11-02 RX ADMIN — HYDROMORPHONE HYDROCHLORIDE 1 MILLIGRAM(S): 2 INJECTION INTRAMUSCULAR; INTRAVENOUS; SUBCUTANEOUS at 06:00

## 2019-11-02 RX ADMIN — HYDROMORPHONE HYDROCHLORIDE 1 MILLIGRAM(S): 2 INJECTION INTRAMUSCULAR; INTRAVENOUS; SUBCUTANEOUS at 09:08

## 2019-11-02 RX ADMIN — OXYCODONE AND ACETAMINOPHEN 2 TABLET(S): 5; 325 TABLET ORAL at 11:49

## 2019-11-02 RX ADMIN — Medication 250 MILLIGRAM(S): at 11:41

## 2019-11-02 RX ADMIN — CHLORHEXIDINE GLUCONATE 1 APPLICATION(S): 213 SOLUTION TOPICAL at 11:40

## 2019-11-02 RX ADMIN — POLYETHYLENE GLYCOL 3350 17 GRAM(S): 17 POWDER, FOR SOLUTION ORAL at 04:53

## 2019-11-02 RX ADMIN — MEROPENEM 100 MILLIGRAM(S): 1 INJECTION INTRAVENOUS at 21:36

## 2019-11-02 RX ADMIN — Medication 250 MILLIGRAM(S): at 00:45

## 2019-11-02 RX ADMIN — HYDROMORPHONE HYDROCHLORIDE 1 MILLIGRAM(S): 2 INJECTION INTRAMUSCULAR; INTRAVENOUS; SUBCUTANEOUS at 13:55

## 2019-11-02 RX ADMIN — MEROPENEM 100 MILLIGRAM(S): 1 INJECTION INTRAVENOUS at 13:34

## 2019-11-02 RX ADMIN — HYDROMORPHONE HYDROCHLORIDE 1 MILLIGRAM(S): 2 INJECTION INTRAMUSCULAR; INTRAVENOUS; SUBCUTANEOUS at 00:45

## 2019-11-02 RX ADMIN — PANTOPRAZOLE SODIUM 40 MILLIGRAM(S): 20 TABLET, DELAYED RELEASE ORAL at 17:16

## 2019-11-02 RX ADMIN — HEPARIN SODIUM 5000 UNIT(S): 5000 INJECTION INTRAVENOUS; SUBCUTANEOUS at 04:53

## 2019-11-02 RX ADMIN — Medication 1 MILLIGRAM(S): at 11:40

## 2019-11-02 RX ADMIN — CHLORHEXIDINE GLUCONATE 1 APPLICATION(S): 213 SOLUTION TOPICAL at 04:54

## 2019-11-02 RX ADMIN — HYDROMORPHONE HYDROCHLORIDE 1 MILLIGRAM(S): 2 INJECTION INTRAMUSCULAR; INTRAVENOUS; SUBCUTANEOUS at 13:34

## 2019-11-02 RX ADMIN — AMLODIPINE BESYLATE 10 MILLIGRAM(S): 2.5 TABLET ORAL at 04:52

## 2019-11-02 RX ADMIN — DIVALPROEX SODIUM 500 MILLIGRAM(S): 500 TABLET, DELAYED RELEASE ORAL at 11:41

## 2019-11-02 RX ADMIN — MIRTAZAPINE 30 MILLIGRAM(S): 45 TABLET, ORALLY DISINTEGRATING ORAL at 21:35

## 2019-11-02 RX ADMIN — GABAPENTIN 300 MILLIGRAM(S): 400 CAPSULE ORAL at 21:36

## 2019-11-02 RX ADMIN — Medication 12 UNIT(S): at 12:13

## 2019-11-02 RX ADMIN — Medication 12 UNIT(S): at 17:28

## 2019-11-02 RX ADMIN — GABAPENTIN 300 MILLIGRAM(S): 400 CAPSULE ORAL at 13:34

## 2019-11-02 RX ADMIN — OXYCODONE AND ACETAMINOPHEN 2 TABLET(S): 5; 325 TABLET ORAL at 02:58

## 2019-11-02 RX ADMIN — OXYCODONE AND ACETAMINOPHEN 2 TABLET(S): 5; 325 TABLET ORAL at 12:49

## 2019-11-02 RX ADMIN — HYDROMORPHONE HYDROCHLORIDE 1 MILLIGRAM(S): 2 INJECTION INTRAMUSCULAR; INTRAVENOUS; SUBCUTANEOUS at 09:32

## 2019-11-02 RX ADMIN — Medication 1: at 17:28

## 2019-11-02 RX ADMIN — HYDROMORPHONE HYDROCHLORIDE 1 MILLIGRAM(S): 2 INJECTION INTRAMUSCULAR; INTRAVENOUS; SUBCUTANEOUS at 01:30

## 2019-11-02 RX ADMIN — OXYCODONE AND ACETAMINOPHEN 2 TABLET(S): 5; 325 TABLET ORAL at 03:38

## 2019-11-02 RX ADMIN — TIOTROPIUM BROMIDE 1 CAPSULE(S): 18 CAPSULE ORAL; RESPIRATORY (INHALATION) at 11:40

## 2019-11-02 RX ADMIN — HYDROMORPHONE HYDROCHLORIDE 1 MILLIGRAM(S): 2 INJECTION INTRAMUSCULAR; INTRAVENOUS; SUBCUTANEOUS at 21:46

## 2019-11-02 RX ADMIN — HEPARIN SODIUM 5000 UNIT(S): 5000 INJECTION INTRAVENOUS; SUBCUTANEOUS at 17:16

## 2019-11-02 RX ADMIN — OXYCODONE AND ACETAMINOPHEN 2 TABLET(S): 5; 325 TABLET ORAL at 19:42

## 2019-11-02 RX ADMIN — Medication 1: at 12:12

## 2019-11-02 RX ADMIN — MEROPENEM 100 MILLIGRAM(S): 1 INJECTION INTRAVENOUS at 04:53

## 2019-11-02 RX ADMIN — GABAPENTIN 300 MILLIGRAM(S): 400 CAPSULE ORAL at 04:53

## 2019-11-02 RX ADMIN — HYDROMORPHONE HYDROCHLORIDE 1 MILLIGRAM(S): 2 INJECTION INTRAMUSCULAR; INTRAVENOUS; SUBCUTANEOUS at 17:46

## 2019-11-02 RX ADMIN — HYDROMORPHONE HYDROCHLORIDE 1 MILLIGRAM(S): 2 INJECTION INTRAMUSCULAR; INTRAVENOUS; SUBCUTANEOUS at 04:52

## 2019-11-02 RX ADMIN — HYDROMORPHONE HYDROCHLORIDE 1 MILLIGRAM(S): 2 INJECTION INTRAMUSCULAR; INTRAVENOUS; SUBCUTANEOUS at 22:23

## 2019-11-02 RX ADMIN — Medication 12 UNIT(S): at 08:24

## 2019-11-02 RX ADMIN — PANTOPRAZOLE SODIUM 40 MILLIGRAM(S): 20 TABLET, DELAYED RELEASE ORAL at 04:53

## 2019-11-02 RX ADMIN — HYDROMORPHONE HYDROCHLORIDE 1 MILLIGRAM(S): 2 INJECTION INTRAMUSCULAR; INTRAVENOUS; SUBCUTANEOUS at 18:05

## 2019-11-02 RX ADMIN — POLYETHYLENE GLYCOL 3350 17 GRAM(S): 17 POWDER, FOR SOLUTION ORAL at 17:16

## 2019-11-02 RX ADMIN — LEVETIRACETAM 500 MILLIGRAM(S): 250 TABLET, FILM COATED ORAL at 17:16

## 2019-11-02 RX ADMIN — INSULIN GLARGINE 30 UNIT(S): 100 INJECTION, SOLUTION SUBCUTANEOUS at 21:34

## 2019-11-02 RX ADMIN — LEVETIRACETAM 500 MILLIGRAM(S): 250 TABLET, FILM COATED ORAL at 04:53

## 2019-11-02 RX ADMIN — OXYCODONE AND ACETAMINOPHEN 2 TABLET(S): 5; 325 TABLET ORAL at 20:12

## 2019-11-02 RX ADMIN — Medication 325 MILLIGRAM(S): at 11:40

## 2019-11-02 NOTE — PROGRESS NOTE ADULT - PROBLEM SELECTOR PLAN 1
Patient counseled for compliance with consistent low carb diet and exercise as tolerated outpatient.

## 2019-11-02 NOTE — PROGRESS NOTE ADULT - SUBJECTIVE AND OBJECTIVE BOX
Chief complaint  Patient is a 63y old  Male who presents with a chief complaint of Back pain (01 Nov 2019 13:17)   Review of systems  Patient in bed, looks comfortable, no fever, no hypoglycemia.    Labs and Fingersticks  CAPILLARY BLOOD GLUCOSE      POCT Blood Glucose.: 172 mg/dL (02 Nov 2019 12:07)  POCT Blood Glucose.: 121 mg/dL (02 Nov 2019 08:21)  POCT Blood Glucose.: 133 mg/dL (01 Nov 2019 21:58)      Anion Gap, Serum: 10 (11-02 @ 07:17)      Calcium, Total Serum: 9.6 (11-02 @ 07:17)          11-02    134<L>  |  101  |  17  ----------------------------<  140<H>  4.4   |  23  |  1.12    Ca    9.6      02 Nov 2019 07:17                          8.3    6.61  )-----------( 226      ( 02 Nov 2019 10:13 )             26.5     Medications  MEDICATIONS  (STANDING):  amLODIPine   Tablet 10 milliGRAM(s) Oral daily  chlorhexidine 2% Cloths 1 Application(s) Topical daily  chlorhexidine 4% Liquid 1 Application(s) Topical <User Schedule>  dextrose 5%. 1000 milliLiter(s) (50 mL/Hr) IV Continuous <Continuous>  dextrose 50% Injectable 12.5 Gram(s) IV Push once  dextrose 50% Injectable 25 Gram(s) IV Push once  dextrose 50% Injectable 25 Gram(s) IV Push once  diVALproex  milliGRAM(s) Oral daily  ferrous    sulfate 325 milliGRAM(s) Oral daily  folic acid 1 milliGRAM(s) Oral daily  gabapentin 300 milliGRAM(s) Oral three times a day  heparin  Injectable 5000 Unit(s) SubCutaneous two times a day  insulin glargine Injectable (LANTUS) 30 Unit(s) SubCutaneous at bedtime  insulin lispro (HumaLOG) corrective regimen sliding scale   SubCutaneous three times a day before meals  insulin lispro (HumaLOG) corrective regimen sliding scale   SubCutaneous at bedtime  insulin lispro Injectable (HumaLOG) 12 Unit(s) SubCutaneous three times a day before meals  levETIRAcetam 500 milliGRAM(s) Oral two times a day  meropenem  IVPB 1000 milliGRAM(s) IV Intermittent every 8 hours  mirtazapine 30 milliGRAM(s) Oral daily  pantoprazole  Injectable 40 milliGRAM(s) IV Push two times a day  polyethylene glycol 3350 17 Gram(s) Oral two times a day  senna 2 Tablet(s) Oral at bedtime  tiotropium 18 MICROgram(s) Capsule 1 Capsule(s) Inhalation daily  vancomycin  IVPB 1000 milliGRAM(s) IV Intermittent every 12 hours      Physical Exam  General: Patient comfortable in bed  Vital Signs Last 12 Hrs  T(F): 97.6 (11-02-19 @ 09:16), Max: 97.6 (11-02-19 @ 09:16)  HR: 79 (11-02-19 @ 09:16) (79 - 79)  BP: 124/73 (11-02-19 @ 09:16) (124/73 - 124/73)  BP(mean): --  RR: --  SpO2: 96% (11-02-19 @ 09:16) (96% - 96%)  Neck: No palpable thyroid nodules.  CVS: S1S2, No murmurs  Respiratory: No wheezing, no crepitations  GI: Abdomen soft, bowel sounds positive  Musculoskeletal:  edema lower extremities.   Skin: No skin rashes, no ecchymosis    Diagnostics

## 2019-11-02 NOTE — PROGRESS NOTE ADULT - ASSESSMENT
Assessment  DMT2: 63y Male with DM T2 with hyperglycemia was on oral meds at home, now on insulin, blood sugars improving, no hypoglycemic episode, s/p abscess drainage, appears comfortable, eating meals.  LE Abscess: S/P IR drainage of abscess, on IV ABx, afebrile, stable, monitored.  HTN: Controlled,  on antihypertensive medications.  GIB: s/p egd/colonoscopy + gastritis, FU GI        Jose Alatorre MD  Cell: [phone]  Office: [phone]

## 2019-11-03 LAB
GLUCOSE BLDC GLUCOMTR-MCNC: 100 MG/DL — HIGH (ref 70–99)
GLUCOSE BLDC GLUCOMTR-MCNC: 109 MG/DL — HIGH (ref 70–99)
GLUCOSE BLDC GLUCOMTR-MCNC: 225 MG/DL — HIGH (ref 70–99)
GLUCOSE BLDC GLUCOMTR-MCNC: 97 MG/DL — SIGNIFICANT CHANGE UP (ref 70–99)

## 2019-11-03 RX ORDER — INSULIN GLARGINE 100 [IU]/ML
15 INJECTION, SOLUTION SUBCUTANEOUS ONCE
Refills: 0 | Status: COMPLETED | OUTPATIENT
Start: 2019-11-03 | End: 2019-11-04

## 2019-11-03 RX ADMIN — OXYCODONE AND ACETAMINOPHEN 2 TABLET(S): 5; 325 TABLET ORAL at 15:40

## 2019-11-03 RX ADMIN — PANTOPRAZOLE SODIUM 40 MILLIGRAM(S): 20 TABLET, DELAYED RELEASE ORAL at 05:10

## 2019-11-03 RX ADMIN — Medication 12 UNIT(S): at 12:12

## 2019-11-03 RX ADMIN — LEVETIRACETAM 500 MILLIGRAM(S): 250 TABLET, FILM COATED ORAL at 17:15

## 2019-11-03 RX ADMIN — HYDROMORPHONE HYDROCHLORIDE 1 MILLIGRAM(S): 2 INJECTION INTRAMUSCULAR; INTRAVENOUS; SUBCUTANEOUS at 08:11

## 2019-11-03 RX ADMIN — OXYCODONE AND ACETAMINOPHEN 2 TABLET(S): 5; 325 TABLET ORAL at 15:00

## 2019-11-03 RX ADMIN — GABAPENTIN 300 MILLIGRAM(S): 400 CAPSULE ORAL at 05:10

## 2019-11-03 RX ADMIN — PANTOPRAZOLE SODIUM 40 MILLIGRAM(S): 20 TABLET, DELAYED RELEASE ORAL at 17:15

## 2019-11-03 RX ADMIN — Medication 250 MILLIGRAM(S): at 12:11

## 2019-11-03 RX ADMIN — HYDROMORPHONE HYDROCHLORIDE 1 MILLIGRAM(S): 2 INJECTION INTRAMUSCULAR; INTRAVENOUS; SUBCUTANEOUS at 20:49

## 2019-11-03 RX ADMIN — POLYETHYLENE GLYCOL 3350 17 GRAM(S): 17 POWDER, FOR SOLUTION ORAL at 05:09

## 2019-11-03 RX ADMIN — Medication 325 MILLIGRAM(S): at 12:11

## 2019-11-03 RX ADMIN — DIVALPROEX SODIUM 500 MILLIGRAM(S): 500 TABLET, DELAYED RELEASE ORAL at 12:12

## 2019-11-03 RX ADMIN — MIRTAZAPINE 30 MILLIGRAM(S): 45 TABLET, ORALLY DISINTEGRATING ORAL at 23:01

## 2019-11-03 RX ADMIN — GABAPENTIN 300 MILLIGRAM(S): 400 CAPSULE ORAL at 23:01

## 2019-11-03 RX ADMIN — HYDROMORPHONE HYDROCHLORIDE 1 MILLIGRAM(S): 2 INJECTION INTRAMUSCULAR; INTRAVENOUS; SUBCUTANEOUS at 12:45

## 2019-11-03 RX ADMIN — OXYCODONE AND ACETAMINOPHEN 2 TABLET(S): 5; 325 TABLET ORAL at 00:23

## 2019-11-03 RX ADMIN — OXYCODONE AND ACETAMINOPHEN 2 TABLET(S): 5; 325 TABLET ORAL at 04:52

## 2019-11-03 RX ADMIN — HYDROMORPHONE HYDROCHLORIDE 1 MILLIGRAM(S): 2 INJECTION INTRAMUSCULAR; INTRAVENOUS; SUBCUTANEOUS at 23:01

## 2019-11-03 RX ADMIN — AMLODIPINE BESYLATE 10 MILLIGRAM(S): 2.5 TABLET ORAL at 05:10

## 2019-11-03 RX ADMIN — HEPARIN SODIUM 5000 UNIT(S): 5000 INJECTION INTRAVENOUS; SUBCUTANEOUS at 05:10

## 2019-11-03 RX ADMIN — MEROPENEM 100 MILLIGRAM(S): 1 INJECTION INTRAVENOUS at 05:11

## 2019-11-03 RX ADMIN — HYDROMORPHONE HYDROCHLORIDE 1 MILLIGRAM(S): 2 INJECTION INTRAMUSCULAR; INTRAVENOUS; SUBCUTANEOUS at 17:20

## 2019-11-03 RX ADMIN — POLYETHYLENE GLYCOL 3350 17 GRAM(S): 17 POWDER, FOR SOLUTION ORAL at 17:15

## 2019-11-03 RX ADMIN — HYDROMORPHONE HYDROCHLORIDE 1 MILLIGRAM(S): 2 INJECTION INTRAMUSCULAR; INTRAVENOUS; SUBCUTANEOUS at 16:46

## 2019-11-03 RX ADMIN — GABAPENTIN 300 MILLIGRAM(S): 400 CAPSULE ORAL at 12:12

## 2019-11-03 RX ADMIN — Medication 12 UNIT(S): at 17:15

## 2019-11-03 RX ADMIN — HYDROMORPHONE HYDROCHLORIDE 1 MILLIGRAM(S): 2 INJECTION INTRAMUSCULAR; INTRAVENOUS; SUBCUTANEOUS at 12:13

## 2019-11-03 RX ADMIN — Medication 12 UNIT(S): at 08:11

## 2019-11-03 RX ADMIN — Medication 2: at 08:11

## 2019-11-03 RX ADMIN — OXYCODONE AND ACETAMINOPHEN 2 TABLET(S): 5; 325 TABLET ORAL at 10:25

## 2019-11-03 RX ADMIN — Medication 1 MILLIGRAM(S): at 12:11

## 2019-11-03 RX ADMIN — OXYCODONE AND ACETAMINOPHEN 2 TABLET(S): 5; 325 TABLET ORAL at 05:12

## 2019-11-03 RX ADMIN — OXYCODONE AND ACETAMINOPHEN 2 TABLET(S): 5; 325 TABLET ORAL at 00:42

## 2019-11-03 RX ADMIN — OXYCODONE AND ACETAMINOPHEN 2 TABLET(S): 5; 325 TABLET ORAL at 18:49

## 2019-11-03 RX ADMIN — LEVETIRACETAM 500 MILLIGRAM(S): 250 TABLET, FILM COATED ORAL at 05:10

## 2019-11-03 RX ADMIN — HYDROMORPHONE HYDROCHLORIDE 1 MILLIGRAM(S): 2 INJECTION INTRAMUSCULAR; INTRAVENOUS; SUBCUTANEOUS at 01:46

## 2019-11-03 RX ADMIN — MEROPENEM 100 MILLIGRAM(S): 1 INJECTION INTRAVENOUS at 23:34

## 2019-11-03 RX ADMIN — OXYCODONE AND ACETAMINOPHEN 2 TABLET(S): 5; 325 TABLET ORAL at 19:20

## 2019-11-03 RX ADMIN — TIOTROPIUM BROMIDE 1 CAPSULE(S): 18 CAPSULE ORAL; RESPIRATORY (INHALATION) at 12:11

## 2019-11-03 RX ADMIN — Medication 250 MILLIGRAM(S): at 00:24

## 2019-11-03 RX ADMIN — OXYCODONE AND ACETAMINOPHEN 2 TABLET(S): 5; 325 TABLET ORAL at 10:50

## 2019-11-03 RX ADMIN — HYDROMORPHONE HYDROCHLORIDE 1 MILLIGRAM(S): 2 INJECTION INTRAMUSCULAR; INTRAVENOUS; SUBCUTANEOUS at 09:00

## 2019-11-03 RX ADMIN — CHLORHEXIDINE GLUCONATE 1 APPLICATION(S): 213 SOLUTION TOPICAL at 05:11

## 2019-11-03 RX ADMIN — SENNA PLUS 2 TABLET(S): 8.6 TABLET ORAL at 23:01

## 2019-11-03 RX ADMIN — HEPARIN SODIUM 5000 UNIT(S): 5000 INJECTION INTRAVENOUS; SUBCUTANEOUS at 17:15

## 2019-11-03 RX ADMIN — HYDROMORPHONE HYDROCHLORIDE 1 MILLIGRAM(S): 2 INJECTION INTRAMUSCULAR; INTRAVENOUS; SUBCUTANEOUS at 01:10

## 2019-11-03 RX ADMIN — MEROPENEM 100 MILLIGRAM(S): 1 INJECTION INTRAVENOUS at 15:00

## 2019-11-03 NOTE — PROGRESS NOTE ADULT - SUBJECTIVE AND OBJECTIVE BOX
Chief complaint  Patient is a 63y old  Male who presents with a chief complaint of Back pain (02 Nov 2019 20:08)   Review of systems  Patient in bed, looks comfortable, no fever, no hypoglycemia.    Labs and Fingersticks  CAPILLARY BLOOD GLUCOSE      POCT Blood Glucose.: 97 mg/dL (03 Nov 2019 16:53)  POCT Blood Glucose.: 100 mg/dL (03 Nov 2019 11:48)  POCT Blood Glucose.: 225 mg/dL (03 Nov 2019 08:10)      Anion Gap, Serum: 10 (11-02 @ 07:17)      Calcium, Total Serum: 9.6 (11-02 @ 07:17)          11-02    134<L>  |  101  |  17  ----------------------------<  140<H>  4.4   |  23  |  1.12    Ca    9.6      02 Nov 2019 07:17                          8.3    6.61  )-----------( 226      ( 02 Nov 2019 10:13 )             26.5     Medications  MEDICATIONS  (STANDING):  amLODIPine   Tablet 10 milliGRAM(s) Oral daily  chlorhexidine 2% Cloths 1 Application(s) Topical daily  chlorhexidine 4% Liquid 1 Application(s) Topical <User Schedule>  dextrose 5%. 1000 milliLiter(s) (50 mL/Hr) IV Continuous <Continuous>  dextrose 50% Injectable 12.5 Gram(s) IV Push once  dextrose 50% Injectable 25 Gram(s) IV Push once  dextrose 50% Injectable 25 Gram(s) IV Push once  diVALproex  milliGRAM(s) Oral daily  ferrous    sulfate 325 milliGRAM(s) Oral daily  folic acid 1 milliGRAM(s) Oral daily  gabapentin 300 milliGRAM(s) Oral three times a day  heparin  Injectable 5000 Unit(s) SubCutaneous two times a day  insulin glargine Injectable (LANTUS) 30 Unit(s) SubCutaneous at bedtime  insulin lispro (HumaLOG) corrective regimen sliding scale   SubCutaneous three times a day before meals  insulin lispro (HumaLOG) corrective regimen sliding scale   SubCutaneous at bedtime  insulin lispro Injectable (HumaLOG) 12 Unit(s) SubCutaneous three times a day before meals  levETIRAcetam 500 milliGRAM(s) Oral two times a day  meropenem  IVPB 1000 milliGRAM(s) IV Intermittent every 8 hours  mirtazapine 30 milliGRAM(s) Oral daily  pantoprazole  Injectable 40 milliGRAM(s) IV Push two times a day  polyethylene glycol 3350 17 Gram(s) Oral two times a day  senna 2 Tablet(s) Oral at bedtime  tiotropium 18 MICROgram(s) Capsule 1 Capsule(s) Inhalation daily  vancomycin  IVPB 1000 milliGRAM(s) IV Intermittent every 12 hours      Physical Exam  General: Patient comfortable in bed  Vital Signs Last 12 Hrs  T(F): 98.5 (11-03-19 @ 18:22), Max: 98.6 (11-03-19 @ 11:11)  HR: 99 (11-03-19 @ 18:22) (81 - 99)  BP: 131/79 (11-03-19 @ 18:22) (113/68 - 143/81)  BP(mean): --  RR: 18 (11-03-19 @ 18:22) (18 - 18)  SpO2: 95% (11-03-19 @ 18:22) (95% - 98%)  Neck: No palpable thyroid nodules.  CVS: S1S2, No murmurs  Respiratory: No wheezing, no crepitations  GI: Abdomen soft, bowel sounds positive  Musculoskeletal:  edema lower extremities.   Skin: No skin rashes, no ecchymosis    Diagnostics

## 2019-11-03 NOTE — PROGRESS NOTE ADULT - ASSESSMENT
Assessment  DMT2: 63y Male with DM T2 with hyperglycemia was on oral meds at home, now on insulin, sugars improving,  no hypoglycemic episode, s/p abscess drainage, appears comfortable, eating meals.  LE Abscess: S/P IR drainage of abscess, on IV ABx, afebrile, stable, monitored.  HTN: Controlled,  on antihypertensive medications.  GIB: s/p egd/colonoscopy + gastritis, FU GI        Jose Alatorre MD  Cell: [phone]  Office: [phone]

## 2019-11-04 LAB
ANION GAP SERPL CALC-SCNC: 12 MMOL/L — SIGNIFICANT CHANGE UP (ref 5–17)
BUN SERPL-MCNC: 18 MG/DL — SIGNIFICANT CHANGE UP (ref 7–23)
CALCIUM SERPL-MCNC: 9.9 MG/DL — SIGNIFICANT CHANGE UP (ref 8.4–10.5)
CHLORIDE SERPL-SCNC: 99 MMOL/L — SIGNIFICANT CHANGE UP (ref 96–108)
CO2 SERPL-SCNC: 24 MMOL/L — SIGNIFICANT CHANGE UP (ref 22–31)
CREAT SERPL-MCNC: 1.1 MG/DL — SIGNIFICANT CHANGE UP (ref 0.5–1.3)
CULTURE RESULTS: SIGNIFICANT CHANGE UP
GLUCOSE BLDC GLUCOMTR-MCNC: 136 MG/DL — HIGH (ref 70–99)
GLUCOSE BLDC GLUCOMTR-MCNC: 146 MG/DL — HIGH (ref 70–99)
GLUCOSE BLDC GLUCOMTR-MCNC: 184 MG/DL — HIGH (ref 70–99)
GLUCOSE BLDC GLUCOMTR-MCNC: 204 MG/DL — HIGH (ref 70–99)
GLUCOSE BLDC GLUCOMTR-MCNC: 96 MG/DL — SIGNIFICANT CHANGE UP (ref 70–99)
GLUCOSE SERPL-MCNC: 140 MG/DL — HIGH (ref 70–99)
HCT VFR BLD CALC: 27.4 % — LOW (ref 39–50)
HGB BLD-MCNC: 8.6 G/DL — LOW (ref 13–17)
MCHC RBC-ENTMCNC: 27.8 PG — SIGNIFICANT CHANGE UP (ref 27–34)
MCHC RBC-ENTMCNC: 31.4 GM/DL — LOW (ref 32–36)
MCV RBC AUTO: 88.7 FL — SIGNIFICANT CHANGE UP (ref 80–100)
PLATELET # BLD AUTO: 265 K/UL — SIGNIFICANT CHANGE UP (ref 150–400)
POTASSIUM SERPL-MCNC: 4.3 MMOL/L — SIGNIFICANT CHANGE UP (ref 3.5–5.3)
POTASSIUM SERPL-SCNC: 4.3 MMOL/L — SIGNIFICANT CHANGE UP (ref 3.5–5.3)
RBC # BLD: 3.09 M/UL — LOW (ref 4.2–5.8)
RBC # FLD: 14.1 % — SIGNIFICANT CHANGE UP (ref 10.3–14.5)
SODIUM SERPL-SCNC: 135 MMOL/L — SIGNIFICANT CHANGE UP (ref 135–145)
SPECIMEN SOURCE: SIGNIFICANT CHANGE UP
VANCOMYCIN TROUGH SERPL-MCNC: 16.3 UG/ML — SIGNIFICANT CHANGE UP (ref 10–20)
WBC # BLD: 6.13 K/UL — SIGNIFICANT CHANGE UP (ref 3.8–10.5)
WBC # FLD AUTO: 6.13 K/UL — SIGNIFICANT CHANGE UP (ref 3.8–10.5)

## 2019-11-04 PROCEDURE — 99233 SBSQ HOSP IP/OBS HIGH 50: CPT

## 2019-11-04 RX ORDER — COLLAGENASE CLOSTRIDIUM HIST. 250 UNIT/G
1 OINTMENT (GRAM) TOPICAL DAILY
Refills: 0 | Status: DISCONTINUED | OUTPATIENT
Start: 2019-11-04 | End: 2019-11-08

## 2019-11-04 RX ADMIN — LEVETIRACETAM 500 MILLIGRAM(S): 250 TABLET, FILM COATED ORAL at 05:24

## 2019-11-04 RX ADMIN — Medication 12 UNIT(S): at 12:13

## 2019-11-04 RX ADMIN — HYDROMORPHONE HYDROCHLORIDE 1 MILLIGRAM(S): 2 INJECTION INTRAMUSCULAR; INTRAVENOUS; SUBCUTANEOUS at 14:03

## 2019-11-04 RX ADMIN — Medication 250 MILLIGRAM(S): at 12:13

## 2019-11-04 RX ADMIN — DIVALPROEX SODIUM 500 MILLIGRAM(S): 500 TABLET, DELAYED RELEASE ORAL at 11:57

## 2019-11-04 RX ADMIN — GABAPENTIN 300 MILLIGRAM(S): 400 CAPSULE ORAL at 21:40

## 2019-11-04 RX ADMIN — OXYCODONE AND ACETAMINOPHEN 2 TABLET(S): 5; 325 TABLET ORAL at 07:57

## 2019-11-04 RX ADMIN — Medication 2: at 12:14

## 2019-11-04 RX ADMIN — HYDROMORPHONE HYDROCHLORIDE 1 MILLIGRAM(S): 2 INJECTION INTRAMUSCULAR; INTRAVENOUS; SUBCUTANEOUS at 06:49

## 2019-11-04 RX ADMIN — HYDROMORPHONE HYDROCHLORIDE 1 MILLIGRAM(S): 2 INJECTION INTRAMUSCULAR; INTRAVENOUS; SUBCUTANEOUS at 09:41

## 2019-11-04 RX ADMIN — HYDROMORPHONE HYDROCHLORIDE 1 MILLIGRAM(S): 2 INJECTION INTRAMUSCULAR; INTRAVENOUS; SUBCUTANEOUS at 22:25

## 2019-11-04 RX ADMIN — OXYCODONE AND ACETAMINOPHEN 2 TABLET(S): 5; 325 TABLET ORAL at 20:02

## 2019-11-04 RX ADMIN — OXYCODONE AND ACETAMINOPHEN 2 TABLET(S): 5; 325 TABLET ORAL at 08:51

## 2019-11-04 RX ADMIN — POLYETHYLENE GLYCOL 3350 17 GRAM(S): 17 POWDER, FOR SOLUTION ORAL at 17:04

## 2019-11-04 RX ADMIN — OXYCODONE AND ACETAMINOPHEN 2 TABLET(S): 5; 325 TABLET ORAL at 20:58

## 2019-11-04 RX ADMIN — POLYETHYLENE GLYCOL 3350 17 GRAM(S): 17 POWDER, FOR SOLUTION ORAL at 05:30

## 2019-11-04 RX ADMIN — HYDROMORPHONE HYDROCHLORIDE 1 MILLIGRAM(S): 2 INJECTION INTRAMUSCULAR; INTRAVENOUS; SUBCUTANEOUS at 01:11

## 2019-11-04 RX ADMIN — INSULIN GLARGINE 15 UNIT(S): 100 INJECTION, SOLUTION SUBCUTANEOUS at 00:03

## 2019-11-04 RX ADMIN — OXYCODONE AND ACETAMINOPHEN 2 TABLET(S): 5; 325 TABLET ORAL at 15:45

## 2019-11-04 RX ADMIN — MEROPENEM 100 MILLIGRAM(S): 1 INJECTION INTRAVENOUS at 22:29

## 2019-11-04 RX ADMIN — GABAPENTIN 300 MILLIGRAM(S): 400 CAPSULE ORAL at 13:56

## 2019-11-04 RX ADMIN — HYDROMORPHONE HYDROCHLORIDE 1 MILLIGRAM(S): 2 INJECTION INTRAMUSCULAR; INTRAVENOUS; SUBCUTANEOUS at 10:07

## 2019-11-04 RX ADMIN — HYDROMORPHONE HYDROCHLORIDE 1 MILLIGRAM(S): 2 INJECTION INTRAMUSCULAR; INTRAVENOUS; SUBCUTANEOUS at 02:10

## 2019-11-04 RX ADMIN — TIOTROPIUM BROMIDE 1 CAPSULE(S): 18 CAPSULE ORAL; RESPIRATORY (INHALATION) at 11:57

## 2019-11-04 RX ADMIN — HYDROMORPHONE HYDROCHLORIDE 1 MILLIGRAM(S): 2 INJECTION INTRAMUSCULAR; INTRAVENOUS; SUBCUTANEOUS at 05:23

## 2019-11-04 RX ADMIN — CHLORHEXIDINE GLUCONATE 1 APPLICATION(S): 213 SOLUTION TOPICAL at 05:24

## 2019-11-04 RX ADMIN — Medication 12 UNIT(S): at 17:04

## 2019-11-04 RX ADMIN — Medication 1 MILLIGRAM(S): at 11:57

## 2019-11-04 RX ADMIN — Medication 325 MILLIGRAM(S): at 11:57

## 2019-11-04 RX ADMIN — HYDROMORPHONE HYDROCHLORIDE 1 MILLIGRAM(S): 2 INJECTION INTRAMUSCULAR; INTRAVENOUS; SUBCUTANEOUS at 13:44

## 2019-11-04 RX ADMIN — MEROPENEM 100 MILLIGRAM(S): 1 INJECTION INTRAVENOUS at 13:56

## 2019-11-04 RX ADMIN — HEPARIN SODIUM 5000 UNIT(S): 5000 INJECTION INTRAVENOUS; SUBCUTANEOUS at 05:25

## 2019-11-04 RX ADMIN — LEVETIRACETAM 500 MILLIGRAM(S): 250 TABLET, FILM COATED ORAL at 17:03

## 2019-11-04 RX ADMIN — GABAPENTIN 300 MILLIGRAM(S): 400 CAPSULE ORAL at 05:25

## 2019-11-04 RX ADMIN — Medication 250 MILLIGRAM(S): at 00:03

## 2019-11-04 RX ADMIN — Medication 1 APPLICATION(S): at 16:49

## 2019-11-04 RX ADMIN — OXYCODONE AND ACETAMINOPHEN 2 TABLET(S): 5; 325 TABLET ORAL at 16:37

## 2019-11-04 RX ADMIN — HYDROMORPHONE HYDROCHLORIDE 1 MILLIGRAM(S): 2 INJECTION INTRAMUSCULAR; INTRAVENOUS; SUBCUTANEOUS at 22:55

## 2019-11-04 RX ADMIN — HEPARIN SODIUM 5000 UNIT(S): 5000 INJECTION INTRAVENOUS; SUBCUTANEOUS at 17:04

## 2019-11-04 RX ADMIN — INSULIN GLARGINE 30 UNIT(S): 100 INJECTION, SOLUTION SUBCUTANEOUS at 22:25

## 2019-11-04 RX ADMIN — MIRTAZAPINE 30 MILLIGRAM(S): 45 TABLET, ORALLY DISINTEGRATING ORAL at 21:40

## 2019-11-04 RX ADMIN — MEROPENEM 100 MILLIGRAM(S): 1 INJECTION INTRAVENOUS at 05:38

## 2019-11-04 RX ADMIN — AMLODIPINE BESYLATE 10 MILLIGRAM(S): 2.5 TABLET ORAL at 05:25

## 2019-11-04 RX ADMIN — PANTOPRAZOLE SODIUM 40 MILLIGRAM(S): 20 TABLET, DELAYED RELEASE ORAL at 05:25

## 2019-11-04 NOTE — PROGRESS NOTE ADULT - SUBJECTIVE AND OBJECTIVE BOX
Chief complaint  Patient is a 63y old  Male who presents with a chief complaint of Back pain (03 Nov 2019 21:54)   Review of systems  Patient in bed, looks comfortable, no fever, no hypoglycemia.    Labs and Fingersticks  CAPILLARY BLOOD GLUCOSE      POCT Blood Glucose.: 204 mg/dL (04 Nov 2019 12:11)  POCT Blood Glucose.: 136 mg/dL (04 Nov 2019 08:23)  POCT Blood Glucose.: 146 mg/dL (04 Nov 2019 00:01)  POCT Blood Glucose.: 109 mg/dL (03 Nov 2019 21:58)  POCT Blood Glucose.: 97 mg/dL (03 Nov 2019 16:53)      Anion Gap, Serum: 12 (11-04 @ 06:49)      Calcium, Total Serum: 9.9 (11-04 @ 06:49)          11-04    135  |  99  |  18  ----------------------------<  140<H>  4.3   |  24  |  1.10    Ca    9.9      04 Nov 2019 06:49                          8.6    6.13  )-----------( 265      ( 04 Nov 2019 08:03 )             27.4     Medications  MEDICATIONS  (STANDING):  amLODIPine   Tablet 10 milliGRAM(s) Oral daily  chlorhexidine 2% Cloths 1 Application(s) Topical daily  chlorhexidine 4% Liquid 1 Application(s) Topical <User Schedule>  collagenase Ointment 1 Application(s) Topical daily  dextrose 5%. 1000 milliLiter(s) (50 mL/Hr) IV Continuous <Continuous>  dextrose 50% Injectable 12.5 Gram(s) IV Push once  dextrose 50% Injectable 25 Gram(s) IV Push once  dextrose 50% Injectable 25 Gram(s) IV Push once  diVALproex  milliGRAM(s) Oral daily  ferrous    sulfate 325 milliGRAM(s) Oral daily  folic acid 1 milliGRAM(s) Oral daily  gabapentin 300 milliGRAM(s) Oral three times a day  heparin  Injectable 5000 Unit(s) SubCutaneous two times a day  insulin glargine Injectable (LANTUS) 30 Unit(s) SubCutaneous at bedtime  insulin lispro (HumaLOG) corrective regimen sliding scale   SubCutaneous three times a day before meals  insulin lispro (HumaLOG) corrective regimen sliding scale   SubCutaneous at bedtime  insulin lispro Injectable (HumaLOG) 12 Unit(s) SubCutaneous three times a day before meals  levETIRAcetam 500 milliGRAM(s) Oral two times a day  meropenem  IVPB 1000 milliGRAM(s) IV Intermittent every 8 hours  mirtazapine 30 milliGRAM(s) Oral daily  pantoprazole  Injectable 40 milliGRAM(s) IV Push two times a day  polyethylene glycol 3350 17 Gram(s) Oral two times a day  senna 2 Tablet(s) Oral at bedtime  tiotropium 18 MICROgram(s) Capsule 1 Capsule(s) Inhalation daily  vancomycin  IVPB 1000 milliGRAM(s) IV Intermittent every 12 hours      Physical Exam  General: Patient comfortable in bed  Vital Signs Last 12 Hrs  T(F): 97.7 (11-04-19 @ 10:49), Max: 98.4 (11-04-19 @ 05:20)  HR: 77 (11-04-19 @ 10:49) (77 - 84)  BP: 128/83 (11-04-19 @ 10:49) (128/83 - 142/89)  BP(mean): --  RR: 18 (11-04-19 @ 10:49) (18 - 18)  SpO2: 97% (11-04-19 @ 10:49) (97% - 97%)  Neck: No palpable thyroid nodules.  CVS: S1S2, No murmurs  Respiratory: No wheezing, no crepitations  GI: Abdomen soft, bowel sounds positive  Musculoskeletal:  edema lower extremities.   Skin: No skin rashes, no ecchymosis    Diagnostics Chief complaint  Patient is a 63y old  Male who presents with a chief complaint of Back pain (03 Nov 2019 21:54)   Review of systems  Patient in bed, looks comfortable, no fever,  no hypoglycemia.    Labs and Fingersticks  CAPILLARY BLOOD GLUCOSE      POCT Blood Glucose.: 204 mg/dL (04 Nov 2019 12:11)  POCT Blood Glucose.: 136 mg/dL (04 Nov 2019 08:23)  POCT Blood Glucose.: 146 mg/dL (04 Nov 2019 00:01)  POCT Blood Glucose.: 109 mg/dL (03 Nov 2019 21:58)  POCT Blood Glucose.: 97 mg/dL (03 Nov 2019 16:53)      Anion Gap, Serum: 12 (11-04 @ 06:49)      Calcium, Total Serum: 9.9 (11-04 @ 06:49)          11-04    135  |  99  |  18  ----------------------------<  140<H>  4.3   |  24  |  1.10    Ca    9.9      04 Nov 2019 06:49                          8.6    6.13  )-----------( 265      ( 04 Nov 2019 08:03 )             27.4     Medications  MEDICATIONS  (STANDING):  amLODIPine   Tablet 10 milliGRAM(s) Oral daily  chlorhexidine 2% Cloths 1 Application(s) Topical daily  chlorhexidine 4% Liquid 1 Application(s) Topical <User Schedule>  collagenase Ointment 1 Application(s) Topical daily  dextrose 5%. 1000 milliLiter(s) (50 mL/Hr) IV Continuous <Continuous>  dextrose 50% Injectable 12.5 Gram(s) IV Push once  dextrose 50% Injectable 25 Gram(s) IV Push once  dextrose 50% Injectable 25 Gram(s) IV Push once  diVALproex  milliGRAM(s) Oral daily  ferrous    sulfate 325 milliGRAM(s) Oral daily  folic acid 1 milliGRAM(s) Oral daily  gabapentin 300 milliGRAM(s) Oral three times a day  heparin  Injectable 5000 Unit(s) SubCutaneous two times a day  insulin glargine Injectable (LANTUS) 30 Unit(s) SubCutaneous at bedtime  insulin lispro (HumaLOG) corrective regimen sliding scale   SubCutaneous three times a day before meals  insulin lispro (HumaLOG) corrective regimen sliding scale   SubCutaneous at bedtime  insulin lispro Injectable (HumaLOG) 12 Unit(s) SubCutaneous three times a day before meals  levETIRAcetam 500 milliGRAM(s) Oral two times a day  meropenem  IVPB 1000 milliGRAM(s) IV Intermittent every 8 hours  mirtazapine 30 milliGRAM(s) Oral daily  pantoprazole  Injectable 40 milliGRAM(s) IV Push two times a day  polyethylene glycol 3350 17 Gram(s) Oral two times a day  senna 2 Tablet(s) Oral at bedtime  tiotropium 18 MICROgram(s) Capsule 1 Capsule(s) Inhalation daily  vancomycin  IVPB 1000 milliGRAM(s) IV Intermittent every 12 hours      Physical Exam  General: Patient comfortable in bed  Vital Signs Last 12 Hrs  T(F): 97.7 (11-04-19 @ 10:49), Max: 98.4 (11-04-19 @ 05:20)  HR: 77 (11-04-19 @ 10:49) (77 - 84)  BP: 128/83 (11-04-19 @ 10:49) (128/83 - 142/89)  BP(mean): --  RR: 18 (11-04-19 @ 10:49) (18 - 18)  SpO2: 97% (11-04-19 @ 10:49) (97% - 97%)  Neck: No palpable thyroid nodules.  CVS: S1S2, No murmurs  Respiratory: No wheezing, no crepitations  GI: Abdomen soft, bowel sounds positive  Musculoskeletal:  edema lower extremities.   Skin: No skin rashes, no ecchymosis    Diagnostics

## 2019-11-04 NOTE — PROGRESS NOTE ADULT - ASSESSMENT
Assessment  DMT2: 63y Male with DM T2 with hyperglycemia was on oral meds at home, now on insulin, sugars improving, trending within acceptable range, no hypoglycemic episode, s/p abscess drainage, appears comfortable, eating meals.  LE Abscess: S/P IR drainage of abscess, on IV ABx, afebrile, stable, monitored.  HTN: Controlled,  on antihypertensive medications.  GIB: s/p egd/colonoscopy + gastritis, FU GI        Jose Alatorre MD  Cell: [phone]  Office: [phone] Assessment  DMT2: 63y Male with DM T2 with hyperglycemia was on oral meds at home, now on insulin, sugars improving, trending within acceptable range, no hypoglycemic episode,  s/p abscess drainage, appears comfortable, eating meals.  LE Abscess: S/P IR drainage of abscess, on IV ABx, afebrile, stable, monitored.  HTN: Controlled,  on antihypertensive medications.  GIB: s/p egd/colonoscopy + gastritis, FU GI        Jose Alatorre MD  Cell: [phone]  Office: [phone]

## 2019-11-04 NOTE — PROGRESS NOTE ADULT - ASSESSMENT
62 yo male with DM, HTN, and epilepsy was transferred from [facility] for possible T12-L1 discitis / osteomyelitis following a mechanical fall 5 days ago. Currently receiving IV Vanc/Zosyn for right foot osteomyelitis. Was seen at [facility] 10/20 where a CT abd/pelvis found an enlarged psoas muscle with ? abscesses with possible extension to the T12 L1 disc with a compression fracture suspicious for osteomyelitis.      Overall foot OM, concern for psoas abscess/spinal OM, elevated ESR. Uncontrolled DM.       Plan:  - s/p IR guided aspiration of purulent fluid, bacterial, fungal and AFB cx NTD, cytology consistent with abscess.   - f/u BCx, NTD.   - c/w Meropenem 1 gm iv q8h   - C/W Vanc 1g q12.   - trough therapeutic   - S/p Neurosurgery eval   - s/p podiatry eval.   - Positive Hep C serology, viral load almost undetectable.   - will need 6 weeks therapy until 12/15/19, CBC/CMP once a week while on OPAT.  - Pt to follow up for repeat MRI after completion of therapy.

## 2019-11-04 NOTE — PROVIDER CONTACT NOTE (OTHER) - SITUATION
pts left arm PICC line is out. pt verbalized "I got it caught on the side of the bed".
Pt receiving vancomycin IV abx, needs vanco trough to be drawn
pt has order for lantus 15 units and lantus 30 units. pt was NPO 10/23/19 at 23: 59 for EGD/ Colonoscopy today. diet resumed tolerating well.

## 2019-11-04 NOTE — PROGRESS NOTE ADULT - SUBJECTIVE AND OBJECTIVE BOX
63y old  Male who presents with a chief complaint of Back pain (04 Nov 2019 14:58)      Interval history:  Afebrile, keeps complaining of back pain, denies any other complains.       No Known Allergies      Antimicrobials:  meropenem  IVPB 1000 milliGRAM(s) IV Intermittent every 8 hours  vancomycin  IVPB 1000 milliGRAM(s) IV Intermittent every 12 hours      REVIEW OF SYSTEMS:  No chest pain   No cough, no SOB  No N/V  No dysuria   No rash.       Vital Signs Last 24 Hrs  T(C): 36.6 (11-04-19 @ 16:09), Max: 37.2 (11-03-19 @ 22:00)  T(F): 97.8 (11-04-19 @ 16:09), Max: 99 (11-03-19 @ 22:00)  HR: 75 (11-04-19 @ 16:09) (75 - 99)  BP: 135/75 (11-04-19 @ 16:09) (117/67 - 142/89)  BP(mean): --  RR: 18 (11-04-19 @ 16:09) (18 - 18)  SpO2: 97% (11-04-19 @ 16:09) (94% - 97%)      PHYSICAL EXAM:  Patient in no acute distress. Alert, awake. Laying in bed.   Cardiovascular: S1S2 normal.  Lungs: + air entry B/L lung fields.  Gastrointestinal: soft, nontender, nondistended.  Extremities: no edema.  + PICC   b/l foot ulcers with dressing                             8.6    6.13  )-----------( 265      ( 04 Nov 2019 08:03 )             27.4   11-04    135  |  99  |  18  ----------------------------<  140<H>  4.3   |  24  |  1.10    Ca    9.9      04 Nov 2019 06:49      Culture - Body Fluid with Gram Stain (10.30.19 @ 23:13)    Gram Stain:   Numerous polymorphonuclear leukocytes per low power field  No organisms seen    Specimen Source: .Body Fluid Right psoas collection    Culture Results:   No growth

## 2019-11-04 NOTE — PROGRESS NOTE ADULT - SUBJECTIVE AND OBJECTIVE BOX
Patient is a 63y old  Male who presents with a chief complaint of Back pain (22 Oct 2019 08:44)      SUBJECTIVE / OVERNIGHT EVENTS:  No new events, Afebrile  S/P  drainage of psoas abscess   PICC dislodged    Review of Systems:   CONSTITUTIONAL: No fever, weight loss, or fatigue  EYES: No eye pain, visual disturbances, or discharge  ENMT:  No difficulty hearing, tinnitus, vertigo; No sinus or throat pain  NECK: No pain or stiffness  BREASTS: No pain, masses, or nipple discharge  RESPIRATORY: No cough, wheezing, chills or hemoptysis; No shortness of breath  CARDIOVASCULAR: No chest pain, palpitations, dizziness, or leg swelling  GASTROINTESTINAL: No abdominal or epigastric pain. No nausea, vomiting, or hematemesis; No diarrhea or constipation. No melena or hematochezia.  GENITOURINARY: No dysuria, frequency, hematuria, or incontinence  NEUROLOGICAL: No headaches, memory loss, loss of strength, numbness, or tremors  SKIN: No itching, burning, rashes, or lesions   LYMPH NODES: No enlarged glands  ENDOCRINE: No heat or cold intolerance; No hair loss  MUSCULOSKELETAL: No joint pain or swelling;   PSYCHIATRIC: No depression, anxiety, mood swings, or difficulty sleeping  HEME/LYMPH: No easy bruising, or bleeding gums  ALLERY AND IMMUNOLOGIC: No hives or eczema    MEDICATIONS  (STANDING):  amLODIPine   Tablet 10 milliGRAM(s) Oral daily  apixaban 5 milliGRAM(s) Oral every 12 hours  chlorhexidine 4% Liquid 1 Application(s) Topical <User Schedule>  dextrose 5%. 1000 milliLiter(s) (50 mL/Hr) IV Continuous <Continuous>  dextrose 50% Injectable 12.5 Gram(s) IV Push once  dextrose 50% Injectable 25 Gram(s) IV Push once  dextrose 50% Injectable 25 Gram(s) IV Push once  diVALproex  milliGRAM(s) Oral daily  ferrous    sulfate 325 milliGRAM(s) Oral daily  folic acid 1 milliGRAM(s) Oral daily  gabapentin 100 milliGRAM(s) Oral three times a day  insulin glargine Injectable (LANTUS) 26 Unit(s) SubCutaneous at bedtime  insulin lispro (HumaLOG) corrective regimen sliding scale   SubCutaneous three times a day before meals  insulin lispro (HumaLOG) corrective regimen sliding scale   SubCutaneous at bedtime  insulin lispro Injectable (HumaLOG) 10 Unit(s) SubCutaneous three times a day before meals  levETIRAcetam 500 milliGRAM(s) Oral two times a day  mirtazapine 30 milliGRAM(s) Oral daily  piperacillin/tazobactam IVPB.. 3.375 Gram(s) IV Intermittent every 8 hours  tiotropium 18 MICROgram(s) Capsule 1 Capsule(s) Inhalation daily  vancomycin  IVPB 1000 milliGRAM(s) IV Intermittent every 12 hours    MEDICATIONS  (PRN):  acetaminophen   Tablet .. 325 milliGRAM(s) Oral daily PRN Temp greater or equal to 38C (100.4F), Mild Pain (1 - 3)  ALBUTerol    90 MICROgram(s) HFA Inhaler 2 Puff(s) Inhalation every 6 hours PRN Shortness of Breath and/or Wheezing  dextrose 40% Gel 15 Gram(s) Oral once PRN Blood Glucose LESS THAN 70 milliGRAM(s)/deciliter  glucagon  Injectable 1 milliGRAM(s) IntraMuscular once PRN Glucose LESS THAN 70 milligrams/deciliter  HYDROmorphone  Injectable 1 milliGRAM(s) IV Push every 4 hours PRN Severe Pain (7 - 10)  oxyCODONE    5 mG/acetaminophen 325 mG 2 Tablet(s) Oral every 4 hours PRN Moderate Pain (4 - 6)  sodium chloride 0.9% lock flush 10 milliLiter(s) IV Push every 1 hour PRN Pre/post blood products, medications, blood draw, and to maintain line patency      PHYSICAL EXAM:  Vital Signs Last 24 Hrs  T(C): 36.8 (22 Oct 2019 09:12), Max: 37.3 (21 Oct 2019 15:24)  T(F): 98.2 (22 Oct 2019 09:12), Max: 99.1 (21 Oct 2019 15:24)  HR: 89 (22 Oct 2019 09:12) (88 - 99)  BP: 162/87 (22 Oct 2019 09:12) (144/74 - 162/87)  BP(mean): --  RR: 18 (22 Oct 2019 09:12) (18 - 18)  SpO2: 98% (22 Oct 2019 09:12) (96% - 98%)  I&O's Summary    21 Oct 2019 07:01  -  22 Oct 2019 07:00  --------------------------------------------------------  IN: 720 mL / OUT: 650 mL / NET: 70 mL    22 Oct 2019 07:01  -  22 Oct 2019 12:52  --------------------------------------------------------  IN: 600 mL / OUT: 450 mL / NET: 150 mL      GENERAL: NAD, well-developed  HEAD:  Atraumatic, Normocephalic  EYES: EOMI, PERRLA, conjunctiva and sclera clear  NECK: Supple, No JVD  CHEST/LUNG: Clear to auscultation bilaterally; No wheeze  HEART: Regular rate and rhythm; No murmurs, rubs, or gallops  ABDOMEN: Soft, Nontender, Nondistended; Bowel sounds present  EXTREMITIES:  elda TMA  PSYCH: AAOx3  NEUROLOGY: non-focal  SKIN: No rashes or lesions    LABS:  CAPILLARY BLOOD GLUCOSE      POCT Blood Glucose.: 157 mg/dL (22 Oct 2019 11:59)  POCT Blood Glucose.: 280 mg/dL (22 Oct 2019 08:57)  POCT Blood Glucose.: 299 mg/dL (21 Oct 2019 22:05)  POCT Blood Glucose.: 260 mg/dL (21 Oct 2019 17:11)                          8.3    9.26  )-----------( 401      ( 21 Oct 2019 09:40 )             26.8     10-21    135  |  100  |  11  ----------------------------<  184<H>  3.9   |  25  |  1.16    Ca    9.2      21 Oct 2019 09:40    TPro  8.9<H>  /  Alb  3.2<L>  /  TBili  0.3  /  DBili  x   /  AST  13  /  ALT  7<L>  /  AlkPhos  66  10-21    PT/INR - ( 21 Oct 2019 09:40 )   PT: 12.6 sec;   INR: 1.09 ratio         PTT - ( 21 Oct 2019 09:40 )  PTT:30.3 sec          RADIOLOGY & ADDITIONAL TESTS:    Imaging Personally Reviewed:    Consultant(s) Notes Reviewed:      Care Discussed with Consultants/Other Providers:

## 2019-11-05 LAB
GLUCOSE BLDC GLUCOMTR-MCNC: 121 MG/DL — HIGH (ref 70–99)
GLUCOSE BLDC GLUCOMTR-MCNC: 122 MG/DL — HIGH (ref 70–99)
GLUCOSE BLDC GLUCOMTR-MCNC: 127 MG/DL — HIGH (ref 70–99)
GLUCOSE BLDC GLUCOMTR-MCNC: 133 MG/DL — HIGH (ref 70–99)

## 2019-11-05 PROCEDURE — 71045 X-RAY EXAM CHEST 1 VIEW: CPT | Mod: 26

## 2019-11-05 PROCEDURE — 99232 SBSQ HOSP IP/OBS MODERATE 35: CPT

## 2019-11-05 RX ORDER — HYDROMORPHONE HYDROCHLORIDE 2 MG/ML
1 INJECTION INTRAMUSCULAR; INTRAVENOUS; SUBCUTANEOUS ONCE
Refills: 0 | Status: DISCONTINUED | OUTPATIENT
Start: 2019-11-05 | End: 2019-11-05

## 2019-11-05 RX ORDER — HYDROMORPHONE HYDROCHLORIDE 2 MG/ML
1 INJECTION INTRAMUSCULAR; INTRAVENOUS; SUBCUTANEOUS EVERY 4 HOURS
Refills: 0 | Status: DISCONTINUED | OUTPATIENT
Start: 2019-11-05 | End: 2019-11-08

## 2019-11-05 RX ADMIN — Medication 250 MILLIGRAM(S): at 00:34

## 2019-11-05 RX ADMIN — MEROPENEM 100 MILLIGRAM(S): 1 INJECTION INTRAVENOUS at 05:06

## 2019-11-05 RX ADMIN — GABAPENTIN 300 MILLIGRAM(S): 400 CAPSULE ORAL at 13:05

## 2019-11-05 RX ADMIN — HYDROMORPHONE HYDROCHLORIDE 1 MILLIGRAM(S): 2 INJECTION INTRAMUSCULAR; INTRAVENOUS; SUBCUTANEOUS at 02:12

## 2019-11-05 RX ADMIN — Medication 12 UNIT(S): at 08:33

## 2019-11-05 RX ADMIN — LEVETIRACETAM 500 MILLIGRAM(S): 250 TABLET, FILM COATED ORAL at 05:06

## 2019-11-05 RX ADMIN — PANTOPRAZOLE SODIUM 40 MILLIGRAM(S): 20 TABLET, DELAYED RELEASE ORAL at 05:07

## 2019-11-05 RX ADMIN — OXYCODONE AND ACETAMINOPHEN 2 TABLET(S): 5; 325 TABLET ORAL at 20:49

## 2019-11-05 RX ADMIN — Medication 325 MILLIGRAM(S): at 13:05

## 2019-11-05 RX ADMIN — AMLODIPINE BESYLATE 10 MILLIGRAM(S): 2.5 TABLET ORAL at 05:06

## 2019-11-05 RX ADMIN — OXYCODONE AND ACETAMINOPHEN 2 TABLET(S): 5; 325 TABLET ORAL at 05:07

## 2019-11-05 RX ADMIN — MEROPENEM 100 MILLIGRAM(S): 1 INJECTION INTRAVENOUS at 14:47

## 2019-11-05 RX ADMIN — POLYETHYLENE GLYCOL 3350 17 GRAM(S): 17 POWDER, FOR SOLUTION ORAL at 05:09

## 2019-11-05 RX ADMIN — MEROPENEM 100 MILLIGRAM(S): 1 INJECTION INTRAVENOUS at 21:47

## 2019-11-05 RX ADMIN — HYDROMORPHONE HYDROCHLORIDE 1 MILLIGRAM(S): 2 INJECTION INTRAMUSCULAR; INTRAVENOUS; SUBCUTANEOUS at 23:59

## 2019-11-05 RX ADMIN — GABAPENTIN 300 MILLIGRAM(S): 400 CAPSULE ORAL at 05:06

## 2019-11-05 RX ADMIN — HYDROMORPHONE HYDROCHLORIDE 1 MILLIGRAM(S): 2 INJECTION INTRAMUSCULAR; INTRAVENOUS; SUBCUTANEOUS at 03:22

## 2019-11-05 RX ADMIN — OXYCODONE AND ACETAMINOPHEN 2 TABLET(S): 5; 325 TABLET ORAL at 23:02

## 2019-11-05 RX ADMIN — Medication 12 UNIT(S): at 12:22

## 2019-11-05 RX ADMIN — POLYETHYLENE GLYCOL 3350 17 GRAM(S): 17 POWDER, FOR SOLUTION ORAL at 17:12

## 2019-11-05 RX ADMIN — TIOTROPIUM BROMIDE 1 CAPSULE(S): 18 CAPSULE ORAL; RESPIRATORY (INHALATION) at 13:05

## 2019-11-05 RX ADMIN — OXYCODONE AND ACETAMINOPHEN 2 TABLET(S): 5; 325 TABLET ORAL at 00:32

## 2019-11-05 RX ADMIN — HYDROMORPHONE HYDROCHLORIDE 1 MILLIGRAM(S): 2 INJECTION INTRAMUSCULAR; INTRAVENOUS; SUBCUTANEOUS at 08:33

## 2019-11-05 RX ADMIN — SENNA PLUS 2 TABLET(S): 8.6 TABLET ORAL at 20:50

## 2019-11-05 RX ADMIN — Medication 250 MILLIGRAM(S): at 13:05

## 2019-11-05 RX ADMIN — DIVALPROEX SODIUM 500 MILLIGRAM(S): 500 TABLET, DELAYED RELEASE ORAL at 13:05

## 2019-11-05 RX ADMIN — Medication 1 APPLICATION(S): at 12:27

## 2019-11-05 RX ADMIN — Medication 1 MILLIGRAM(S): at 13:05

## 2019-11-05 RX ADMIN — HEPARIN SODIUM 5000 UNIT(S): 5000 INJECTION INTRAVENOUS; SUBCUTANEOUS at 05:07

## 2019-11-05 RX ADMIN — INSULIN GLARGINE 30 UNIT(S): 100 INJECTION, SOLUTION SUBCUTANEOUS at 21:47

## 2019-11-05 RX ADMIN — HYDROMORPHONE HYDROCHLORIDE 1 MILLIGRAM(S): 2 INJECTION INTRAMUSCULAR; INTRAVENOUS; SUBCUTANEOUS at 08:06

## 2019-11-05 RX ADMIN — MIRTAZAPINE 30 MILLIGRAM(S): 45 TABLET, ORALLY DISINTEGRATING ORAL at 20:51

## 2019-11-05 RX ADMIN — PANTOPRAZOLE SODIUM 40 MILLIGRAM(S): 20 TABLET, DELAYED RELEASE ORAL at 17:10

## 2019-11-05 RX ADMIN — GABAPENTIN 300 MILLIGRAM(S): 400 CAPSULE ORAL at 20:51

## 2019-11-05 RX ADMIN — OXYCODONE AND ACETAMINOPHEN 2 TABLET(S): 5; 325 TABLET ORAL at 05:45

## 2019-11-05 RX ADMIN — HYDROMORPHONE HYDROCHLORIDE 1 MILLIGRAM(S): 2 INJECTION INTRAMUSCULAR; INTRAVENOUS; SUBCUTANEOUS at 12:21

## 2019-11-05 RX ADMIN — CHLORHEXIDINE GLUCONATE 1 APPLICATION(S): 213 SOLUTION TOPICAL at 06:48

## 2019-11-05 RX ADMIN — HYDROMORPHONE HYDROCHLORIDE 1 MILLIGRAM(S): 2 INJECTION INTRAMUSCULAR; INTRAVENOUS; SUBCUTANEOUS at 17:10

## 2019-11-05 RX ADMIN — LEVETIRACETAM 500 MILLIGRAM(S): 250 TABLET, FILM COATED ORAL at 17:10

## 2019-11-05 RX ADMIN — HYDROMORPHONE HYDROCHLORIDE 1 MILLIGRAM(S): 2 INJECTION INTRAMUSCULAR; INTRAVENOUS; SUBCUTANEOUS at 17:33

## 2019-11-05 RX ADMIN — Medication 12 UNIT(S): at 17:10

## 2019-11-05 RX ADMIN — HEPARIN SODIUM 5000 UNIT(S): 5000 INJECTION INTRAVENOUS; SUBCUTANEOUS at 17:10

## 2019-11-05 RX ADMIN — HYDROMORPHONE HYDROCHLORIDE 1 MILLIGRAM(S): 2 INJECTION INTRAMUSCULAR; INTRAVENOUS; SUBCUTANEOUS at 12:45

## 2019-11-05 RX ADMIN — OXYCODONE AND ACETAMINOPHEN 2 TABLET(S): 5; 325 TABLET ORAL at 02:06

## 2019-11-05 NOTE — PROGRESS NOTE ADULT - ASSESSMENT
62 yo male with DM, HTN, and epilepsy was transferred from [facility] for possible T12-L1 discitis / osteomyelitis following a mechanical fall 5 days ago. Currently receiving IV Vanc/Zosyn for right foot osteomyelitis. Was seen at [facility] 10/20 where a CT abd/pelvis found an enlarged psoas muscle with ? abscesses with possible extension to the T12 L1 disc with a compression fracture suspicious for osteomyelitis.      Overall foot OM, concern for psoas abscess/spinal OM, elevated ESR. Uncontrolled DM.       Plan:  - s/p IR guided aspiration of purulent fluid, bacterial, fungal and AFB cx NTD, cytology consistent with abscess.   - f/u BCx, NTD.   - c/w Meropenem 1 gm iv q8h   - C/W Vanc 1g q12.   - trough therapeutic   - S/p Neurosurgery eval   - s/p podiatry eval.   - Positive Hep C serology, viral load almost undetectable.   - will need 6 weeks therapy until 12/15/19, CBC/CMP once a week while on OPAT.  - CBC/CMP/Vanco trough once a week while on abx, labs to be followed by rehab physician.   - Pt to follow up for repeat MRI after completion of therapy.

## 2019-11-05 NOTE — PROVIDER CONTACT NOTE (MEDICATION) - SITUATION
pts breakfast time . per SS no insulin coverage needed.  pt is ordered for 12 units insulin pre-meal. pt refusing at this time. pt educated on importance of pre-meal insulin. pt continues to refuse
Pt has c/o back pain requesting iv dilauded

## 2019-11-05 NOTE — DISCHARGE NOTE PROVIDER - HOSPITAL COURSE
64 yo male with DM, HTN, and epilepsy was transferred from [facility] for possible T12-L1 discitis / osteomyelitis following a mechanical fall 5 days prior to admission. Currently receiving IV Vanc/Zosyn for right foot osteomyelitis. Was seen at [facility] 10/20 where a CT abd/pelvis found an enlarged psoas muscle with ? abscesses with possible extension to the T12 L1 disc with a compression fracture suspicious for osteomyelitis.      Overall foot OM, psoas abscess/spinal OM, elevated ESR. Uncontrolled DM.     - s/p IR guided aspiration of purulent fluid, bacterial, fungal and AFB cx NTD, cytology consistent with abscess.     - f/u BCx, NTD.     - c/w Meropenem 1 gm iv q8h.    - C/W Vanc 1g q12.     - trough therapeutic     - S/p Neurosurgery eval     - s/p podiatry eval.     - Positive Hep C serology, viral load almost undetectable.     - will need 6 weeks therapy until 12/15/19, CBC/CMP once a week while on OPAT.    - CBC/CMP/Vanco trough once a week while on abx, labs to be followed by rehab physician.     - Pt to follow up for repeat MRI after completion of therapy.     Controlled type 2 diabetes mellitus without complication, with long-term current use of insulin.      RISS, Insulin doses as ordered.     Other chronic osteomyelitis of foot. : Podiatry FU noted.     Deep vein thrombosis (DVT) of upper extremity, unspecified chronicity, unspecified laterality, unspecified vein.  Plan: Dopplers of all extremities do not show DVT in legs. Eliquis stopped.     Anemia due to blood loss.  Plan: stable CBC    transfuse if Hb <8.    Depression: Psych recommendations noted    No evidence of Hep C RNA level undetectable as per labs drawn on 10/22/19. No need for treatment.     Pt stable for discharge to Sub Acute Rehab today per Medical Attending Dr Caraballo.

## 2019-11-05 NOTE — DISCHARGE NOTE PROVIDER - CARE PROVIDER_API CALL
Marquita Brian)  Infectious Disease; Internal Medicine  [address] [place] [address]  Phone: [phone]  Fax: [phone]  Follow Up Time:

## 2019-11-05 NOTE — PROGRESS NOTE ADULT - SUBJECTIVE AND OBJECTIVE BOX
Patient is a 63y old  Male who presents with a chief complaint of Back pain (04 Nov 2019 21:26)       INTERVAL HPI/OVERNIGHT EVENTS:  Patient seen and evaluated at bedside.  Pt is resting comfortable in NAD. Denies N/V/F/C.     Allergies    No Known Allergies    Intolerances        Vital Signs Last 24 Hrs  T(C): 37 (05 Nov 2019 04:52), Max: 37.2 (04 Nov 2019 23:53)  T(F): 98.6 (05 Nov 2019 04:52), Max: 98.9 (04 Nov 2019 23:53)  HR: 84 (05 Nov 2019 04:52) (75 - 902)  BP: 120/70 (05 Nov 2019 04:52) (109/67 - 135/75)  BP(mean): --  RR: 18 (05 Nov 2019 04:52) (18 - 18)  SpO2: 96% (05 Nov 2019 04:52) (94% - 97%)    LABS:                        8.6    6.13  )-----------( 265      ( 04 Nov 2019 08:03 )             27.4     11-04    135  |  99  |  18  ----------------------------<  140<H>  4.3   |  24  |  1.10    Ca    9.9      04 Nov 2019 06:49          CAPILLARY BLOOD GLUCOSE      POCT Blood Glucose.: 133 mg/dL (05 Nov 2019 08:18)  POCT Blood Glucose.: 184 mg/dL (04 Nov 2019 21:45)  POCT Blood Glucose.: 96 mg/dL (04 Nov 2019 16:56)  POCT Blood Glucose.: 204 mg/dL (04 Nov 2019 12:11)      Lower Extremity Physical Exam:  Vasular: DP/PT 2/4, B/L, non palpable DP Right foot, CFT < 3 seconds L, Temperature gradient wnl B/L.   Neuro: Epicritic sensation absent to the level of ankle B/L.  Musculoskeletal/Ortho: s/p Right foot choparts amputation and Left foot 1st toe amp  Skin: Right foot plantar Right foot stump ulceration, mostly granular tissue with track to bone, no purulence, no malodor, no cellulitis, no signs of infection   Left foot dorsal and plantar 3rd metatarsal head ulcer with fibrogranular base, probes to bone, no purulence/malodor/signs of infection    RADIOLOGY & ADDITIONAL TESTS:

## 2019-11-05 NOTE — PROGRESS NOTE ADULT - SUBJECTIVE AND OBJECTIVE BOX
Chief complaint  Patient is a 63y old  Male who presents with a chief complaint of Back pain (05 Nov 2019 09:27)   Review of systems  Patient in bed, looks comfortable, no fever, no hypoglycemia.    Labs and Fingersticks  CAPILLARY BLOOD GLUCOSE      POCT Blood Glucose.: 122 mg/dL (05 Nov 2019 12:03)  POCT Blood Glucose.: 133 mg/dL (05 Nov 2019 08:18)  POCT Blood Glucose.: 184 mg/dL (04 Nov 2019 21:45)  POCT Blood Glucose.: 96 mg/dL (04 Nov 2019 16:56)      Anion Gap, Serum: 12 (11-04 @ 06:49)      Calcium, Total Serum: 9.9 (11-04 @ 06:49)          11-04    135  |  99  |  18  ----------------------------<  140<H>  4.3   |  24  |  1.10    Ca    9.9      04 Nov 2019 06:49                          8.6    6.13  )-----------( 265      ( 04 Nov 2019 08:03 )             27.4     Medications  MEDICATIONS  (STANDING):  amLODIPine   Tablet 10 milliGRAM(s) Oral daily  chlorhexidine 2% Cloths 1 Application(s) Topical daily  chlorhexidine 4% Liquid 1 Application(s) Topical <User Schedule>  collagenase Ointment 1 Application(s) Topical daily  dextrose 5%. 1000 milliLiter(s) (50 mL/Hr) IV Continuous <Continuous>  dextrose 50% Injectable 12.5 Gram(s) IV Push once  dextrose 50% Injectable 25 Gram(s) IV Push once  dextrose 50% Injectable 25 Gram(s) IV Push once  diVALproex  milliGRAM(s) Oral daily  ferrous    sulfate 325 milliGRAM(s) Oral daily  folic acid 1 milliGRAM(s) Oral daily  gabapentin 300 milliGRAM(s) Oral three times a day  heparin  Injectable 5000 Unit(s) SubCutaneous two times a day  insulin glargine Injectable (LANTUS) 30 Unit(s) SubCutaneous at bedtime  insulin lispro (HumaLOG) corrective regimen sliding scale   SubCutaneous three times a day before meals  insulin lispro (HumaLOG) corrective regimen sliding scale   SubCutaneous at bedtime  insulin lispro Injectable (HumaLOG) 12 Unit(s) SubCutaneous three times a day before meals  levETIRAcetam 500 milliGRAM(s) Oral two times a day  meropenem  IVPB 1000 milliGRAM(s) IV Intermittent every 8 hours  mirtazapine 30 milliGRAM(s) Oral daily  pantoprazole  Injectable 40 milliGRAM(s) IV Push two times a day  polyethylene glycol 3350 17 Gram(s) Oral two times a day  senna 2 Tablet(s) Oral at bedtime  tiotropium 18 MICROgram(s) Capsule 1 Capsule(s) Inhalation daily  vancomycin  IVPB 1000 milliGRAM(s) IV Intermittent every 12 hours      Physical Exam  General: Patient comfortable in bed  Vital Signs Last 12 Hrs  T(F): 98.6 (11-05-19 @ 04:52), Max: 98.6 (11-05-19 @ 04:52)  HR: 84 (11-05-19 @ 04:52) (84 - 84)  BP: 120/70 (11-05-19 @ 04:52) (120/70 - 120/70)  BP(mean): --  RR: 18 (11-05-19 @ 04:52) (18 - 18)  SpO2: 96% (11-05-19 @ 04:52) (96% - 96%)  Neck: No palpable thyroid nodules.  CVS: S1S2, No murmurs  Respiratory: No wheezing, no crepitations  GI: Abdomen soft, bowel sounds positive  Musculoskeletal:  edema lower extremities.   Skin: No skin rashes, no ecchymosis    Diagnostics Chief complaint  Patient is a 63y old  Male who presents with a chief complaint of Back pain (05 Nov 2019 09:27)   Review of systems  Patient in bed, looks comfortable, no fever,  no hypoglycemia.    Labs and Fingersticks  CAPILLARY BLOOD GLUCOSE      POCT Blood Glucose.: 122 mg/dL (05 Nov 2019 12:03)  POCT Blood Glucose.: 133 mg/dL (05 Nov 2019 08:18)  POCT Blood Glucose.: 184 mg/dL (04 Nov 2019 21:45)  POCT Blood Glucose.: 96 mg/dL (04 Nov 2019 16:56)      Anion Gap, Serum: 12 (11-04 @ 06:49)      Calcium, Total Serum: 9.9 (11-04 @ 06:49)          11-04    135  |  99  |  18  ----------------------------<  140<H>  4.3   |  24  |  1.10    Ca    9.9      04 Nov 2019 06:49                          8.6    6.13  )-----------( 265      ( 04 Nov 2019 08:03 )             27.4     Medications  MEDICATIONS  (STANDING):  amLODIPine   Tablet 10 milliGRAM(s) Oral daily  chlorhexidine 2% Cloths 1 Application(s) Topical daily  chlorhexidine 4% Liquid 1 Application(s) Topical <User Schedule>  collagenase Ointment 1 Application(s) Topical daily  dextrose 5%. 1000 milliLiter(s) (50 mL/Hr) IV Continuous <Continuous>  dextrose 50% Injectable 12.5 Gram(s) IV Push once  dextrose 50% Injectable 25 Gram(s) IV Push once  dextrose 50% Injectable 25 Gram(s) IV Push once  diVALproex  milliGRAM(s) Oral daily  ferrous    sulfate 325 milliGRAM(s) Oral daily  folic acid 1 milliGRAM(s) Oral daily  gabapentin 300 milliGRAM(s) Oral three times a day  heparin  Injectable 5000 Unit(s) SubCutaneous two times a day  insulin glargine Injectable (LANTUS) 30 Unit(s) SubCutaneous at bedtime  insulin lispro (HumaLOG) corrective regimen sliding scale   SubCutaneous three times a day before meals  insulin lispro (HumaLOG) corrective regimen sliding scale   SubCutaneous at bedtime  insulin lispro Injectable (HumaLOG) 12 Unit(s) SubCutaneous three times a day before meals  levETIRAcetam 500 milliGRAM(s) Oral two times a day  meropenem  IVPB 1000 milliGRAM(s) IV Intermittent every 8 hours  mirtazapine 30 milliGRAM(s) Oral daily  pantoprazole  Injectable 40 milliGRAM(s) IV Push two times a day  polyethylene glycol 3350 17 Gram(s) Oral two times a day  senna 2 Tablet(s) Oral at bedtime  tiotropium 18 MICROgram(s) Capsule 1 Capsule(s) Inhalation daily  vancomycin  IVPB 1000 milliGRAM(s) IV Intermittent every 12 hours      Physical Exam  General: Patient comfortable in bed  Vital Signs Last 12 Hrs  T(F): 98.6 (11-05-19 @ 04:52), Max: 98.6 (11-05-19 @ 04:52)  HR: 84 (11-05-19 @ 04:52) (84 - 84)  BP: 120/70 (11-05-19 @ 04:52) (120/70 - 120/70)  BP(mean): --  RR: 18 (11-05-19 @ 04:52) (18 - 18)  SpO2: 96% (11-05-19 @ 04:52) (96% - 96%)  Neck: No palpable thyroid nodules.  CVS: S1S2, No murmurs  Respiratory: No wheezing, no crepitations  GI: Abdomen soft, bowel sounds positive  Musculoskeletal:  edema lower extremities.   Skin: No skin rashes, no ecchymosis    Diagnostics

## 2019-11-05 NOTE — PROGRESS NOTE ADULT - SUBJECTIVE AND OBJECTIVE BOX
63y old  Male who presents with a chief complaint of Back pain (05 Nov 2019 13:12)      Interval history:  Afebrile, complains of back pain, does not want percocet, waiting for his next dose. Pt had dislodged picc, s/p replacement today.       No Known Allergies      Antimicrobials:  meropenem  IVPB 1000 milliGRAM(s) IV Intermittent every 8 hours  vancomycin  IVPB 1000 milliGRAM(s) IV Intermittent every 12 hours      REVIEW OF SYSTEMS:  No chest pain  No cough, no SOB  No N/V/D, no abdominal pain  No dysuria   No rash.       Vital Signs Last 24 Hrs  T(C): 37 (11-05-19 @ 13:46), Max: 37.2 (11-04-19 @ 23:53)  T(F): 98.6 (11-05-19 @ 13:46), Max: 98.9 (11-04-19 @ 23:53)  HR: 83 (11-05-19 @ 13:46) (75 - 902)  BP: 123/74 (11-05-19 @ 13:46) (109/67 - 135/75)  BP(mean): --  RR: 18 (11-05-19 @ 13:46) (18 - 18)  SpO2: 98% (11-05-19 @ 13:46) (94% - 98%)      PHYSICAL EXAM:  Patient in no acute distress. Alert, awake.   Cardiovascular: S1S2 normal.  Lungs: + air entry B/L lung fields.  Gastrointestinal: soft, nontender, nondistended.  Extremities: no edema.  + Rt arm PICC   b/l foot ulcers with dressing                             8.6    6.13  )-----------( 265      ( 04 Nov 2019 08:03 )             27.4   11-04    135  |  99  |  18  ----------------------------<  140<H>  4.3   |  24  |  1.10    Ca    9.9      04 Nov 2019 06:49      Radiology:    < from: Xray Chest 1 View- PORTABLE-Urgent (11.05.19 @ 12:09) >  IMPRESSION:     Right PICC terminates at the cavoatrial junction. No pneumothorax. Clear   lungs.

## 2019-11-05 NOTE — PROGRESS NOTE ADULT - ASSESSMENT
Assessment  DMT2: 63y Male with DM T2 with hyperglycemia was on oral meds at home, now on insulin, sugars improved and trending within acceptable range, no hypoglycemic episode,  s/p PICC replacement this AM, comfortable, eating full meals.  LE Abscess: S/P IR drainage of abscess, on IV ABx, afebrile, stable, monitored.  HTN: Controlled,  on antihypertensive medications.  GIB: s/p egd/colonoscopy + gastritis, FU GI        Jose Alatorre MD  Cell: [phone]  Office: [phone] Assessment  DMT2: 63y Male with DM T2 with hyperglycemia was on oral meds at home, now on insulin, sugars improved and trending within acceptable range, no hypoglycemic episode,  s/p PICC replacement this AM,  comfortable, eating full meals.  LE Abscess: S/P IR drainage of abscess, on IV ABx, afebrile, stable, monitored.  HTN: Controlled,  on antihypertensive medications.  GIB: s/p egd/colonoscopy + gastritis, FU GI        Jose Alatorre MD  Cell: [phone]  Office: [phone]

## 2019-11-05 NOTE — DISCHARGE NOTE PROVIDER - NSDCFUADDAPPT_GEN_ALL_CORE_FT
VERY IMPORTANT INFORMATION FOR ANTIBIOTIC THERAPY FOR OSTEOMYELITIS:  Complete 6 more weeks of intravenous antibiotics via PICC Line.  UNTIL DECEMBER 15, 2019 FOR PSOAS ABSCESS AND OSTEOMYELITIS.   TO FOLLOW UP FOR REPEAT MRI AFTER COM,PLETION OF ANTIBIOTIC THERAPY WHILE IN REHAB. VERY IMPORTANT INFORMATION FOR ANTIBIOTIC THERAPY FOR OSTEOMYELITIS:  Complete 6 more weeks of intravenous antibiotics via PICC Line.  UNTIL DECEMBER 15, 2019 FOR PSOAS ABSCESS AND OSTEOMYELITIS.   TO FOLLOW UP FOR REPEAT MRI AFTER COMPLETION OF ANTIBIOTIC THERAPY WHILE IN REHAB.    WOUND CARE INSTRUCTIONS: VERY IMPORTANT INFORMATION FOR ANTIBIOTIC THERAPY FOR OSTEOMYELITIS:  Complete 6 more weeks of intravenous antibiotics via PICC Line.  UNTIL DECEMBER 15, 2019 FOR PSOAS ABSCESS AND OSTEOMYELITIS.   TO FOLLOW UP FOR REPEAT MRI AFTER COMPLETION OF ANTIBIOTIC THERAPY WHILE IN REHAB.

## 2019-11-05 NOTE — DISCHARGE NOTE PROVIDER - NSDCFUADDINST_GEN_ALL_CORE_FT
Follow up with infectious disease  CBC/CMP/Vanco trough once a week while on antibiotic, labs to be followed by rehab physician.   You will need a repeat MRI in 6 weeks  Follow up with your Primary care doctor VERY IMPORTANT INFORMATION FOR ANTIBIOTIC THERAPY FOR OSTEOMYELITIS:  Complete 6 more weeks of intravenous antibiotics via PICC Line.  UNTIL DECEMBER 15, 2019 FOR PSOAS ABSCESS AND OSTEOMYELITIS.   YOU NEED TO FOLLOW UP A REPEAT MRI AFTER COMPLETION OF ANTIBIOTIC THERAPY WHILE IN REHAB.  LABS:  CBC/CMP/Vanco trough once a week while on antibiotic, labs to be followed by rehab physician.  Follow up with infectious disease md after discharge from Rehab.       WOUND CARE INSTRUCTIONS: VERY IMPORTANT INFORMATION FOR ANTIBIOTIC THERAPY FOR OSTEOMYELITIS:  Complete 6 more weeks of intravenous antibiotics via PICC Line.  UNTIL DECEMBER 15, 2019 FOR PSOAS ABSCESS AND OSTEOMYELITIS.   YOU NEED TO FOLLOW UP A REPEAT MRI AFTER COMPLETION OF ANTIBIOTIC THERAPY WHILE IN REHAB.  LABS:  CBC/CMP/Vanco trough once a week while on antibiotic, labs to be followed by rehab physician.  Follow up with infectious disease md after discharge from Rehab.       WOUND CARE INSTRUCTIONS:  Continue with santyl + DSD to b/l feet daily  Recommended for you to wear zflow boots  for offloading.

## 2019-11-05 NOTE — PROGRESS NOTE ADULT - ASSESSMENT
63M with bilateral foot ulcerations, chronic   -Pt seen and evaluated   -Chronic ulcerations of bilateral feet, increase granulation, stable with no signs of infection   -Pt uninterested in any further amputation at this time, would like to continue local wound care and abx regimen  -No plan for podiatric surgical intervention   -Continue with santyl + DSD to b/l feet daily  -Recommended to pt to wear zflows for offloading, admits he is non compliant    -Seen w/ attending

## 2019-11-05 NOTE — DISCHARGE NOTE PROVIDER - NSDCCPCAREPLAN_GEN_ALL_CORE_FT
PRINCIPAL DISCHARGE DIAGNOSIS  Diagnosis: Psoas abscess  Assessment and Plan of Treatment: Had aspiration by Interventional Radiology on 10/30      SECONDARY DISCHARGE DIAGNOSES  Diagnosis: Osteomyelitis  Assessment and Plan of Treatment: Complete 6 more weeks of intravenous antibiotics via PICC Line    Diagnosis: Foot ulceration  Assessment and Plan of Treatment: Seen by Podiatry. Continue with santyl + Dressing to bilateral feet daily  Recommended for you to wear zflows for offloading.    Diagnosis: Controlled type 2 diabetes mellitus without complication, with long-term current use of insulin  Assessment and Plan of Treatment: 10/22: HgbA1c- 10.2. Continue with your diabetic medications    Diagnosis: Discitis  Assessment and Plan of Treatment: PRINCIPAL DISCHARGE DIAGNOSIS  Diagnosis: Psoas abscess  Assessment and Plan of Treatment: Had aspiration by Interventional Radiology on 10/30.  To continue on Meropenem and Vancomycin for total of 6 weeks - UNTIL DECEMBER 15, 2019 FOR PSOAS ABSCESS AND OSTEOMYELITIS.         SECONDARY DISCHARGE DIAGNOSES  Diagnosis: Osteomyelitis  Assessment and Plan of Treatment: Complete 6 more weeks of intravenous antibiotics via PICC Line.  UNTIL DECEMBER 15, 2019 FOR PSOAS ABSCESS AND OSTEOMYELITIS.   TO FOLLOW UP FOR REPEAT MRI AFER COM,PLETION OF ANTIBIOTIC THERAPY WHILE IN REHAB.       Diagnosis: Foot ulceration  Assessment and Plan of Treatment: Seen by Podiatry. Continue with santyl + Dressing to bilateral feet daily  Recommended for you to wear zflows for offloading.    Diagnosis: Controlled type 2 diabetes mellitus without complication, with long-term current use of insulin  Assessment and Plan of Treatment: 10/22: HgbA1c- 10.2. Continue with your diabetic medications    Diagnosis: Discitis  Assessment and Plan of Treatment: PRINCIPAL DISCHARGE DIAGNOSIS  Diagnosis: Psoas abscess  Assessment and Plan of Treatment: Had aspiration by Interventional Radiology on 10/30.  To continue on Meropenem and Vancomycin for total of 6 weeks - UNTIL DECEMBER 15, 2019 FOR PSOAS ABSCESS AND OSTEOMYELITIS.         SECONDARY DISCHARGE DIAGNOSES  Diagnosis: Seizure  Assessment and Plan of Treatment: Take your medication as prescribed.   Follow up with your medical doctor for routine blood  work monitoring, and to establish long term treatment goals.      Diagnosis: Osteomyelitis  Assessment and Plan of Treatment: Complete 6 more weeks of intravenous antibiotics via PICC Line.  UNTIL DECEMBER 15, 2019 FOR PSOAS ABSCESS AND OSTEOMYELITIS.   TO FOLLOW UP FOR REPEAT MRI AFER COM,PLETION OF ANTIBIOTIC THERAPY WHILE IN REHAB.       Diagnosis: Foot ulceration  Assessment and Plan of Treatment: Chronic ulcerations of bilateral feet, increase granulation, stable with no signs of infection   Seen by Podiatry.  No plan for podiatric surgical intervention.  Recommendations:   Continue with santyl + DSD to b/l feet daily  Recommended for you to wear zflow boots  for offloading.  Stable for discharge from podiatric standpoint    Diagnosis: Controlled type 2 diabetes mellitus without complication, with long-term current use of insulin  Assessment and Plan of Treatment: Your Hemoglobin A1C (HgbA1c) was  10.2 on this admission on Oct 22nd.  Make sure you get your HgA1c checked every three months.  If you take oral diabetes medications, check your blood glucose two times a day.  If you take insulin, check your blood glucose before meals and at bedtime.  It's important not to skip any meals.  Keep a log of your blood glucose results and always take it with you to your doctor appointments.  Keep a list of your current medications including injectables and over the counter medications and bring this medication list with you to all your doctor appointments.  If you have not seen your ophthalmologist this year call for appointment.  Check your feet daily for redness, sores, or openings. Do not self treat. If no improvement in two days call your primary care physician for an appointment.  Low blood sugar (hypoglycemia) is a blood sugar below 70mg/dl. Check your blood sugar if you feel signs/symptoms of hypoglycemia. If your blood sugar is below 70 take 15 grams of carbohydrates (ex 4 oz of apple juice, 3-4 glucose tablets, or 4-6 oz of regular soda) wait 15 minutes and repeat blood sugar to make sure it comes up above 70.  If your blood sugar is above 70 and you are due for a meal, have a meal.  If you are not due for a meal have a snack.  This snack helps keeps your blood sugar at a safe range.      Diagnosis: Discitis  Assessment and Plan of Treatment: Continue Antibiotics as above.    Diagnosis: Mood disorder  Assessment and Plan of Treatment: Continue your medications as prescribed.  Follow up with a Psychiatrist within 1 week after disaharge from Rehab.

## 2019-11-05 NOTE — DISCHARGE NOTE PROVIDER - NSDCMRMEDTOKEN_GEN_ALL_CORE_FT
albuterol 0.63 mg/3 mL (0.021%) inhalation solution: 3 milliliter(s) inhaled 3 times a day  amLODIPine 10 mg oral tablet: 1 tab(s) orally once a day  apixaban 2.5 mg oral tablet: 1 tab(s) orally 2 times a day  divalproex sodium 500 mg oral tablet, extended release: 1 tab(s) orally twice daily  ferrous sulfate 324 mg (65 mg elemental iron) oral delayed release tablet: 1 tab(s) orally 2 times a day  folic acid 0.4 mg oral tablet: 1 tab(s) orally once a day  gabapentin 100 mg oral capsule: 1 cap(s) orally 3 times a day  hydroCHLOROthiazide 25 mg oral tablet: 1 tab(s) orally once a day  insulin glargine 100 units/mL subcutaneous solution: 30 unit(s) subcutaneous once (at bedtime)  insulin lispro (concentrated) 200 units/mL subcutaneous solution: 12 unit(s) subcutaneous 3 times a day  ipratropium-albuterol 0.5 mg-2.5 mg/3 mLinhalation solution: 3 milliliter(s) inhaled 4 times a day, As Needed  Keppra 500 mg oral tablet: 1 tab(s) orally once a day  mirtazapine 30 mg oral tablet: 1 tab(s) orally once a day  Percocet 5/325 oral tablet: 2 tab(s) orally STAT as needed for pain  Tylenol 325 mg oral tablet: 1 tab(s) orally once a day    Note: Pt also receives 650 mg PO Q6H PRN for back pain  Zosyn 3 g-0.375 g/50 mL intravenous solution: 3.375g IV Q8H for bacteremia x30 days     Note: Patient started treatment on 10/18/19. albuterol 90 mcg/inh inhalation aerosol: 2 puff(s) inhaled every 6 hours, As needed, Shortness of Breath and/or Wheezing  amLODIPine 10 mg oral tablet: 1 tab(s) orally once a day  bisacodyl 10 mg rectal suppository: 1 suppository(ies) rectal once a day, As needed, Constipation  divalproex sodium 500 mg oral tablet, extended release: 1 tab(s) orally   ferrous sulfate 324 mg (65 mg elemental iron) oral delayed release tablet: 1 tab(s) orally 2 times a day  folic acid 1 mg oral tablet: 1 tab(s) orally once a day  gabapentin 300 mg oral capsule: 1 cap(s) orally 3 times a day  heparin: 5000 unit(s) subcutaneous every 12 hours  insulin glargine: 30 unit(s) subcutaneous once a day (at bedtime)  Insulin Lispro (Humalog) Corrective Sliding Scale: 1 Unit(s) if Glucose 151 - 200  2 Unit(s) if Glucose 201 - 250  3 Unit(s) if Glucose 251 - 300  4 Unit(s) if Glucose 301 - 350  5 Unit(s) if Glucose 351 - 400  6 Unit(s) if Glucose Greater Than 400  Insulin Lispro (Humalog) Corrective Sliding Scale: 0 Unit(s) if Glucose 0 - 250  1 Unit(s) if Glucose 251 - 300  2 Unit(s) if Glucose 301 - 350  3 Unit(s) if Glucose 351 - 400  4 Unit(s) if Glucose Greater Than 400  Insulin Lispro Injectable (Humalog):   levETIRAcetam 500 mg oral tablet: 1 tab(s) orally 2 times a day  meropenem 1000 mg intravenous injection: 1000 milligram(s) intravenous every 8 hours  mirtazapine 30 mg oral tablet: 1 tab(s) orally once a day  oxycodone-acetaminophen 5 mg-325 mg oral tablet: 2 tab(s) orally every 4 hours, As needed, Moderate Pain (4 - 6)  pantoprazole 40 mg intravenous injection: 40 milligram(s) intravenous 2 times a day  polyethylene glycol 3350 oral powder for reconstitution: 17 gram(s) orally 2 times a day  QUEtiapine 100 mg oral tablet: 1 tab(s) orally once a day (at bedtime)  senna oral tablet: 2 tab(s) orally once a day (at bedtime)  sodium chloride 0.9% injectable solution: 10 milliliter(s) injectable every 6 hours.  Flush as instructed:   Pre/post blood products, medications, blood draw, and to maintain line patency  tiotropium 18 mcg inhalation capsule: 1 cap(s) inhaled once a day  Tylenol 325 mg oral tablet: 1 tab(s) orally once a day    Note: Pt also receives 650 mg PO Q6H PRN for back pain  vancomycin 1 g intravenous injection: 1 gram(s) intravenous every 12 hours

## 2019-11-06 DIAGNOSIS — F39 UNSPECIFIED MOOD [AFFECTIVE] DISORDER: ICD-10-CM

## 2019-11-06 LAB
ANION GAP SERPL CALC-SCNC: 12 MMOL/L — SIGNIFICANT CHANGE UP (ref 5–17)
BUN SERPL-MCNC: 21 MG/DL — SIGNIFICANT CHANGE UP (ref 7–23)
CALCIUM SERPL-MCNC: 10.2 MG/DL — SIGNIFICANT CHANGE UP (ref 8.4–10.5)
CHLORIDE SERPL-SCNC: 99 MMOL/L — SIGNIFICANT CHANGE UP (ref 96–108)
CO2 SERPL-SCNC: 25 MMOL/L — SIGNIFICANT CHANGE UP (ref 22–31)
CREAT SERPL-MCNC: 1.06 MG/DL — SIGNIFICANT CHANGE UP (ref 0.5–1.3)
GLUCOSE BLDC GLUCOMTR-MCNC: 111 MG/DL — HIGH (ref 70–99)
GLUCOSE BLDC GLUCOMTR-MCNC: 122 MG/DL — HIGH (ref 70–99)
GLUCOSE BLDC GLUCOMTR-MCNC: 124 MG/DL — HIGH (ref 70–99)
GLUCOSE BLDC GLUCOMTR-MCNC: 126 MG/DL — HIGH (ref 70–99)
GLUCOSE SERPL-MCNC: 104 MG/DL — HIGH (ref 70–99)
HCT VFR BLD CALC: 26.9 % — LOW (ref 39–50)
HGB BLD-MCNC: 8.6 G/DL — LOW (ref 13–17)
MCHC RBC-ENTMCNC: 27.9 PG — SIGNIFICANT CHANGE UP (ref 27–34)
MCHC RBC-ENTMCNC: 32 GM/DL — SIGNIFICANT CHANGE UP (ref 32–36)
MCV RBC AUTO: 87.3 FL — SIGNIFICANT CHANGE UP (ref 80–100)
PLATELET # BLD AUTO: 255 K/UL — SIGNIFICANT CHANGE UP (ref 150–400)
POTASSIUM SERPL-MCNC: 4.2 MMOL/L — SIGNIFICANT CHANGE UP (ref 3.5–5.3)
POTASSIUM SERPL-SCNC: 4.2 MMOL/L — SIGNIFICANT CHANGE UP (ref 3.5–5.3)
RBC # BLD: 3.08 M/UL — LOW (ref 4.2–5.8)
RBC # FLD: 14.2 % — SIGNIFICANT CHANGE UP (ref 10.3–14.5)
SODIUM SERPL-SCNC: 136 MMOL/L — SIGNIFICANT CHANGE UP (ref 135–145)
VANCOMYCIN TROUGH SERPL-MCNC: 19.5 UG/ML — SIGNIFICANT CHANGE UP (ref 10–20)
WBC # BLD: 6.35 K/UL — SIGNIFICANT CHANGE UP (ref 3.8–10.5)
WBC # FLD AUTO: 6.35 K/UL — SIGNIFICANT CHANGE UP (ref 3.8–10.5)

## 2019-11-06 PROCEDURE — 93010 ELECTROCARDIOGRAM REPORT: CPT

## 2019-11-06 PROCEDURE — 99223 1ST HOSP IP/OBS HIGH 75: CPT

## 2019-11-06 RX ORDER — QUETIAPINE FUMARATE 200 MG/1
100 TABLET, FILM COATED ORAL AT BEDTIME
Refills: 0 | Status: DISCONTINUED | OUTPATIENT
Start: 2019-11-06 | End: 2019-11-08

## 2019-11-06 RX ORDER — DIVALPROEX SODIUM 500 MG/1
500 TABLET, DELAYED RELEASE ORAL
Refills: 0 | Status: DISCONTINUED | OUTPATIENT
Start: 2019-11-06 | End: 2019-11-08

## 2019-11-06 RX ADMIN — MIRTAZAPINE 30 MILLIGRAM(S): 45 TABLET, ORALLY DISINTEGRATING ORAL at 21:15

## 2019-11-06 RX ADMIN — OXYCODONE AND ACETAMINOPHEN 2 TABLET(S): 5; 325 TABLET ORAL at 23:32

## 2019-11-06 RX ADMIN — MEROPENEM 100 MILLIGRAM(S): 1 INJECTION INTRAVENOUS at 05:29

## 2019-11-06 RX ADMIN — POLYETHYLENE GLYCOL 3350 17 GRAM(S): 17 POWDER, FOR SOLUTION ORAL at 17:58

## 2019-11-06 RX ADMIN — OXYCODONE AND ACETAMINOPHEN 2 TABLET(S): 5; 325 TABLET ORAL at 10:08

## 2019-11-06 RX ADMIN — POLYETHYLENE GLYCOL 3350 17 GRAM(S): 17 POWDER, FOR SOLUTION ORAL at 05:28

## 2019-11-06 RX ADMIN — Medication 12 UNIT(S): at 17:55

## 2019-11-06 RX ADMIN — AMLODIPINE BESYLATE 10 MILLIGRAM(S): 2.5 TABLET ORAL at 05:31

## 2019-11-06 RX ADMIN — DIVALPROEX SODIUM 500 MILLIGRAM(S): 500 TABLET, DELAYED RELEASE ORAL at 11:26

## 2019-11-06 RX ADMIN — Medication 250 MILLIGRAM(S): at 00:06

## 2019-11-06 RX ADMIN — OXYCODONE AND ACETAMINOPHEN 2 TABLET(S): 5; 325 TABLET ORAL at 14:29

## 2019-11-06 RX ADMIN — Medication 325 MILLIGRAM(S): at 11:27

## 2019-11-06 RX ADMIN — LEVETIRACETAM 500 MILLIGRAM(S): 250 TABLET, FILM COATED ORAL at 05:31

## 2019-11-06 RX ADMIN — HYDROMORPHONE HYDROCHLORIDE 1 MILLIGRAM(S): 2 INJECTION INTRAMUSCULAR; INTRAVENOUS; SUBCUTANEOUS at 12:09

## 2019-11-06 RX ADMIN — HYDROMORPHONE HYDROCHLORIDE 1 MILLIGRAM(S): 2 INJECTION INTRAMUSCULAR; INTRAVENOUS; SUBCUTANEOUS at 09:00

## 2019-11-06 RX ADMIN — CHLORHEXIDINE GLUCONATE 1 APPLICATION(S): 213 SOLUTION TOPICAL at 05:28

## 2019-11-06 RX ADMIN — Medication 1 APPLICATION(S): at 11:27

## 2019-11-06 RX ADMIN — QUETIAPINE FUMARATE 100 MILLIGRAM(S): 200 TABLET, FILM COATED ORAL at 21:15

## 2019-11-06 RX ADMIN — GABAPENTIN 300 MILLIGRAM(S): 400 CAPSULE ORAL at 21:15

## 2019-11-06 RX ADMIN — DIVALPROEX SODIUM 500 MILLIGRAM(S): 500 TABLET, DELAYED RELEASE ORAL at 18:15

## 2019-11-06 RX ADMIN — Medication 1 MILLIGRAM(S): at 11:26

## 2019-11-06 RX ADMIN — OXYCODONE AND ACETAMINOPHEN 2 TABLET(S): 5; 325 TABLET ORAL at 18:14

## 2019-11-06 RX ADMIN — HYDROMORPHONE HYDROCHLORIDE 1 MILLIGRAM(S): 2 INJECTION INTRAMUSCULAR; INTRAVENOUS; SUBCUTANEOUS at 16:12

## 2019-11-06 RX ADMIN — TIOTROPIUM BROMIDE 1 CAPSULE(S): 18 CAPSULE ORAL; RESPIRATORY (INHALATION) at 11:26

## 2019-11-06 RX ADMIN — SENNA PLUS 2 TABLET(S): 8.6 TABLET ORAL at 21:15

## 2019-11-06 RX ADMIN — GABAPENTIN 300 MILLIGRAM(S): 400 CAPSULE ORAL at 14:29

## 2019-11-06 RX ADMIN — HYDROMORPHONE HYDROCHLORIDE 1 MILLIGRAM(S): 2 INJECTION INTRAMUSCULAR; INTRAVENOUS; SUBCUTANEOUS at 12:50

## 2019-11-06 RX ADMIN — OXYCODONE AND ACETAMINOPHEN 2 TABLET(S): 5; 325 TABLET ORAL at 10:40

## 2019-11-06 RX ADMIN — HEPARIN SODIUM 5000 UNIT(S): 5000 INJECTION INTRAVENOUS; SUBCUTANEOUS at 05:31

## 2019-11-06 RX ADMIN — Medication 12 UNIT(S): at 12:48

## 2019-11-06 RX ADMIN — OXYCODONE AND ACETAMINOPHEN 2 TABLET(S): 5; 325 TABLET ORAL at 18:50

## 2019-11-06 RX ADMIN — OXYCODONE AND ACETAMINOPHEN 2 TABLET(S): 5; 325 TABLET ORAL at 07:00

## 2019-11-06 RX ADMIN — OXYCODONE AND ACETAMINOPHEN 2 TABLET(S): 5; 325 TABLET ORAL at 22:32

## 2019-11-06 RX ADMIN — PANTOPRAZOLE SODIUM 40 MILLIGRAM(S): 20 TABLET, DELAYED RELEASE ORAL at 05:29

## 2019-11-06 RX ADMIN — HYDROMORPHONE HYDROCHLORIDE 1 MILLIGRAM(S): 2 INJECTION INTRAMUSCULAR; INTRAVENOUS; SUBCUTANEOUS at 05:24

## 2019-11-06 RX ADMIN — HYDROMORPHONE HYDROCHLORIDE 1 MILLIGRAM(S): 2 INJECTION INTRAMUSCULAR; INTRAVENOUS; SUBCUTANEOUS at 16:45

## 2019-11-06 RX ADMIN — Medication 12 UNIT(S): at 08:28

## 2019-11-06 RX ADMIN — MEROPENEM 100 MILLIGRAM(S): 1 INJECTION INTRAVENOUS at 14:28

## 2019-11-06 RX ADMIN — PANTOPRAZOLE SODIUM 40 MILLIGRAM(S): 20 TABLET, DELAYED RELEASE ORAL at 17:58

## 2019-11-06 RX ADMIN — OXYCODONE AND ACETAMINOPHEN 2 TABLET(S): 5; 325 TABLET ORAL at 06:29

## 2019-11-06 RX ADMIN — OXYCODONE AND ACETAMINOPHEN 2 TABLET(S): 5; 325 TABLET ORAL at 14:57

## 2019-11-06 RX ADMIN — CHLORHEXIDINE GLUCONATE 1 APPLICATION(S): 213 SOLUTION TOPICAL at 11:25

## 2019-11-06 RX ADMIN — HEPARIN SODIUM 5000 UNIT(S): 5000 INJECTION INTRAVENOUS; SUBCUTANEOUS at 17:58

## 2019-11-06 RX ADMIN — HYDROMORPHONE HYDROCHLORIDE 1 MILLIGRAM(S): 2 INJECTION INTRAMUSCULAR; INTRAVENOUS; SUBCUTANEOUS at 20:12

## 2019-11-06 RX ADMIN — MEROPENEM 100 MILLIGRAM(S): 1 INJECTION INTRAVENOUS at 22:31

## 2019-11-06 RX ADMIN — HYDROMORPHONE HYDROCHLORIDE 1 MILLIGRAM(S): 2 INJECTION INTRAMUSCULAR; INTRAVENOUS; SUBCUTANEOUS at 08:27

## 2019-11-06 RX ADMIN — HYDROMORPHONE HYDROCHLORIDE 1 MILLIGRAM(S): 2 INJECTION INTRAMUSCULAR; INTRAVENOUS; SUBCUTANEOUS at 04:03

## 2019-11-06 RX ADMIN — HYDROMORPHONE HYDROCHLORIDE 1 MILLIGRAM(S): 2 INJECTION INTRAMUSCULAR; INTRAVENOUS; SUBCUTANEOUS at 20:27

## 2019-11-06 RX ADMIN — HYDROMORPHONE HYDROCHLORIDE 1 MILLIGRAM(S): 2 INJECTION INTRAMUSCULAR; INTRAVENOUS; SUBCUTANEOUS at 00:52

## 2019-11-06 RX ADMIN — INSULIN GLARGINE 30 UNIT(S): 100 INJECTION, SOLUTION SUBCUTANEOUS at 22:32

## 2019-11-06 RX ADMIN — GABAPENTIN 300 MILLIGRAM(S): 400 CAPSULE ORAL at 05:31

## 2019-11-06 RX ADMIN — LEVETIRACETAM 500 MILLIGRAM(S): 250 TABLET, FILM COATED ORAL at 17:58

## 2019-11-06 RX ADMIN — Medication 250 MILLIGRAM(S): at 11:26

## 2019-11-06 NOTE — PROGRESS NOTE ADULT - ASSESSMENT
Assessment  DMT2: 63y Male with DM T2 with hyperglycemia was on oral meds at home, now on basal bolus insulin, sugars improved and trending within acceptable range, no hypoglycemic episode, comfortable, eating full meals, patient in good spirits today, d/c to STR pending.  LE Abscess: S/P IR drainage of abscess, on IV ABx, afebrile, stable, monitored.  HTN: Controlled,  on antihypertensive medications.  GIB: s/p egd/colonoscopy + gastritis, FU GI        Jose Alatorre MD  Cell: [phone]  Office: [phone] Assessment  DMT2: 63y Male with DM T2 with hyperglycemia was on oral meds at home, now on basal bolus insulin, sugars improved and trending within acceptable range, no hypoglycemic episode,  comfortable, eating full meals, patient in good spirits today, d/c to STR pending.  LE Abscess: S/P IR drainage of abscess, on IV ABx, afebrile, stable, monitored.  HTN: Controlled,  on antihypertensive medications.  GIB: s/p egd/colonoscopy + gastritis, FU GI        Jose Alatorre MD  Cell: [phone]  Office: [phone]

## 2019-11-06 NOTE — BEHAVIORAL HEALTH ASSESSMENT NOTE - NSBHCONSULTMEDS_PSY_A_CORE FT
Depakote 500 mg po BID  Seroquel 50 mg po BID (CAN BE SLOWLY TITRATED UP BACK TO HOME DOSE  MG), stop if Qtc >500ms.   Remeron 30 mg po bedtime Depakote 500 mg po BID  Seroquel 100 mg qhs (CAN BE SLOWLY TITRATED UP BACK TO HOME DOSE  MG; this can be done at rehab), stop if Qtc >500ms.   Remeron 30 mg po bedtime

## 2019-11-06 NOTE — BEHAVIORAL HEALTH ASSESSMENT NOTE - COMMENTS ON SUICIDE RISK/PROTECTIVE FACTORS:
Pt is not at acutely elevated risk of self-harm due to no active SI at this time and help seeking behaviour (requested to see psychiatrist so he can be on right doses of medication)

## 2019-11-06 NOTE — BEHAVIORAL HEALTH ASSESSMENT NOTE - NSBHCONSULTFOLLOWAFTERCARE_PSY_A_CORE FT
Continue outpatient follow up as previously scheduled at [facility] or [facility] (Please advice to follow up with one psychiatrist to avoid duplication of services),.

## 2019-11-06 NOTE — PROGRESS NOTE ADULT - SUBJECTIVE AND OBJECTIVE BOX
Patient is a 63y old  Male who presents with a chief complaint of Back pain (22 Oct 2019 08:44)      SUBJECTIVE / OVERNIGHT EVENTS:  No new events, Afebrile  S/P  drainage of psoas abscess   Feels depressed    Review of Systems:   CONSTITUTIONAL: No fever, weight loss, or fatigue  EYES: No eye pain, visual disturbances, or discharge  ENMT:  No difficulty hearing, tinnitus, vertigo; No sinus or throat pain  NECK: No pain or stiffness  BREASTS: No pain, masses, or nipple discharge  RESPIRATORY: No cough, wheezing, chills or hemoptysis; No shortness of breath  CARDIOVASCULAR: No chest pain, palpitations, dizziness, or leg swelling  GASTROINTESTINAL: No abdominal or epigastric pain. No nausea, vomiting, or hematemesis; No diarrhea or constipation. No melena or hematochezia.  GENITOURINARY: No dysuria, frequency, hematuria, or incontinence  NEUROLOGICAL: No headaches, memory loss, loss of strength, numbness, or tremors  SKIN: No itching, burning, rashes, or lesions   LYMPH NODES: No enlarged glands  ENDOCRINE: No heat or cold intolerance; No hair loss  MUSCULOSKELETAL: No joint pain or swelling;   PSYCHIATRIC: No  anxiety, mood swings, or difficulty sleeping  HEME/LYMPH: No easy bruising, or bleeding gums  ALLERY AND IMMUNOLOGIC: No hives or eczema    MEDICATIONS  (STANDING):  amLODIPine   Tablet 10 milliGRAM(s) Oral daily  apixaban 5 milliGRAM(s) Oral every 12 hours  chlorhexidine 4% Liquid 1 Application(s) Topical <User Schedule>  dextrose 5%. 1000 milliLiter(s) (50 mL/Hr) IV Continuous <Continuous>  dextrose 50% Injectable 12.5 Gram(s) IV Push once  dextrose 50% Injectable 25 Gram(s) IV Push once  dextrose 50% Injectable 25 Gram(s) IV Push once  diVALproex  milliGRAM(s) Oral daily  ferrous    sulfate 325 milliGRAM(s) Oral daily  folic acid 1 milliGRAM(s) Oral daily  gabapentin 100 milliGRAM(s) Oral three times a day  insulin glargine Injectable (LANTUS) 26 Unit(s) SubCutaneous at bedtime  insulin lispro (HumaLOG) corrective regimen sliding scale   SubCutaneous three times a day before meals  insulin lispro (HumaLOG) corrective regimen sliding scale   SubCutaneous at bedtime  insulin lispro Injectable (HumaLOG) 10 Unit(s) SubCutaneous three times a day before meals  levETIRAcetam 500 milliGRAM(s) Oral two times a day  mirtazapine 30 milliGRAM(s) Oral daily  piperacillin/tazobactam IVPB.. 3.375 Gram(s) IV Intermittent every 8 hours  tiotropium 18 MICROgram(s) Capsule 1 Capsule(s) Inhalation daily  vancomycin  IVPB 1000 milliGRAM(s) IV Intermittent every 12 hours    MEDICATIONS  (PRN):  acetaminophen   Tablet .. 325 milliGRAM(s) Oral daily PRN Temp greater or equal to 38C (100.4F), Mild Pain (1 - 3)  ALBUTerol    90 MICROgram(s) HFA Inhaler 2 Puff(s) Inhalation every 6 hours PRN Shortness of Breath and/or Wheezing  dextrose 40% Gel 15 Gram(s) Oral once PRN Blood Glucose LESS THAN 70 milliGRAM(s)/deciliter  glucagon  Injectable 1 milliGRAM(s) IntraMuscular once PRN Glucose LESS THAN 70 milligrams/deciliter  HYDROmorphone  Injectable 1 milliGRAM(s) IV Push every 4 hours PRN Severe Pain (7 - 10)  oxyCODONE    5 mG/acetaminophen 325 mG 2 Tablet(s) Oral every 4 hours PRN Moderate Pain (4 - 6)  sodium chloride 0.9% lock flush 10 milliLiter(s) IV Push every 1 hour PRN Pre/post blood products, medications, blood draw, and to maintain line patency      PHYSICAL EXAM:  Vital Signs Last 24 Hrs  T(C): 36.8 (22 Oct 2019 09:12), Max: 37.3 (21 Oct 2019 15:24)  T(F): 98.2 (22 Oct 2019 09:12), Max: 99.1 (21 Oct 2019 15:24)  HR: 89 (22 Oct 2019 09:12) (88 - 99)  BP: 162/87 (22 Oct 2019 09:12) (144/74 - 162/87)  BP(mean): --  RR: 18 (22 Oct 2019 09:12) (18 - 18)  SpO2: 98% (22 Oct 2019 09:12) (96% - 98%)  I&O's Summary    21 Oct 2019 07:01  -  22 Oct 2019 07:00  --------------------------------------------------------  IN: 720 mL / OUT: 650 mL / NET: 70 mL    22 Oct 2019 07:01  -  22 Oct 2019 12:52  --------------------------------------------------------  IN: 600 mL / OUT: 450 mL / NET: 150 mL      GENERAL: NAD, well-developed  HEAD:  Atraumatic, Normocephalic  EYES: EOMI, PERRLA, conjunctiva and sclera clear  NECK: Supple, No JVD  CHEST/LUNG: Clear to auscultation bilaterally; No wheeze  HEART: Regular rate and rhythm; No murmurs, rubs, or gallops  ABDOMEN: Soft, Nontender, Nondistended; Bowel sounds present  EXTREMITIES:  elda TMA  PSYCH: AAOx3  NEUROLOGY: non-focal  SKIN: No rashes or lesions    LABS:  CAPILLARY BLOOD GLUCOSE      POCT Blood Glucose.: 157 mg/dL (22 Oct 2019 11:59)  POCT Blood Glucose.: 280 mg/dL (22 Oct 2019 08:57)  POCT Blood Glucose.: 299 mg/dL (21 Oct 2019 22:05)  POCT Blood Glucose.: 260 mg/dL (21 Oct 2019 17:11)                          8.3    9.26  )-----------( 401      ( 21 Oct 2019 09:40 )             26.8     10-21    135  |  100  |  11  ----------------------------<  184<H>  3.9   |  25  |  1.16    Ca    9.2      21 Oct 2019 09:40    TPro  8.9<H>  /  Alb  3.2<L>  /  TBili  0.3  /  DBili  x   /  AST  13  /  ALT  7<L>  /  AlkPhos  66  10-21    PT/INR - ( 21 Oct 2019 09:40 )   PT: 12.6 sec;   INR: 1.09 ratio         PTT - ( 21 Oct 2019 09:40 )  PTT:30.3 sec          RADIOLOGY & ADDITIONAL TESTS:    Imaging Personally Reviewed:    Consultant(s) Notes Reviewed:      Care Discussed with Consultants/Other Providers:

## 2019-11-06 NOTE — BEHAVIORAL HEALTH ASSESSMENT NOTE - DESCRIPTION (FIRST USE, LAST USE, QUANTITY, FREQUENCY, DURATION)
Former smoker, quit 2 years ago Patient reports daily heavy use of alcohol until 2 years ago. Reports using 10 dollar worth crack daily until 6-7months ago Reports sniffing 10 bags daily, alst sued 6-7 months ago Pt reports getting prescribed klonopin/xanax but denies "illegal use" of them

## 2019-11-06 NOTE — PROVIDER CONTACT NOTE (OTHER) - ACTION/TREATMENT ORDERED:
PA notified. per PA, no need for vancomycin trough since troughs will be done weekly now as per PA.
PRINCESS Mcdowell aware of above-to place order for new PICC line to be placed.
As per ROBERT Vu order pending for trough
NP notified. per NP, don't give the 30 units of lantus, instead, NP to order 15 units to be given with a snack.
administer Lantus 30units as ordered.

## 2019-11-06 NOTE — BEHAVIORAL HEALTH ASSESSMENT NOTE - HPI (INCLUDE ILLNESS QUALITY, SEVERITY, DURATION, TIMING, CONTEXT, MODIFYING FACTORS, ASSOCIATED SIGNS AND SYMPTOMS)
63 y old AA male, single, with 5 adult children living in different [place] in [place], domiciled alone with HHA/visiting nurse service at home, unemployed on SSI, with self-reported PPH of Bipolar disorder, Anxiety, Depression, panic attacks, reports seeing two psychiatrists, at [facility] and [facility], previous trials with different psychotropic medication including Haldol, most recently was prescribed Seroquel 100 mg po in am, 200 mg bedtime, Depakote 500 mg po BID and Remeron 30 mg po bedtime, 2 prior psychiatric admissions, last admitted 2 years ago for depression at [facility], few prior suicidal attempts by OD/cutting, last attempted 10 years ago, h/o polysubstance abuse (Alcohol, Heroin, Crack Cocaine), h/o incarceration for stealing/drug related charges, PMH of DM, HTN, and Epilepsy, who was transferred from [facility] on 10/21 for evaluation of possible discitis/osteomyelitis, following a mechanical fall. Patient was found to have iliopsoas abscess during this admission, received treatment with IV antibiotics and team is planning to transfer patient to rehab facility. Psychiatry is consulted due to patient requesting to see psychiatrist for depression.   Upon evaluation, patient appears A/O x 3, calm and cooperative without any signs of acute psychosis or ximena. He reports worsening of depression “10/10” at present in context of various triggers mostly related to current hospitalization, including poor pain control, having “no foot”, no access to wheel chair which makes him unable to walk and not being on right doses of his home psychotropic regimen. Per patient, “I can’t take it anymore” and states that he had thoughts of hurting himself last night mainly due to being in so much pain, however believes that if his pain gets better and he gets to take right doses of his psychotropic medication, he will no longer feel suicidal. Presently, he denies any suicidal/homicidal ideation. He also denies all forms of hallucinations or ximena symptoms at this time, however reports chronic poor sleep and fatigue. Pt denies any recent changes in appetite.. Patient reports being previously prescribed Klonopin and xanax for anxiety (last took 6-7 months ago), in addition to Dilaudid for pain control and using street Heroin/Crack on daily basis, up until 6-7 months ago. Patient identifies “God” and “Adventism” as his main strengths, which provide him courage to deal with his current situation. He reports seeing 2 different outpatient psychiatrists because "it was convenient", and was most recently prescribed Seroquel 300 mg po bedtime, Depakote 500 mg po BID and Remeron 30 mg bedtime. He reports regular compliance to these medication with good response. 63 y old AA male, single, with 5 adult children living in [place] in [place], domiciled alone with HHA/visiting nurse service at home, unemployed on SSI, with self-reported PPH of Bipolar disorder, Anxiety, Depression, panic attacks, reports seeing two psychiatrists, at [facility] and [facility], previous trials with different psychotropic medication including Haldol, most recently was prescribed Seroquel 100 mg po in am, 200 mg bedtime, Depakote 500 mg po BID and Remeron 30 mg po bedtime, 2 prior psychiatric admissions, last admitted 2 years ago for depression at [facility], few prior suicidal attempts by OD/cutting, last attempted 10 years ago, h/o polysubstance abuse (Alcohol, Heroin, Crack Cocaine), h/o incarceration for stealing/drug related charges, PMH of DM, HTN, and Epilepsy, who was transferred from [facility] on 10/21 for evaluation of possible discitis/osteomyelitis, following a mechanical fall. Patient was found to have iliopsoas abscess during this admission, received treatment with IV antibiotics and team is planning to transfer patient to rehab facility. Psychiatry is consulted due to patient requesting to see psychiatrist for depression.   Upon evaluation, patient appears A/O x 3, calm and cooperative without any signs of acute psychosis or ximena. He reports worsening of depression “10/10” at present in context of various triggers mostly related to current hospitalization, including poor pain control, having “no foot”, no access to wheel chair which makes him unable to walk and not being on right doses of his home psychotropic regimen. Per patient, “I can’t take it anymore” and states that he had thoughts of hurting himself last night mainly due to being in so much pain, however believes that if his pain gets better and he gets to take right doses of his psychotropic medication, he will no longer feel suicidal. Presently, he denies any suicidal/homicidal ideation. He also denies all forms of hallucinations or ximena symptoms at this time, however reports chronic poor sleep and fatigue. Pt denies any recent changes in appetite. Patient reports being previously prescribed Klonopin and xanax for anxiety (last took 6-7 months ago), in addition to Dilaudid for pain control and using street Heroin/Crack on daily basis, up until 6-7 months ago. Patient identifies “God” and “Islam” as his main strengths, which provide him courage to deal with his current situation. He reports seeing 2 different outpatient psychiatrists because "it was convenient", and was most recently prescribed Seroquel 300 mg po bedtime, Depakote 500 mg po BID and Remeron 30 mg bedtime. He reports regular compliance to these medication with good response.

## 2019-11-06 NOTE — BEHAVIORAL HEALTH ASSESSMENT NOTE - NSBHCHARTREVIEWINVESTIGATE_PSY_A_CORE FT
Ventricular Rate 88 BPM    Atrial Rate 88 BPM    P-R Interval 130 ms    QRS Duration 84 ms    Q-T Interval 392 ms    QTC Calculation(Bezet) 474 ms    P Axis 86 degrees    R Axis 40 degrees    T Axis 40 degrees    Diagnosis Line NORMAL SINUS RHYTHM  MODERATE VOLTAGE CRITERIA FOR LVH, MAY BE NORMAL VARIANT  BORDERLINE ECG    Confirmed by ATTENDING, ED ([id number]),  DHARA SMITH ([id number]) on 10/21/2019 9:58:56 AM

## 2019-11-06 NOTE — BEHAVIORAL HEALTH ASSESSMENT NOTE - SUICIDE RISK FACTORS
Alcohol/Substance abuse disorders/Insomnia/Chronic pain/Hopelessness or despair/Current mood episode

## 2019-11-06 NOTE — BEHAVIORAL HEALTH ASSESSMENT NOTE - SUMMARY
63 y old AA male, single, with 5 adult children living in different [place] in [place], domiciled alone with HHA/visiting nurse service at home, unemployed on SSI, with self-reported PPH of Bipolar disorder, Anxiety, Depression, panic attacks, reports seeing two psychiatrists, at [facility] and [facility], previous trials with different psychotropic medication including Haldol, most recently was prescribed Seroquel 100 mg po in am, 200 mg bedtime, Depakote 500 mg po BID and Remeron 30 mg po bedtime, 2 prior psychiatric admissions, last admitted 2 years ago for depression at [facility], few prior suicidal attempts by OD/cutting, last attempted 10 years ago, h/o polysubstance abuse (Alcohol, Heroin, Crack Cocaine), h/o incarceration for stealing/drug related charges, PMH of DM, HTN, and Epilepsy, who was transferred from [facility] on 10/21 for evaluation of possible discitis/osteomyelitis, following a mechanical fall. Patient was found to have iliopsoas abscess during this admission, received treatment with IV antibiotics and team is planning to transfer patient to rehab facility. Psychiatry is consulted due to patient requesting to see psychiatrist for depression.   Patient reports feeling depressed in context of various triggers during current hospitalization, mainly due to poor pain control and not taking right dose of home psychotropic regimen. He reports having suicidal ideation without any specific plan last night due to being in pain, however presently denies any SI/HI as well as denies manic/psychotic complains at this time. Patient does not require psychiatric admission due to being at low acute risk of self harm at this time. We recommend restarting Seroquel at low dose, 25 mg po BID(can be slowly titrated up back to regular dose of 300 mg daily, increasing dose of Depakote 500 mg po BID (which he normally takes) and continuing Remeron 30 mg po bedtime. Please monitor Qtc on EKG and stop Seroquel if Qtc >500ms.

## 2019-11-06 NOTE — BEHAVIORAL HEALTH ASSESSMENT NOTE - SUICIDE PROTECTIVE FACTORS
Taoism beliefs/Supportive social network of family or friends/Cultural, spiritual and/or moral attitudes against suicide

## 2019-11-06 NOTE — BEHAVIORAL HEALTH ASSESSMENT NOTE - NSBHCHARTREVIEWVS_PSY_A_CORE FT
Vital Signs Last 24 Hrs  T(C): 36.5 (06 Nov 2019 13:26), Max: 37 (05 Nov 2019 17:04)  T(F): 97.7 (06 Nov 2019 13:26), Max: 98.6 (05 Nov 2019 17:04)  HR: 79 (06 Nov 2019 13:26) (79 - 98)  BP: 114/72 (06 Nov 2019 13:26) (114/72 - 139/80)  BP(mean): --  RR: 18 (06 Nov 2019 13:26) (18 - 18)  SpO2: 96% (06 Nov 2019 13:26) (95% - 98%)

## 2019-11-06 NOTE — BEHAVIORAL HEALTH ASSESSMENT NOTE - OTHER PAST PSYCHIATRIC HISTORY (INCLUDE DETAILS REGARDING ONSET, COURSE OF ILLNESS, INPATIENT/OUTPATIENT TREATMENT)
Reports h/o Bipolar disorder, Anxiety, panic disorder, since "I was a child", previous trials with various psychotropic medication including Haldol, presently on Seroquel 300 mg bedtime, Depakote 500 mg BID and Remeron 30 mg bedtime. Patient reports 2 prior psychiatric admissions, last admitted 2 years ago at [facility] for depression. He reports previous suicidal attempts by OD/cutting wrist, last attempted 10 years ago. Patient reports seeing a psychiatrist at [facility] and another one at [facility], who as per patient are aware about patient's dual outpatient treatment.

## 2019-11-06 NOTE — BEHAVIORAL HEALTH ASSESSMENT NOTE - OTHER
Bed bound, requires wheel chair at home for ambulation. Patient's main trigger is "pain" and "not taking right doses of medication"

## 2019-11-06 NOTE — BEHAVIORAL HEALTH ASSESSMENT NOTE - RISK ASSESSMENT
Low Acute Suicide Risk Chronic risks : H/o mental illness, psychiatric admissions, SA : mitigated by continuing treatment with psychotropic medication and outpatient psychiatrist follow up.  Other risk factors : Chronic pain, medical comorbidities, can be mitigated by optimizing pain control/continuing treatment. H/o substance abuse, can be mitigated by sobriety.  Protective factors: Help seeking behaviour, residential stability, being Jain, no active substance abuse

## 2019-11-06 NOTE — BEHAVIORAL HEALTH ASSESSMENT NOTE - DIFFERENTIAL
Substance induced mood disorder   Bipolar disorder   Depressive disorder   Personality disorder, Unspecified

## 2019-11-06 NOTE — PROGRESS NOTE ADULT - SUBJECTIVE AND OBJECTIVE BOX
Chief complaint  Patient is a 63y old  Male who presents with a chief complaint of Back pain (05 Nov 2019 22:41)   Review of systems  Patient in bed, looks comfortable, no fever, no hypoglycemia.    Labs and Fingersticks  CAPILLARY BLOOD GLUCOSE      POCT Blood Glucose.: 126 mg/dL (06 Nov 2019 07:59)  POCT Blood Glucose.: 127 mg/dL (05 Nov 2019 21:37)  POCT Blood Glucose.: 121 mg/dL (05 Nov 2019 17:06)      Anion Gap, Serum: 12 (11-06 @ 07:09)      Calcium, Total Serum: 10.2 (11-06 @ 07:09)          11-06    136  |  99  |  21  ----------------------------<  104<H>  4.2   |  25  |  1.06    Ca    10.2      06 Nov 2019 07:09                          8.6    6.35  )-----------( 255      ( 06 Nov 2019 09:05 )             26.9     Medications  MEDICATIONS  (STANDING):  amLODIPine   Tablet 10 milliGRAM(s) Oral daily  chlorhexidine 2% Cloths 1 Application(s) Topical daily  chlorhexidine 4% Liquid 1 Application(s) Topical <User Schedule>  collagenase Ointment 1 Application(s) Topical daily  dextrose 5%. 1000 milliLiter(s) (50 mL/Hr) IV Continuous <Continuous>  dextrose 50% Injectable 12.5 Gram(s) IV Push once  dextrose 50% Injectable 25 Gram(s) IV Push once  dextrose 50% Injectable 25 Gram(s) IV Push once  diVALproex  milliGRAM(s) Oral daily  ferrous    sulfate 325 milliGRAM(s) Oral daily  folic acid 1 milliGRAM(s) Oral daily  gabapentin 300 milliGRAM(s) Oral three times a day  heparin  Injectable 5000 Unit(s) SubCutaneous two times a day  insulin glargine Injectable (LANTUS) 30 Unit(s) SubCutaneous at bedtime  insulin lispro (HumaLOG) corrective regimen sliding scale   SubCutaneous three times a day before meals  insulin lispro (HumaLOG) corrective regimen sliding scale   SubCutaneous at bedtime  insulin lispro Injectable (HumaLOG) 12 Unit(s) SubCutaneous three times a day before meals  levETIRAcetam 500 milliGRAM(s) Oral two times a day  meropenem  IVPB 1000 milliGRAM(s) IV Intermittent every 8 hours  mirtazapine 30 milliGRAM(s) Oral daily  pantoprazole  Injectable 40 milliGRAM(s) IV Push two times a day  polyethylene glycol 3350 17 Gram(s) Oral two times a day  senna 2 Tablet(s) Oral at bedtime  tiotropium 18 MICROgram(s) Capsule 1 Capsule(s) Inhalation daily  vancomycin  IVPB 1000 milliGRAM(s) IV Intermittent every 12 hours      Physical Exam  General: Patient comfortable in bed  Vital Signs Last 12 Hrs  T(F): 97.6 (11-06-19 @ 05:26), Max: 98.1 (11-06-19 @ 01:06)  HR: 86 (11-06-19 @ 05:26) (86 - 87)  BP: 136/83 (11-06-19 @ 05:26) (117/74 - 136/83)  BP(mean): --  RR: 18 (11-06-19 @ 05:26) (18 - 18)  SpO2: 96% (11-06-19 @ 05:26) (95% - 96%)  Neck: No palpable thyroid nodules.  CVS: S1S2, No murmurs  Respiratory: No wheezing, no crepitations  GI: Abdomen soft, bowel sounds positive  Musculoskeletal:  edema lower extremities.   Skin: No skin rashes, no ecchymosis    Diagnostics Chief complaint  Patient is a 63y old  Male who presents with a chief complaint of Back pain (05 Nov 2019 22:41)   Review of systems  Patient in bed, looks comfortable, no fever,  no hypoglycemia.    Labs and Fingersticks  CAPILLARY BLOOD GLUCOSE      POCT Blood Glucose.: 126 mg/dL (06 Nov 2019 07:59)  POCT Blood Glucose.: 127 mg/dL (05 Nov 2019 21:37)  POCT Blood Glucose.: 121 mg/dL (05 Nov 2019 17:06)      Anion Gap, Serum: 12 (11-06 @ 07:09)      Calcium, Total Serum: 10.2 (11-06 @ 07:09)          11-06    136  |  99  |  21  ----------------------------<  104<H>  4.2   |  25  |  1.06    Ca    10.2      06 Nov 2019 07:09                          8.6    6.35  )-----------( 255      ( 06 Nov 2019 09:05 )             26.9     Medications  MEDICATIONS  (STANDING):  amLODIPine   Tablet 10 milliGRAM(s) Oral daily  chlorhexidine 2% Cloths 1 Application(s) Topical daily  chlorhexidine 4% Liquid 1 Application(s) Topical <User Schedule>  collagenase Ointment 1 Application(s) Topical daily  dextrose 5%. 1000 milliLiter(s) (50 mL/Hr) IV Continuous <Continuous>  dextrose 50% Injectable 12.5 Gram(s) IV Push once  dextrose 50% Injectable 25 Gram(s) IV Push once  dextrose 50% Injectable 25 Gram(s) IV Push once  diVALproex  milliGRAM(s) Oral daily  ferrous    sulfate 325 milliGRAM(s) Oral daily  folic acid 1 milliGRAM(s) Oral daily  gabapentin 300 milliGRAM(s) Oral three times a day  heparin  Injectable 5000 Unit(s) SubCutaneous two times a day  insulin glargine Injectable (LANTUS) 30 Unit(s) SubCutaneous at bedtime  insulin lispro (HumaLOG) corrective regimen sliding scale   SubCutaneous three times a day before meals  insulin lispro (HumaLOG) corrective regimen sliding scale   SubCutaneous at bedtime  insulin lispro Injectable (HumaLOG) 12 Unit(s) SubCutaneous three times a day before meals  levETIRAcetam 500 milliGRAM(s) Oral two times a day  meropenem  IVPB 1000 milliGRAM(s) IV Intermittent every 8 hours  mirtazapine 30 milliGRAM(s) Oral daily  pantoprazole  Injectable 40 milliGRAM(s) IV Push two times a day  polyethylene glycol 3350 17 Gram(s) Oral two times a day  senna 2 Tablet(s) Oral at bedtime  tiotropium 18 MICROgram(s) Capsule 1 Capsule(s) Inhalation daily  vancomycin  IVPB 1000 milliGRAM(s) IV Intermittent every 12 hours      Physical Exam  General: Patient comfortable in bed  Vital Signs Last 12 Hrs  T(F): 97.6 (11-06-19 @ 05:26), Max: 98.1 (11-06-19 @ 01:06)  HR: 86 (11-06-19 @ 05:26) (86 - 87)  BP: 136/83 (11-06-19 @ 05:26) (117/74 - 136/83)  BP(mean): --  RR: 18 (11-06-19 @ 05:26) (18 - 18)  SpO2: 96% (11-06-19 @ 05:26) (95% - 96%)  Neck: No palpable thyroid nodules.  CVS: S1S2, No murmurs  Respiratory: No wheezing, no crepitations  GI: Abdomen soft, bowel sounds positive  Musculoskeletal:  edema lower extremities.   Skin: No skin rashes, no ecchymosis    Diagnostics

## 2019-11-06 NOTE — PROGRESS NOTE ADULT - PROBLEM SELECTOR PLAN 1
Will continue current insulin regimen for now. Will continue monitoring FS, log, and FU.  Suggest to d/c to rehab on current insulin regimen and FU endo 4 weeks.  Patient counseled for compliance with consistent low carb diet and exercise as tolerated outpatient. Will continue current insulin regimen for now. Will continue monitoring FS, log, and FU.    Patient counseled for compliance with consistent low carb diet and exercise as tolerated outpatient.

## 2019-11-06 NOTE — BEHAVIORAL HEALTH ASSESSMENT NOTE - NSBHCHARTREVIEWLAB_PSY_A_CORE FT
8.6    6.35  )-----------( 255      ( 06 Nov 2019 09:05 )             26.9     11-06    136  |  99  |  21  ----------------------------<  104<H>  4.2   |  25  |  1.06    Ca    10.2      06 Nov 2019 07:09

## 2019-11-07 LAB
GLUCOSE BLDC GLUCOMTR-MCNC: 100 MG/DL — HIGH (ref 70–99)
GLUCOSE BLDC GLUCOMTR-MCNC: 116 MG/DL — HIGH (ref 70–99)
GLUCOSE BLDC GLUCOMTR-MCNC: 129 MG/DL — HIGH (ref 70–99)
GLUCOSE BLDC GLUCOMTR-MCNC: 149 MG/DL — HIGH (ref 70–99)
GLUCOSE BLDC GLUCOMTR-MCNC: 80 MG/DL — SIGNIFICANT CHANGE UP (ref 70–99)
HCT VFR BLD CALC: 25.3 % — LOW (ref 39–50)
HGB BLD-MCNC: 8 G/DL — LOW (ref 13–17)
HIV 1+2 AB+HIV1 P24 AG SERPL QL IA: SIGNIFICANT CHANGE UP
MCHC RBC-ENTMCNC: 27.6 PG — SIGNIFICANT CHANGE UP (ref 27–34)
MCHC RBC-ENTMCNC: 31.6 GM/DL — LOW (ref 32–36)
MCV RBC AUTO: 87.2 FL — SIGNIFICANT CHANGE UP (ref 80–100)
NRBC # BLD: 0 /100 WBCS — SIGNIFICANT CHANGE UP (ref 0–0)
PLATELET # BLD AUTO: 243 K/UL — SIGNIFICANT CHANGE UP (ref 150–400)
RBC # BLD: 2.9 M/UL — LOW (ref 4.2–5.8)
RBC # FLD: 14.1 % — SIGNIFICANT CHANGE UP (ref 10.3–14.5)
VALPROATE SERPL-MCNC: 5 UG/ML — LOW (ref 50–100)
WBC # BLD: 5.74 K/UL — SIGNIFICANT CHANGE UP (ref 3.8–10.5)
WBC # FLD AUTO: 5.74 K/UL — SIGNIFICANT CHANGE UP (ref 3.8–10.5)

## 2019-11-07 PROCEDURE — 99232 SBSQ HOSP IP/OBS MODERATE 35: CPT

## 2019-11-07 RX ORDER — OXYCODONE AND ACETAMINOPHEN 5; 325 MG/1; MG/1
2 TABLET ORAL EVERY 4 HOURS
Refills: 0 | Status: DISCONTINUED | OUTPATIENT
Start: 2019-11-07 | End: 2019-11-08

## 2019-11-07 RX ADMIN — SENNA PLUS 2 TABLET(S): 8.6 TABLET ORAL at 22:18

## 2019-11-07 RX ADMIN — Medication 250 MILLIGRAM(S): at 11:49

## 2019-11-07 RX ADMIN — OXYCODONE AND ACETAMINOPHEN 2 TABLET(S): 5; 325 TABLET ORAL at 17:00

## 2019-11-07 RX ADMIN — INSULIN GLARGINE 30 UNIT(S): 100 INJECTION, SOLUTION SUBCUTANEOUS at 22:59

## 2019-11-07 RX ADMIN — PANTOPRAZOLE SODIUM 40 MILLIGRAM(S): 20 TABLET, DELAYED RELEASE ORAL at 06:22

## 2019-11-07 RX ADMIN — Medication 325 MILLIGRAM(S): at 11:49

## 2019-11-07 RX ADMIN — POLYETHYLENE GLYCOL 3350 17 GRAM(S): 17 POWDER, FOR SOLUTION ORAL at 17:16

## 2019-11-07 RX ADMIN — HYDROMORPHONE HYDROCHLORIDE 1 MILLIGRAM(S): 2 INJECTION INTRAMUSCULAR; INTRAVENOUS; SUBCUTANEOUS at 21:23

## 2019-11-07 RX ADMIN — HYDROMORPHONE HYDROCHLORIDE 1 MILLIGRAM(S): 2 INJECTION INTRAMUSCULAR; INTRAVENOUS; SUBCUTANEOUS at 00:34

## 2019-11-07 RX ADMIN — OXYCODONE AND ACETAMINOPHEN 2 TABLET(S): 5; 325 TABLET ORAL at 16:06

## 2019-11-07 RX ADMIN — DIVALPROEX SODIUM 500 MILLIGRAM(S): 500 TABLET, DELAYED RELEASE ORAL at 17:16

## 2019-11-07 RX ADMIN — Medication 1 MILLIGRAM(S): at 11:49

## 2019-11-07 RX ADMIN — Medication 250 MILLIGRAM(S): at 00:19

## 2019-11-07 RX ADMIN — OXYCODONE AND ACETAMINOPHEN 2 TABLET(S): 5; 325 TABLET ORAL at 02:39

## 2019-11-07 RX ADMIN — OXYCODONE AND ACETAMINOPHEN 2 TABLET(S): 5; 325 TABLET ORAL at 20:09

## 2019-11-07 RX ADMIN — GABAPENTIN 300 MILLIGRAM(S): 400 CAPSULE ORAL at 06:22

## 2019-11-07 RX ADMIN — HYDROMORPHONE HYDROCHLORIDE 1 MILLIGRAM(S): 2 INJECTION INTRAMUSCULAR; INTRAVENOUS; SUBCUTANEOUS at 13:20

## 2019-11-07 RX ADMIN — OXYCODONE AND ACETAMINOPHEN 2 TABLET(S): 5; 325 TABLET ORAL at 03:39

## 2019-11-07 RX ADMIN — Medication 12 UNIT(S): at 17:16

## 2019-11-07 RX ADMIN — POLYETHYLENE GLYCOL 3350 17 GRAM(S): 17 POWDER, FOR SOLUTION ORAL at 06:22

## 2019-11-07 RX ADMIN — HYDROMORPHONE HYDROCHLORIDE 1 MILLIGRAM(S): 2 INJECTION INTRAMUSCULAR; INTRAVENOUS; SUBCUTANEOUS at 00:49

## 2019-11-07 RX ADMIN — MEROPENEM 100 MILLIGRAM(S): 1 INJECTION INTRAVENOUS at 06:22

## 2019-11-07 RX ADMIN — HYDROMORPHONE HYDROCHLORIDE 1 MILLIGRAM(S): 2 INJECTION INTRAMUSCULAR; INTRAVENOUS; SUBCUTANEOUS at 21:38

## 2019-11-07 RX ADMIN — OXYCODONE AND ACETAMINOPHEN 2 TABLET(S): 5; 325 TABLET ORAL at 21:09

## 2019-11-07 RX ADMIN — Medication 12 UNIT(S): at 13:07

## 2019-11-07 RX ADMIN — OXYCODONE AND ACETAMINOPHEN 2 TABLET(S): 5; 325 TABLET ORAL at 12:30

## 2019-11-07 RX ADMIN — OXYCODONE AND ACETAMINOPHEN 2 TABLET(S): 5; 325 TABLET ORAL at 07:40

## 2019-11-07 RX ADMIN — MIRTAZAPINE 30 MILLIGRAM(S): 45 TABLET, ORALLY DISINTEGRATING ORAL at 22:19

## 2019-11-07 RX ADMIN — PANTOPRAZOLE SODIUM 40 MILLIGRAM(S): 20 TABLET, DELAYED RELEASE ORAL at 17:17

## 2019-11-07 RX ADMIN — OXYCODONE AND ACETAMINOPHEN 2 TABLET(S): 5; 325 TABLET ORAL at 08:30

## 2019-11-07 RX ADMIN — GABAPENTIN 300 MILLIGRAM(S): 400 CAPSULE ORAL at 22:19

## 2019-11-07 RX ADMIN — HYDROMORPHONE HYDROCHLORIDE 1 MILLIGRAM(S): 2 INJECTION INTRAMUSCULAR; INTRAVENOUS; SUBCUTANEOUS at 17:18

## 2019-11-07 RX ADMIN — TIOTROPIUM BROMIDE 1 CAPSULE(S): 18 CAPSULE ORAL; RESPIRATORY (INHALATION) at 11:49

## 2019-11-07 RX ADMIN — Medication 12 UNIT(S): at 08:42

## 2019-11-07 RX ADMIN — LEVETIRACETAM 500 MILLIGRAM(S): 250 TABLET, FILM COATED ORAL at 17:17

## 2019-11-07 RX ADMIN — CHLORHEXIDINE GLUCONATE 1 APPLICATION(S): 213 SOLUTION TOPICAL at 06:23

## 2019-11-07 RX ADMIN — DIVALPROEX SODIUM 500 MILLIGRAM(S): 500 TABLET, DELAYED RELEASE ORAL at 06:23

## 2019-11-07 RX ADMIN — CHLORHEXIDINE GLUCONATE 1 APPLICATION(S): 213 SOLUTION TOPICAL at 11:58

## 2019-11-07 RX ADMIN — AMLODIPINE BESYLATE 10 MILLIGRAM(S): 2.5 TABLET ORAL at 06:21

## 2019-11-07 RX ADMIN — LEVETIRACETAM 500 MILLIGRAM(S): 250 TABLET, FILM COATED ORAL at 06:22

## 2019-11-07 RX ADMIN — HYDROMORPHONE HYDROCHLORIDE 1 MILLIGRAM(S): 2 INJECTION INTRAMUSCULAR; INTRAVENOUS; SUBCUTANEOUS at 04:53

## 2019-11-07 RX ADMIN — MEROPENEM 100 MILLIGRAM(S): 1 INJECTION INTRAVENOUS at 13:07

## 2019-11-07 RX ADMIN — GABAPENTIN 300 MILLIGRAM(S): 400 CAPSULE ORAL at 13:08

## 2019-11-07 RX ADMIN — HYDROMORPHONE HYDROCHLORIDE 1 MILLIGRAM(S): 2 INJECTION INTRAMUSCULAR; INTRAVENOUS; SUBCUTANEOUS at 09:05

## 2019-11-07 RX ADMIN — Medication 1 APPLICATION(S): at 13:07

## 2019-11-07 RX ADMIN — OXYCODONE AND ACETAMINOPHEN 2 TABLET(S): 5; 325 TABLET ORAL at 11:49

## 2019-11-07 RX ADMIN — QUETIAPINE FUMARATE 100 MILLIGRAM(S): 200 TABLET, FILM COATED ORAL at 22:19

## 2019-11-07 RX ADMIN — HEPARIN SODIUM 5000 UNIT(S): 5000 INJECTION INTRAVENOUS; SUBCUTANEOUS at 17:16

## 2019-11-07 RX ADMIN — MEROPENEM 100 MILLIGRAM(S): 1 INJECTION INTRAVENOUS at 22:19

## 2019-11-07 RX ADMIN — HYDROMORPHONE HYDROCHLORIDE 1 MILLIGRAM(S): 2 INJECTION INTRAMUSCULAR; INTRAVENOUS; SUBCUTANEOUS at 04:38

## 2019-11-07 RX ADMIN — HYDROMORPHONE HYDROCHLORIDE 1 MILLIGRAM(S): 2 INJECTION INTRAMUSCULAR; INTRAVENOUS; SUBCUTANEOUS at 13:09

## 2019-11-07 RX ADMIN — HYDROMORPHONE HYDROCHLORIDE 1 MILLIGRAM(S): 2 INJECTION INTRAMUSCULAR; INTRAVENOUS; SUBCUTANEOUS at 17:30

## 2019-11-07 RX ADMIN — HYDROMORPHONE HYDROCHLORIDE 1 MILLIGRAM(S): 2 INJECTION INTRAMUSCULAR; INTRAVENOUS; SUBCUTANEOUS at 08:53

## 2019-11-07 NOTE — PROGRESS NOTE ADULT - SUBJECTIVE AND OBJECTIVE BOX
Patient is a 63y old  Male who presents with a chief complaint of Back pain (22 Oct 2019 08:44)      SUBJECTIVE / OVERNIGHT EVENTS:  No new events, Afebrile  S/P  drainage of psoas abscess   Feels depressed, meds changed by Psychiatry    Review of Systems:   CONSTITUTIONAL: No fever, weight loss, or fatigue  EYES: No eye pain, visual disturbances, or discharge  ENMT:  No difficulty hearing, tinnitus, vertigo; No sinus or throat pain  NECK: No pain or stiffness  BREASTS: No pain, masses, or nipple discharge  RESPIRATORY: No cough, wheezing, chills or hemoptysis; No shortness of breath  CARDIOVASCULAR: No chest pain, palpitations, dizziness, or leg swelling  GASTROINTESTINAL: No abdominal or epigastric pain. No nausea, vomiting, or hematemesis; No diarrhea or constipation. No melena or hematochezia.  GENITOURINARY: No dysuria, frequency, hematuria, or incontinence  NEUROLOGICAL: No headaches, memory loss, loss of strength, numbness, or tremors  SKIN: No itching, burning, rashes, or lesions   LYMPH NODES: No enlarged glands  ENDOCRINE: No heat or cold intolerance; No hair loss  MUSCULOSKELETAL: No joint pain or swelling;   PSYCHIATRIC: No  anxiety, mood swings, or difficulty sleeping  HEME/LYMPH: No easy bruising, or bleeding gums  ALLERY AND IMMUNOLOGIC: No hives or eczema    MEDICATIONS  (STANDING):  amLODIPine   Tablet 10 milliGRAM(s) Oral daily  apixaban 5 milliGRAM(s) Oral every 12 hours  chlorhexidine 4% Liquid 1 Application(s) Topical <User Schedule>  dextrose 5%. 1000 milliLiter(s) (50 mL/Hr) IV Continuous <Continuous>  dextrose 50% Injectable 12.5 Gram(s) IV Push once  dextrose 50% Injectable 25 Gram(s) IV Push once  dextrose 50% Injectable 25 Gram(s) IV Push once  diVALproex  milliGRAM(s) Oral daily  ferrous    sulfate 325 milliGRAM(s) Oral daily  folic acid 1 milliGRAM(s) Oral daily  gabapentin 100 milliGRAM(s) Oral three times a day  insulin glargine Injectable (LANTUS) 26 Unit(s) SubCutaneous at bedtime  insulin lispro (HumaLOG) corrective regimen sliding scale   SubCutaneous three times a day before meals  insulin lispro (HumaLOG) corrective regimen sliding scale   SubCutaneous at bedtime  insulin lispro Injectable (HumaLOG) 10 Unit(s) SubCutaneous three times a day before meals  levETIRAcetam 500 milliGRAM(s) Oral two times a day  mirtazapine 30 milliGRAM(s) Oral daily  piperacillin/tazobactam IVPB.. 3.375 Gram(s) IV Intermittent every 8 hours  tiotropium 18 MICROgram(s) Capsule 1 Capsule(s) Inhalation daily  vancomycin  IVPB 1000 milliGRAM(s) IV Intermittent every 12 hours    MEDICATIONS  (PRN):  acetaminophen   Tablet .. 325 milliGRAM(s) Oral daily PRN Temp greater or equal to 38C (100.4F), Mild Pain (1 - 3)  ALBUTerol    90 MICROgram(s) HFA Inhaler 2 Puff(s) Inhalation every 6 hours PRN Shortness of Breath and/or Wheezing  dextrose 40% Gel 15 Gram(s) Oral once PRN Blood Glucose LESS THAN 70 milliGRAM(s)/deciliter  glucagon  Injectable 1 milliGRAM(s) IntraMuscular once PRN Glucose LESS THAN 70 milligrams/deciliter  HYDROmorphone  Injectable 1 milliGRAM(s) IV Push every 4 hours PRN Severe Pain (7 - 10)  oxyCODONE    5 mG/acetaminophen 325 mG 2 Tablet(s) Oral every 4 hours PRN Moderate Pain (4 - 6)  sodium chloride 0.9% lock flush 10 milliLiter(s) IV Push every 1 hour PRN Pre/post blood products, medications, blood draw, and to maintain line patency      PHYSICAL EXAM:  Vital Signs Last 24 Hrs  T(C): 36.8 (22 Oct 2019 09:12), Max: 37.3 (21 Oct 2019 15:24)  T(F): 98.2 (22 Oct 2019 09:12), Max: 99.1 (21 Oct 2019 15:24)  HR: 89 (22 Oct 2019 09:12) (88 - 99)  BP: 162/87 (22 Oct 2019 09:12) (144/74 - 162/87)  BP(mean): --  RR: 18 (22 Oct 2019 09:12) (18 - 18)  SpO2: 98% (22 Oct 2019 09:12) (96% - 98%)  I&O's Summary    21 Oct 2019 07:01  -  22 Oct 2019 07:00  --------------------------------------------------------  IN: 720 mL / OUT: 650 mL / NET: 70 mL    22 Oct 2019 07:01  -  22 Oct 2019 12:52  --------------------------------------------------------  IN: 600 mL / OUT: 450 mL / NET: 150 mL      GENERAL: NAD, well-developed  HEAD:  Atraumatic, Normocephalic  EYES: EOMI, PERRLA, conjunctiva and sclera clear  NECK: Supple, No JVD  CHEST/LUNG: Clear to auscultation bilaterally; No wheeze  HEART: Regular rate and rhythm; No murmurs, rubs, or gallops  ABDOMEN: Soft, Nontender, Nondistended; Bowel sounds present  EXTREMITIES:  elda TMA  PSYCH: AAOx3  NEUROLOGY: non-focal  SKIN: No rashes or lesions    LABS:  CAPILLARY BLOOD GLUCOSE      POCT Blood Glucose.: 157 mg/dL (22 Oct 2019 11:59)  POCT Blood Glucose.: 280 mg/dL (22 Oct 2019 08:57)  POCT Blood Glucose.: 299 mg/dL (21 Oct 2019 22:05)  POCT Blood Glucose.: 260 mg/dL (21 Oct 2019 17:11)                          8.3    9.26  )-----------( 401      ( 21 Oct 2019 09:40 )             26.8     10-21    135  |  100  |  11  ----------------------------<  184<H>  3.9   |  25  |  1.16    Ca    9.2      21 Oct 2019 09:40    TPro  8.9<H>  /  Alb  3.2<L>  /  TBili  0.3  /  DBili  x   /  AST  13  /  ALT  7<L>  /  AlkPhos  66  10-21    PT/INR - ( 21 Oct 2019 09:40 )   PT: 12.6 sec;   INR: 1.09 ratio         PTT - ( 21 Oct 2019 09:40 )  PTT:30.3 sec          RADIOLOGY & ADDITIONAL TESTS:    Imaging Personally Reviewed:    Consultant(s) Notes Reviewed:      Care Discussed with Consultants/Other Providers:

## 2019-11-07 NOTE — PROGRESS NOTE ADULT - PROBLEM SELECTOR PLAN 1
Will continue current insulin regimen for now. Will continue monitoring FS, log, and FU.  Suggest to d/c to rehab on current insulin regimen and FU endo 4 weeks.    Patient counseled for compliance with consistent low carb diet and exercise as tolerated outpatient. Will continue current insulin regimen for now. Will continue monitoring FS, log, and FU.

## 2019-11-07 NOTE — PROGRESS NOTE ADULT - NSHPATTENDINGPLANDISCUSS_GEN_ALL_CORE
Radiology, Medicine NP
medicine np
Patient
Patient, Floor NP

## 2019-11-07 NOTE — PROGRESS NOTE ADULT - PROBLEM SELECTOR PLAN 5
Stop Eliquis. Dopplers pending to assess DVT. Transfuse PRBC today
Stop Eliquis.  Monitor CBC. EGD shows gastritis. Probably this was the source of bleeding which now stopped after stopping anticoagulation
Stop Eliquis.  Monitor CBC. EGD today
Stop Eliquis.  Monitor CBC. EGD shows gastritis. Probably this was the source of bleeding which now stopped after stopping anticoagulation
Stop Eliquis.  Monitor CBC. EGD shows gastritis. Probably this was the source of bleeding which now stopped after stopping anticoagulation.
Stop Eliquis.  Monitor CBC. EGD shows gastritis. Probably this was the source of bleeding which now stopped after stopping anticoagulation
monitor CBC. May have to reconsider Apixiban if he does not have acute DVT. GI Evaluation called
stable CBC
stable CBC
stable CBC  transfuse if Hb <8
stable CBC  transfuse if Hb <8.

## 2019-11-07 NOTE — PROGRESS NOTE ADULT - ASSESSMENT
Assessment  DMT2: 63y Male with DM T2 with hyperglycemia was on oral meds at home, now on basal bolus insulin, sugars improved and trending within acceptable range, no hypoglycemic episode, eating full meals, comfortable, planning d/c to STR.  LE Abscess: S/P IR drainage of abscess, on ABx, afebrile, stable, monitored.  HTN: Controlled,  on antihypertensive medications.  GIB: s/p egd/colonoscopy + gastritis, FU GI        Jose Alatorre MD  Cell: [phone]  Office: [phone] Assessment  DMT2: 63y Male with DM T2 with hyperglycemia was on oral meds at home, now on basal bolus insulin, sugars improved and trending within acceptable range, no hypoglycemic episode, eating full meals,  comfortable, planning d/c to STR.  LE Abscess: S/P IR drainage of abscess, on ABx, afebrile, stable, monitored.  HTN: Controlled,  on antihypertensive medications.  GIB: s/p egd/colonoscopy + gastritis, FU GI        Jose Alatorre MD  Cell: [phone]  Office: [phone]

## 2019-11-07 NOTE — PROGRESS NOTE ADULT - ATTENDING COMMENTS
Marquita Lan  Pager: [phone]. If no response or past 5 pm call [phone].
Marquita Lan  Pager: [phone]. If no response or past 5 pm call [phone].    Will sign off, please call with questions.
Marquita Lan  Pager: [phone]. If no response or past 5 pm call [phone].
Marquita Lan  Pager: [phone]. If no response or past 5 pm call [phone].
Marquita Lan  Pager: [phone]. If no response or past 5 pm call [phone].
Marquita Lan  Pager: [phone]. If no response or past 5 pm call [phone].
Marquita Lan  Pager: [phone]. If no response or past 5 pm call [phone].     Please call ID service for questions over weekend at [phone].
Marquita Lan  Pager: [phone]. If no response or past 5 pm call [phone].
Marquita Lan  Pager: [phone]. If no response or past 5 pm call [phone].
Marquita Lan  Pager: [phone]. If no response or past 5 pm call [phone].
Marquita Lan  Pager: [phone]. If no response or past 5 pm call [phone].
Marquita Lan  Pager: [phone]. If no response or past 5 pm call [phone].
Patient seen at bedside.  Extensive wounds on both feet, R Xray with possible OM, left xray pending.  Patient currently undergoing treatment at [facility].  Patient understands at risk for BKA on the right.  Left foot with possible underlying OM however patient not interested in further workup and/or amputation, wishes to give antibiotics more time.  Podiatry will follow, no acute surgical plan at this time unless patient changes his mind and allows for further workup.
oral
Depression: Psych recommendations noted
Depression: Psych recommendations noted  No evidence of Hep C RNA level undetectable as per labs drawn on 10/22/19. No need for treatment
Will continue current insulin regimen for now. Will continue monitoring FS, log, and FU.
Will increase Lantus to 30u at bedtime, increase Humalog to 12u before each meal, and continue coverage scale.
Patient counseled for compliance with consistent low carb diet and exercise as tolerated outpatient.
Patient counseled for compliance with consistent low carb diet.
Suggest to d/c to rehab on current insulin regimen and FU endo 4 weeks.
Suggest to d/c to rehab on current insulin regimen and FU endo 4 weeks.    Patient counseled for compliance with consistent low carb diet and exercise as tolerated outpatient.
Will continue current insulin regimen for now. Will continue monitoring FS, log, and FU.

## 2019-11-07 NOTE — PROGRESS NOTE ADULT - SUBJECTIVE AND OBJECTIVE BOX
Patient is a 63y old  Male who presents with a chief complaint of Back pain (07 Nov 2019 12:13)       INTERVAL HPI/OVERNIGHT EVENTS:  Patient seen and evaluated at bedside.  Pt is resting comfortable in NAD. Denies N/V/F/C.      Allergies    No Known Allergies    Intolerances        Vital Signs Last 24 Hrs  T(C): 36.8 (07 Nov 2019 04:34), Max: 37 (07 Nov 2019 00:34)  T(F): 98.3 (07 Nov 2019 04:34), Max: 98.6 (07 Nov 2019 00:34)  HR: 100 (07 Nov 2019 04:34) (75 - 100)  BP: 103/65 (07 Nov 2019 04:34) (103/65 - 137/71)  BP(mean): --  RR: 18 (07 Nov 2019 04:34) (18 - 18)  SpO2: 94% (07 Nov 2019 04:34) (94% - 98%)    LABS:                        8.0    5.74  )-----------( 243      ( 07 Nov 2019 06:52 )             25.3     11-06    136  |  99  |  21  ----------------------------<  104<H>  4.2   |  25  |  1.06    Ca    10.2      06 Nov 2019 07:09          CAPILLARY BLOOD GLUCOSE      POCT Blood Glucose.: 149 mg/dL (07 Nov 2019 12:15)  POCT Blood Glucose.: 116 mg/dL (07 Nov 2019 08:25)  POCT Blood Glucose.: 111 mg/dL (06 Nov 2019 22:00)  POCT Blood Glucose.: 122 mg/dL (06 Nov 2019 17:03)  POCT Blood Glucose.: 124 mg/dL (06 Nov 2019 12:34)      Lower Extremity Physical Exam:  Vasular: DP/PT 2/4, B/L, non palpable DP Right foot, CFT < 3 seconds L, Temperature gradient wnl B/L.   Neuro: Epicritic sensation absent to the level of ankle B/L.  Musculoskeletal/Ortho: s/p Right foot choparts amputation and Left foot 1st toe amp  Skin: Right foot plantar Right foot stump ulceration w/ granular base, track to bone mostly covered by soft tissue, no purulence, no malodor, no cellulitis, no signs of infection   Left foot dorsal and plantar 3rd metatarsal head ulcer with granular base, increase granulation tissue, tracks to bone, no purulence/malodor/signs of infection    RADIOLOGY & ADDITIONAL TESTS:

## 2019-11-07 NOTE — PROGRESS NOTE ADULT - ASSESSMENT
64 yo male with DM, HTN, and epilepsy was transferred from [facility] for possible T12-L1 discitis / osteomyelitis following a mechanical fall 5 days ago. Currently receiving IV Vanc/Zosyn for right foot osteomyelitis. Was seen at [facility] 10/20 where a CT abd/pelvis found an enlarged psoas muscle with ? abscesses with possible extension to the T12 L1 disc with a compression fracture suspicious for osteomyelitis.      Overall foot OM, psoas abscess/spinal OM, elevated ESR. Uncontrolled DM.       Plan:  - s/p IR guided aspiration of purulent fluid, bacterial, fungal and AFB cx NTD, cytology consistent with abscess.   - f/u BCx, NTD.   - c/w Meropenem 1 gm iv q8h   - C/W Vanc 1g q12.   - trough therapeutic   - S/p Neurosurgery eval   - s/p podiatry eval.   - Positive Hep C serology, viral load almost undetectable.   - will need 6 weeks therapy until 12/15/19, CBC/CMP once a week while on OPAT.  - CBC/CMP/Vanco trough once a week while on abx, labs to be followed by rehab physician.   - Pt to follow up for repeat MRI after completion of therapy.     Pt pending placement.

## 2019-11-07 NOTE — PROGRESS NOTE ADULT - ASSESSMENT
63M with bilateral foot ulcerations, chronic   -Pt seen and evaluated   -Chronic ulcerations of bilateral feet, increase granulation, stable with no signs of infection   -No plan for podiatric surgical intervention   -Continue with santyl + DSD to b/l feet daily  -stable for discharge from podiatric standpoint   -Seen w/ attending

## 2019-11-07 NOTE — PROGRESS NOTE ADULT - PROBLEM SELECTOR PROBLEM 5
Anemia due to blood loss

## 2019-11-07 NOTE — PROGRESS NOTE ADULT - SUBJECTIVE AND OBJECTIVE BOX
Chief complaint  Patient is a 63y old  Male who presents with a chief complaint of Back pain (07 Nov 2019 12:25)   Review of systems  Patient in bed, looks comfortable, no fever, no hypoglycemia.    Labs and Fingersticks  CAPILLARY BLOOD GLUCOSE      POCT Blood Glucose.: 149 mg/dL (07 Nov 2019 12:15)  POCT Blood Glucose.: 116 mg/dL (07 Nov 2019 08:25)  POCT Blood Glucose.: 111 mg/dL (06 Nov 2019 22:00)  POCT Blood Glucose.: 122 mg/dL (06 Nov 2019 17:03)      Anion Gap, Serum: 12 (11-06 @ 07:09)      Calcium, Total Serum: 10.2 (11-06 @ 07:09)          11-06    136  |  99  |  21  ----------------------------<  104<H>  4.2   |  25  |  1.06    Ca    10.2      06 Nov 2019 07:09                          8.0    5.74  )-----------( 243      ( 07 Nov 2019 06:52 )             25.3     Medications  MEDICATIONS  (STANDING):  amLODIPine   Tablet 10 milliGRAM(s) Oral daily  chlorhexidine 2% Cloths 1 Application(s) Topical daily  chlorhexidine 4% Liquid 1 Application(s) Topical <User Schedule>  collagenase Ointment 1 Application(s) Topical daily  dextrose 5%. 1000 milliLiter(s) (50 mL/Hr) IV Continuous <Continuous>  dextrose 50% Injectable 12.5 Gram(s) IV Push once  dextrose 50% Injectable 25 Gram(s) IV Push once  dextrose 50% Injectable 25 Gram(s) IV Push once  diVALproex  milliGRAM(s) Oral <User Schedule>  ferrous    sulfate 325 milliGRAM(s) Oral daily  folic acid 1 milliGRAM(s) Oral daily  gabapentin 300 milliGRAM(s) Oral three times a day  heparin  Injectable 5000 Unit(s) SubCutaneous two times a day  insulin glargine Injectable (LANTUS) 30 Unit(s) SubCutaneous at bedtime  insulin lispro (HumaLOG) corrective regimen sliding scale   SubCutaneous three times a day before meals  insulin lispro (HumaLOG) corrective regimen sliding scale   SubCutaneous at bedtime  insulin lispro Injectable (HumaLOG) 12 Unit(s) SubCutaneous three times a day before meals  levETIRAcetam 500 milliGRAM(s) Oral two times a day  meropenem  IVPB 1000 milliGRAM(s) IV Intermittent every 8 hours  mirtazapine 30 milliGRAM(s) Oral daily  pantoprazole  Injectable 40 milliGRAM(s) IV Push two times a day  polyethylene glycol 3350 17 Gram(s) Oral two times a day  QUEtiapine 100 milliGRAM(s) Oral at bedtime  senna 2 Tablet(s) Oral at bedtime  tiotropium 18 MICROgram(s) Capsule 1 Capsule(s) Inhalation daily  vancomycin  IVPB 1000 milliGRAM(s) IV Intermittent every 12 hours      Physical Exam  General: Patient comfortable in bed  Vital Signs Last 12 Hrs  T(F): 98.3 (11-07-19 @ 04:34), Max: 98.3 (11-07-19 @ 04:34)  HR: 100 (11-07-19 @ 04:34) (100 - 100)  BP: 103/65 (11-07-19 @ 04:34) (103/65 - 103/65)  BP(mean): --  RR: 18 (11-07-19 @ 04:34) (18 - 18)  SpO2: 94% (11-07-19 @ 04:34) (94% - 94%)  Neck: No palpable thyroid nodules.  CVS: S1S2, No murmurs  Respiratory: No wheezing, no crepitations  GI: Abdomen soft, bowel sounds positive  Musculoskeletal:  edema lower extremities.   Skin: No skin rashes, no ecchymosis    Diagnostics Chief complaint  Patient is a 63y old  Male who presents with a chief complaint of Back pain (07 Nov 2019 12:25)   Review of systems  Patient in bed, looks comfortable, no fever,  no hypoglycemia.    Labs and Fingersticks  CAPILLARY BLOOD GLUCOSE      POCT Blood Glucose.: 149 mg/dL (07 Nov 2019 12:15)  POCT Blood Glucose.: 116 mg/dL (07 Nov 2019 08:25)  POCT Blood Glucose.: 111 mg/dL (06 Nov 2019 22:00)  POCT Blood Glucose.: 122 mg/dL (06 Nov 2019 17:03)      Anion Gap, Serum: 12 (11-06 @ 07:09)      Calcium, Total Serum: 10.2 (11-06 @ 07:09)          11-06    136  |  99  |  21  ----------------------------<  104<H>  4.2   |  25  |  1.06    Ca    10.2      06 Nov 2019 07:09                          8.0    5.74  )-----------( 243      ( 07 Nov 2019 06:52 )             25.3     Medications  MEDICATIONS  (STANDING):  amLODIPine   Tablet 10 milliGRAM(s) Oral daily  chlorhexidine 2% Cloths 1 Application(s) Topical daily  chlorhexidine 4% Liquid 1 Application(s) Topical <User Schedule>  collagenase Ointment 1 Application(s) Topical daily  dextrose 5%. 1000 milliLiter(s) (50 mL/Hr) IV Continuous <Continuous>  dextrose 50% Injectable 12.5 Gram(s) IV Push once  dextrose 50% Injectable 25 Gram(s) IV Push once  dextrose 50% Injectable 25 Gram(s) IV Push once  diVALproex  milliGRAM(s) Oral <User Schedule>  ferrous    sulfate 325 milliGRAM(s) Oral daily  folic acid 1 milliGRAM(s) Oral daily  gabapentin 300 milliGRAM(s) Oral three times a day  heparin  Injectable 5000 Unit(s) SubCutaneous two times a day  insulin glargine Injectable (LANTUS) 30 Unit(s) SubCutaneous at bedtime  insulin lispro (HumaLOG) corrective regimen sliding scale   SubCutaneous three times a day before meals  insulin lispro (HumaLOG) corrective regimen sliding scale   SubCutaneous at bedtime  insulin lispro Injectable (HumaLOG) 12 Unit(s) SubCutaneous three times a day before meals  levETIRAcetam 500 milliGRAM(s) Oral two times a day  meropenem  IVPB 1000 milliGRAM(s) IV Intermittent every 8 hours  mirtazapine 30 milliGRAM(s) Oral daily  pantoprazole  Injectable 40 milliGRAM(s) IV Push two times a day  polyethylene glycol 3350 17 Gram(s) Oral two times a day  QUEtiapine 100 milliGRAM(s) Oral at bedtime  senna 2 Tablet(s) Oral at bedtime  tiotropium 18 MICROgram(s) Capsule 1 Capsule(s) Inhalation daily  vancomycin  IVPB 1000 milliGRAM(s) IV Intermittent every 12 hours      Physical Exam  General: Patient comfortable in bed  Vital Signs Last 12 Hrs  T(F): 98.3 (11-07-19 @ 04:34), Max: 98.3 (11-07-19 @ 04:34)  HR: 100 (11-07-19 @ 04:34) (100 - 100)  BP: 103/65 (11-07-19 @ 04:34) (103/65 - 103/65)  BP(mean): --  RR: 18 (11-07-19 @ 04:34) (18 - 18)  SpO2: 94% (11-07-19 @ 04:34) (94% - 94%)  Neck: No palpable thyroid nodules.  CVS: S1S2, No murmurs  Respiratory: No wheezing, no crepitations  GI: Abdomen soft, bowel sounds positive  Musculoskeletal:  edema lower extremities.   Skin: No skin rashes, no ecchymosis    Diagnostics

## 2019-11-07 NOTE — PROGRESS NOTE ADULT - SUBJECTIVE AND OBJECTIVE BOX
63y old  Male who presents with a chief complaint of Back pain (07 Nov 2019 12:53)      Interval history:  Afebrile, sitting in chair, still with back pain.       No Known Allergies      Antimicrobials:  meropenem  IVPB 1000 milliGRAM(s) IV Intermittent every 8 hours  vancomycin  IVPB 1000 milliGRAM(s) IV Intermittent every 12 hours      REVIEW OF SYSTEMS:  No chest pain or palpitations  No cough, no SOB  No N/V  No dysuria   No rash.       Vital Signs Last 24 Hrs  T(C): 36.8 (11-07-19 @ 13:26), Max: 37 (11-07-19 @ 00:34)  T(F): 98.3 (11-07-19 @ 13:26), Max: 98.6 (11-07-19 @ 00:34)  HR: 84 (11-07-19 @ 13:26) (75 - 100)  BP: 119/79 (11-07-19 @ 13:26) (103/65 - 137/71)  BP(mean): --  RR: 18 (11-07-19 @ 13:26) (18 - 18)  SpO2: 98% (11-07-19 @ 13:26) (94% - 98%)      PHYSICAL EXAM:  Patient in no acute distress. Alert, awake. In chair today   Cardiovascular: S1S2 normal.  Lungs: + air entry B/L lung fields.  Gastrointestinal: soft, nontender, nondistended.  Extremities: no edema.  + Rt arm PICC                           8.0    5.74  )-----------( 243      ( 07 Nov 2019 06:52 )             25.3   11-06    136  |  99  |  21  ----------------------------<  104<H>  4.2   |  25  |  1.06    Ca    10.2      06 Nov 2019 07:09

## 2019-11-08 VITALS
TEMPERATURE: 98 F | RESPIRATION RATE: 18 BRPM | DIASTOLIC BLOOD PRESSURE: 74 MMHG | OXYGEN SATURATION: 97 % | HEART RATE: 82 BPM | SYSTOLIC BLOOD PRESSURE: 122 MMHG

## 2019-11-08 LAB
GLUCOSE BLDC GLUCOMTR-MCNC: 123 MG/DL — HIGH (ref 70–99)
GLUCOSE BLDC GLUCOMTR-MCNC: 126 MG/DL — HIGH (ref 70–99)
VANCOMYCIN TROUGH SERPL-MCNC: 18.3 UG/ML — SIGNIFICANT CHANGE UP (ref 10–20)

## 2019-11-08 PROCEDURE — 87206 SMEAR FLUORESCENT/ACID STAI: CPT

## 2019-11-08 PROCEDURE — 87070 CULTURE OTHR SPECIMN AEROBIC: CPT

## 2019-11-08 PROCEDURE — 87205 SMEAR GRAM STAIN: CPT

## 2019-11-08 PROCEDURE — 36569 INSJ PICC 5 YR+ W/O IMAGING: CPT

## 2019-11-08 PROCEDURE — 73630 X-RAY EXAM OF FOOT: CPT

## 2019-11-08 PROCEDURE — 86901 BLOOD TYPING SEROLOGIC RH(D): CPT

## 2019-11-08 PROCEDURE — 72148 MRI LUMBAR SPINE W/O DYE: CPT

## 2019-11-08 PROCEDURE — 86140 C-REACTIVE PROTEIN: CPT

## 2019-11-08 PROCEDURE — 96376 TX/PRO/DX INJ SAME DRUG ADON: CPT

## 2019-11-08 PROCEDURE — 80164 ASSAY DIPROPYLACETIC ACD TOT: CPT

## 2019-11-08 PROCEDURE — 83605 ASSAY OF LACTIC ACID: CPT

## 2019-11-08 PROCEDURE — 84295 ASSAY OF SERUM SODIUM: CPT

## 2019-11-08 PROCEDURE — 85730 THROMBOPLASTIN TIME PARTIAL: CPT

## 2019-11-08 PROCEDURE — G0103: CPT

## 2019-11-08 PROCEDURE — 82272 OCCULT BLD FECES 1-3 TESTS: CPT

## 2019-11-08 PROCEDURE — C1751: CPT

## 2019-11-08 PROCEDURE — P9016: CPT

## 2019-11-08 PROCEDURE — 93005 ELECTROCARDIOGRAM TRACING: CPT

## 2019-11-08 PROCEDURE — 85610 PROTHROMBIN TIME: CPT

## 2019-11-08 PROCEDURE — 97110 THERAPEUTIC EXERCISES: CPT

## 2019-11-08 PROCEDURE — 77012 CT SCAN FOR NEEDLE BIOPSY: CPT

## 2019-11-08 PROCEDURE — 87075 CULTR BACTERIA EXCEPT BLOOD: CPT

## 2019-11-08 PROCEDURE — 85652 RBC SED RATE AUTOMATED: CPT

## 2019-11-08 PROCEDURE — 36430 TRANSFUSION BLD/BLD COMPNT: CPT

## 2019-11-08 PROCEDURE — 80048 BASIC METABOLIC PNL TOTAL CA: CPT

## 2019-11-08 PROCEDURE — 87116 MYCOBACTERIA CULTURE: CPT

## 2019-11-08 PROCEDURE — 80053 COMPREHEN METABOLIC PANEL: CPT

## 2019-11-08 PROCEDURE — 86923 COMPATIBILITY TEST ELECTRIC: CPT

## 2019-11-08 PROCEDURE — 74181 MRI ABDOMEN W/O CONTRAST: CPT

## 2019-11-08 PROCEDURE — 82962 GLUCOSE BLOOD TEST: CPT

## 2019-11-08 PROCEDURE — 87902 NFCT AGT GNTYP ALYS HEP C: CPT

## 2019-11-08 PROCEDURE — 71045 X-RAY EXAM CHEST 1 VIEW: CPT

## 2019-11-08 PROCEDURE — 82803 BLOOD GASES ANY COMBINATION: CPT

## 2019-11-08 PROCEDURE — 86803 HEPATITIS C AB TEST: CPT

## 2019-11-08 PROCEDURE — 82330 ASSAY OF CALCIUM: CPT

## 2019-11-08 PROCEDURE — 87102 FUNGUS ISOLATION CULTURE: CPT

## 2019-11-08 PROCEDURE — 97530 THERAPEUTIC ACTIVITIES: CPT

## 2019-11-08 PROCEDURE — 97162 PT EVAL MOD COMPLEX 30 MIN: CPT

## 2019-11-08 PROCEDURE — 82435 ASSAY OF BLOOD CHLORIDE: CPT

## 2019-11-08 PROCEDURE — C1729: CPT

## 2019-11-08 PROCEDURE — 85027 COMPLETE CBC AUTOMATED: CPT

## 2019-11-08 PROCEDURE — A9585: CPT

## 2019-11-08 PROCEDURE — 80202 ASSAY OF VANCOMYCIN: CPT

## 2019-11-08 PROCEDURE — 76700 US EXAM ABDOM COMPLETE: CPT

## 2019-11-08 PROCEDURE — 85014 HEMATOCRIT: CPT

## 2019-11-08 PROCEDURE — 87521 HEPATITIS C PROBE&RVRS TRNSC: CPT

## 2019-11-08 PROCEDURE — 86900 BLOOD TYPING SEROLOGIC ABO: CPT

## 2019-11-08 PROCEDURE — 99285 EMERGENCY DEPT VISIT HI MDM: CPT | Mod: 25

## 2019-11-08 PROCEDURE — 83036 HEMOGLOBIN GLYCOSYLATED A1C: CPT

## 2019-11-08 PROCEDURE — 72157 MRI CHEST SPINE W/O & W/DYE: CPT

## 2019-11-08 PROCEDURE — 87522 HEPATITIS C REVRS TRNSCRPJ: CPT

## 2019-11-08 PROCEDURE — 84132 ASSAY OF SERUM POTASSIUM: CPT

## 2019-11-08 PROCEDURE — 87389 HIV-1 AG W/HIV-1&-2 AB AG IA: CPT

## 2019-11-08 PROCEDURE — 82947 ASSAY GLUCOSE BLOOD QUANT: CPT

## 2019-11-08 PROCEDURE — 88312 SPECIAL STAINS GROUP 1: CPT

## 2019-11-08 PROCEDURE — 10160 PNXR ASPIR ABSC HMTMA BULLA: CPT

## 2019-11-08 PROCEDURE — 87040 BLOOD CULTURE FOR BACTERIA: CPT

## 2019-11-08 PROCEDURE — 93970 EXTREMITY STUDY: CPT

## 2019-11-08 PROCEDURE — 96375 TX/PRO/DX INJ NEW DRUG ADDON: CPT | Mod: XU

## 2019-11-08 PROCEDURE — 86850 RBC ANTIBODY SCREEN: CPT

## 2019-11-08 PROCEDURE — 96374 THER/PROPH/DIAG INJ IV PUSH: CPT | Mod: XU

## 2019-11-08 PROCEDURE — 94640 AIRWAY INHALATION TREATMENT: CPT

## 2019-11-08 PROCEDURE — 88305 TISSUE EXAM BY PATHOLOGIST: CPT

## 2019-11-08 PROCEDURE — 72158 MRI LUMBAR SPINE W/O & W/DYE: CPT

## 2019-11-08 RX ORDER — FOLIC ACID 0.8 MG
1 TABLET ORAL
Qty: 0 | Refills: 0 | DISCHARGE

## 2019-11-08 RX ORDER — TIOTROPIUM BROMIDE 18 UG/1
1 CAPSULE ORAL; RESPIRATORY (INHALATION)
Qty: 0 | Refills: 0 | DISCHARGE
Start: 2019-11-08

## 2019-11-08 RX ORDER — FOLIC ACID 0.8 MG
1 TABLET ORAL
Qty: 0 | Refills: 0 | DISCHARGE
Start: 2019-11-08

## 2019-11-08 RX ORDER — IPRATROPIUM/ALBUTEROL SULFATE 18-103MCG
3 AEROSOL WITH ADAPTER (GRAM) INHALATION
Qty: 0 | Refills: 0 | DISCHARGE

## 2019-11-08 RX ORDER — AMLODIPINE BESYLATE 2.5 MG/1
1 TABLET ORAL
Qty: 0 | Refills: 0 | DISCHARGE

## 2019-11-08 RX ORDER — GABAPENTIN 400 MG/1
1 CAPSULE ORAL
Qty: 0 | Refills: 0 | DISCHARGE

## 2019-11-08 RX ORDER — DIVALPROEX SODIUM 500 MG/1
1 TABLET, DELAYED RELEASE ORAL
Qty: 0 | Refills: 0 | DISCHARGE
Start: 2019-11-08

## 2019-11-08 RX ORDER — PANTOPRAZOLE SODIUM 20 MG/1
40 TABLET, DELAYED RELEASE ORAL
Qty: 0 | Refills: 0 | DISCHARGE
Start: 2019-11-08

## 2019-11-08 RX ORDER — MEROPENEM 1 G/30ML
1000 INJECTION INTRAVENOUS
Qty: 0 | Refills: 0 | DISCHARGE
Start: 2019-11-08

## 2019-11-08 RX ORDER — PIPERACILLIN AND TAZOBACTAM 4; .5 G/20ML; G/20ML
0 INJECTION, POWDER, LYOPHILIZED, FOR SOLUTION INTRAVENOUS
Qty: 0 | Refills: 0 | DISCHARGE

## 2019-11-08 RX ORDER — LEVETIRACETAM 250 MG/1
1 TABLET, FILM COATED ORAL
Qty: 0 | Refills: 0 | DISCHARGE

## 2019-11-08 RX ORDER — ALBUTEROL 90 UG/1
2 AEROSOL, METERED ORAL
Qty: 0 | Refills: 0 | DISCHARGE
Start: 2019-11-08

## 2019-11-08 RX ORDER — LEVETIRACETAM 250 MG/1
1 TABLET, FILM COATED ORAL
Qty: 0 | Refills: 0 | DISCHARGE
Start: 2019-11-08

## 2019-11-08 RX ORDER — SENNA PLUS 8.6 MG/1
2 TABLET ORAL
Qty: 0 | Refills: 0 | DISCHARGE
Start: 2019-11-08

## 2019-11-08 RX ORDER — INSULIN GLARGINE 100 [IU]/ML
30 INJECTION, SOLUTION SUBCUTANEOUS
Qty: 0 | Refills: 0 | DISCHARGE

## 2019-11-08 RX ORDER — SODIUM CHLORIDE 9 MG/ML
10 INJECTION INTRAMUSCULAR; INTRAVENOUS; SUBCUTANEOUS
Qty: 0 | Refills: 0 | DISCHARGE
Start: 2019-11-08

## 2019-11-08 RX ORDER — AMLODIPINE BESYLATE 2.5 MG/1
1 TABLET ORAL
Qty: 0 | Refills: 0 | DISCHARGE
Start: 2019-11-08

## 2019-11-08 RX ORDER — ALBUTEROL 90 UG/1
3 AEROSOL, METERED ORAL
Qty: 0 | Refills: 0 | DISCHARGE

## 2019-11-08 RX ORDER — INSULIN GLARGINE 100 [IU]/ML
30 INJECTION, SOLUTION SUBCUTANEOUS
Qty: 0 | Refills: 0 | DISCHARGE
Start: 2019-11-08

## 2019-11-08 RX ORDER — DIVALPROEX SODIUM 500 MG/1
1 TABLET, DELAYED RELEASE ORAL
Qty: 0 | Refills: 0 | DISCHARGE

## 2019-11-08 RX ORDER — INSULIN LISPRO 100/ML
12 VIAL (ML) SUBCUTANEOUS
Qty: 0 | Refills: 0 | DISCHARGE

## 2019-11-08 RX ORDER — MIRTAZAPINE 45 MG/1
1 TABLET, ORALLY DISINTEGRATING ORAL
Qty: 0 | Refills: 0 | DISCHARGE
Start: 2019-11-08

## 2019-11-08 RX ORDER — MIRTAZAPINE 45 MG/1
1 TABLET, ORALLY DISINTEGRATING ORAL
Qty: 0 | Refills: 0 | DISCHARGE

## 2019-11-08 RX ORDER — POLYETHYLENE GLYCOL 3350 17 G/17G
17 POWDER, FOR SOLUTION ORAL
Qty: 0 | Refills: 0 | DISCHARGE
Start: 2019-11-08

## 2019-11-08 RX ORDER — VANCOMYCIN HCL 1 G
1 VIAL (EA) INTRAVENOUS
Qty: 0 | Refills: 0 | DISCHARGE
Start: 2019-11-08

## 2019-11-08 RX ORDER — GABAPENTIN 400 MG/1
1 CAPSULE ORAL
Qty: 0 | Refills: 0 | DISCHARGE
Start: 2019-11-08

## 2019-11-08 RX ORDER — QUETIAPINE FUMARATE 200 MG/1
1 TABLET, FILM COATED ORAL
Qty: 0 | Refills: 0 | DISCHARGE
Start: 2019-11-08

## 2019-11-08 RX ORDER — APIXABAN 2.5 MG/1
1 TABLET, FILM COATED ORAL
Qty: 0 | Refills: 0 | DISCHARGE

## 2019-11-08 RX ORDER — HEPARIN SODIUM 5000 [USP'U]/ML
5000 INJECTION INTRAVENOUS; SUBCUTANEOUS
Qty: 0 | Refills: 0 | DISCHARGE
Start: 2019-11-08

## 2019-11-08 RX ADMIN — OXYCODONE AND ACETAMINOPHEN 2 TABLET(S): 5; 325 TABLET ORAL at 14:50

## 2019-11-08 RX ADMIN — GABAPENTIN 300 MILLIGRAM(S): 400 CAPSULE ORAL at 06:14

## 2019-11-08 RX ADMIN — DIVALPROEX SODIUM 500 MILLIGRAM(S): 500 TABLET, DELAYED RELEASE ORAL at 06:13

## 2019-11-08 RX ADMIN — AMLODIPINE BESYLATE 10 MILLIGRAM(S): 2.5 TABLET ORAL at 06:14

## 2019-11-08 RX ADMIN — OXYCODONE AND ACETAMINOPHEN 2 TABLET(S): 5; 325 TABLET ORAL at 04:52

## 2019-11-08 RX ADMIN — LEVETIRACETAM 500 MILLIGRAM(S): 250 TABLET, FILM COATED ORAL at 06:14

## 2019-11-08 RX ADMIN — Medication 12 UNIT(S): at 08:32

## 2019-11-08 RX ADMIN — HYDROMORPHONE HYDROCHLORIDE 1 MILLIGRAM(S): 2 INJECTION INTRAMUSCULAR; INTRAVENOUS; SUBCUTANEOUS at 02:10

## 2019-11-08 RX ADMIN — Medication 12 UNIT(S): at 12:48

## 2019-11-08 RX ADMIN — HYDROMORPHONE HYDROCHLORIDE 1 MILLIGRAM(S): 2 INJECTION INTRAMUSCULAR; INTRAVENOUS; SUBCUTANEOUS at 06:27

## 2019-11-08 RX ADMIN — GABAPENTIN 300 MILLIGRAM(S): 400 CAPSULE ORAL at 12:50

## 2019-11-08 RX ADMIN — HYDROMORPHONE HYDROCHLORIDE 1 MILLIGRAM(S): 2 INJECTION INTRAMUSCULAR; INTRAVENOUS; SUBCUTANEOUS at 16:00

## 2019-11-08 RX ADMIN — HYDROMORPHONE HYDROCHLORIDE 1 MILLIGRAM(S): 2 INJECTION INTRAMUSCULAR; INTRAVENOUS; SUBCUTANEOUS at 01:55

## 2019-11-08 RX ADMIN — MEROPENEM 100 MILLIGRAM(S): 1 INJECTION INTRAVENOUS at 12:49

## 2019-11-08 RX ADMIN — Medication 250 MILLIGRAM(S): at 00:04

## 2019-11-08 RX ADMIN — CHLORHEXIDINE GLUCONATE 1 APPLICATION(S): 213 SOLUTION TOPICAL at 12:46

## 2019-11-08 RX ADMIN — Medication 325 MILLIGRAM(S): at 12:49

## 2019-11-08 RX ADMIN — MEROPENEM 100 MILLIGRAM(S): 1 INJECTION INTRAVENOUS at 06:12

## 2019-11-08 RX ADMIN — OXYCODONE AND ACETAMINOPHEN 2 TABLET(S): 5; 325 TABLET ORAL at 08:33

## 2019-11-08 RX ADMIN — HYDROMORPHONE HYDROCHLORIDE 1 MILLIGRAM(S): 2 INJECTION INTRAMUSCULAR; INTRAVENOUS; SUBCUTANEOUS at 11:20

## 2019-11-08 RX ADMIN — HYDROMORPHONE HYDROCHLORIDE 1 MILLIGRAM(S): 2 INJECTION INTRAMUSCULAR; INTRAVENOUS; SUBCUTANEOUS at 06:12

## 2019-11-08 RX ADMIN — Medication 1 APPLICATION(S): at 12:48

## 2019-11-08 RX ADMIN — POLYETHYLENE GLYCOL 3350 17 GRAM(S): 17 POWDER, FOR SOLUTION ORAL at 06:15

## 2019-11-08 RX ADMIN — OXYCODONE AND ACETAMINOPHEN 2 TABLET(S): 5; 325 TABLET ORAL at 08:00

## 2019-11-08 RX ADMIN — OXYCODONE AND ACETAMINOPHEN 2 TABLET(S): 5; 325 TABLET ORAL at 15:31

## 2019-11-08 RX ADMIN — OXYCODONE AND ACETAMINOPHEN 2 TABLET(S): 5; 325 TABLET ORAL at 03:52

## 2019-11-08 RX ADMIN — HYDROMORPHONE HYDROCHLORIDE 1 MILLIGRAM(S): 2 INJECTION INTRAMUSCULAR; INTRAVENOUS; SUBCUTANEOUS at 11:08

## 2019-11-08 RX ADMIN — Medication 250 MILLIGRAM(S): at 11:10

## 2019-11-08 RX ADMIN — PANTOPRAZOLE SODIUM 40 MILLIGRAM(S): 20 TABLET, DELAYED RELEASE ORAL at 06:13

## 2019-11-08 RX ADMIN — Medication 1 MILLIGRAM(S): at 12:49

## 2019-11-08 RX ADMIN — HYDROMORPHONE HYDROCHLORIDE 1 MILLIGRAM(S): 2 INJECTION INTRAMUSCULAR; INTRAVENOUS; SUBCUTANEOUS at 15:42

## 2019-11-08 RX ADMIN — TIOTROPIUM BROMIDE 1 CAPSULE(S): 18 CAPSULE ORAL; RESPIRATORY (INHALATION) at 12:47

## 2019-11-08 NOTE — PROGRESS NOTE ADULT - PROBLEM SELECTOR PROBLEM 1
Diabetes
Psoas abscess
Anemia
Psoas abscess
Anemia
Anemia
Diabetes
Psoas abscess
Psoas abscess
Diabetes
Psoas abscess

## 2019-11-08 NOTE — PROGRESS NOTE ADULT - PROBLEM SELECTOR PROBLEM 3
Anemia due to blood loss
Other chronic osteomyelitis of foot
Other chronic osteomyelitis of foot
Anemia due to blood loss
Other chronic osteomyelitis of foot
Hepatitis C, chronic
Other chronic osteomyelitis of foot
Anemia due to blood loss
Hepatitis C, chronic
Hepatitis C, chronic
Other chronic osteomyelitis of foot

## 2019-11-08 NOTE — DISCHARGE NOTE NURSING/CASE MANAGEMENT/SOCIAL WORK - NSDCFUADDAPPT_GEN_ALL_CORE_FT
VERY IMPORTANT INFORMATION FOR ANTIBIOTIC THERAPY FOR OSTEOMYELITIS:  Complete 6 more weeks of intravenous antibiotics via PICC Line.  UNTIL DECEMBER 15, 2019 FOR PSOAS ABSCESS AND OSTEOMYELITIS.   TO FOLLOW UP FOR REPEAT MRI AFTER COMPLETION OF ANTIBIOTIC THERAPY WHILE IN REHAB.

## 2019-11-08 NOTE — DISCHARGE NOTE NURSING/CASE MANAGEMENT/SOCIAL WORK - NSDCPEEMAIL_GEN_ALL_CORE
[facility] for Tobacco Control email [email or web address]

## 2019-11-08 NOTE — PROGRESS NOTE ADULT - PROBLEM SELECTOR PLAN 1
Will continue current insulin regimen for now. Will continue monitoring FS, log, and FU.  Suggest to d/c to rehab on current insulin regimen and FU endo 4 weeks.    Patient counseled for compliance with consistent low carb diet and exercise as tolerated outpatient.

## 2019-11-08 NOTE — DISCHARGE NOTE NURSING/CASE MANAGEMENT/SOCIAL WORK - NSDCPEWEB_GEN_ALL_CORE
[place] website --- [email or web address] Tobacco [email or web address] website --- [email or web address]

## 2019-11-08 NOTE — DISCHARGE NOTE NURSING/CASE MANAGEMENT/SOCIAL WORK - PATIENT PORTAL LINK FT
You can access the FollowMyHealth Patient Portal offered by [facility] by registering at the following website: [email or web address]. By joining [employer]’s FollowMyHealth portal, you will also be able to view your health information using other applications (apps) compatible with our system.

## 2019-11-08 NOTE — PROGRESS NOTE ADULT - PROBLEM SELECTOR PROBLEM 2
Controlled type 2 diabetes mellitus without complication, with long-term current use of insulin
Psoas abscess
Controlled type 2 diabetes mellitus without complication, with long-term current use of insulin
Controlled type 2 diabetes mellitus without complication, with long-term current use of insulin
Psoas abscess
Controlled type 2 diabetes mellitus without complication, with long-term current use of insulin
Osteomyelitis
Psoas abscess
Controlled type 2 diabetes mellitus without complication, with long-term current use of insulin

## 2019-11-08 NOTE — PROGRESS NOTE ADULT - PROVIDER SPECIALTY LIST ADULT
Endocrinology
Gastroenterology
Infectious Disease
Internal Medicine
Intervent Radiology
Neurosurgery
Podiatry
Infectious Disease
Endocrinology
Gastroenterology
Internal Medicine
Endocrinology
Internal Medicine

## 2019-11-08 NOTE — PROGRESS NOTE ADULT - PROBLEM SELECTOR PLAN 3
Podiatry darcy noted
Podiatry FU noted
Podiatry darcy noted
Podiatry FU noted
s/p transfusion, continue trending labs, FU primary team.
Podiatry darcy noted
Podiatry FU noted
s/p transfusion, continue trending labs, FU primary team.
Podiatry FU noted
Podiatry darcy noted
- Positive Hep C serology, viral load almost undetectable  - abdominal U/S with dilated CBD, MRCP without pathology seen  - outpatient treatment    Advanced care planning was discussed with patient and family.  Advanced care planning forms were reviewed and discussed.  Risks, benefits and alternatives of gastroenterologic procedures were discussed in detail and all questions were answered.    30 minutes spent.
- abdominal U/S with dilated CBD, f/u MRCP results  - outpatient treatment    Advanced care planning was discussed with patient and family.  Advanced care planning forms were reviewed and discussed.  Risks, benefits and alternatives of gastroenterologic procedures were discussed in detail and all questions were answered.    30 minutes spent.
- await viral load, genotype  - abdominal U/S with dilated CBD, f/u MRCP for further evaluation  - outpatient treatment
s/p transfusion, continue trending labs, FU primary team.

## 2019-11-08 NOTE — PROGRESS NOTE ADULT - REASON FOR ADMISSION
Back pain

## 2019-11-08 NOTE — PROGRESS NOTE ADULT - ASSESSMENT
Assessment  DMT2: 63y Male with DM T2 with hyperglycemia was on oral meds at home, now on basal bolus insulin, sugars improved and trending within acceptable range, no hypoglycemic episode, eating full meals, comfortable and in good spirits, planning d/c to rehab today.  LE Abscess: S/P IR drainage of abscess, on ABx, afebrile, stable, monitored.  HTN: Controlled,  on antihypertensive medications.  GIB: s/p egd/colonoscopy + gastritis, FU GI        Jose Alatorre MD  Cell: [phone]  Office: [phone]

## 2019-11-08 NOTE — PROGRESS NOTE ADULT - PROBLEM SELECTOR PLAN 4
Patient reports DVT in arms, shoulders. Dopplers of all extremities ordered. He is on apixiban but he has rectal bleeding.
Dopplers of all extremities do not show DVT in legs. Eliquis stopped.
Patient reports DVT in arms, shoulders. Dopplers of all extremities ordered.No DVT in legs. Eliquis stopped
Dopplers of all extremities do not show DVT in legs. Eliquis stopped.
s/p egd/colonoscopy, FU GI recommendations.
Dopplers of all extremities do not show DVT in legs. Eliquis stopped
Dopplers of all extremities do not show DVT in legs. Eliquis stopped.
Planning egd/colonoscopy today, FU GI recommendations.
Dopplers of all extremities do not show DVT in legs. Eliquis stopped.
Patient reports DVT in arms, shoulders. Dopplers of all extremities ordered. He is on apixiban but he has rectal bleeding.
s/p egd/colonoscopy, FU GI recommendations.

## 2019-11-08 NOTE — PROGRESS NOTE ADULT - PROBLEM SELECTOR PLAN 2
RISS, Insulin doses as started.
Suggest to continue meds, FU primary team/podiatry/ID recommendations.
RISS, Insulin doses as started
RISS, Insulin doses as started.
Suggest to continue meds, FU primary team/podiatry/ID recommendations.
- IV abx as per ID appreciated
RISS, Insulin doses as ordered.
RISS, Insulin doses as started
- IV abx as per ID appreciated
- IV abx as per ID appreciated  - f/u MRI
Suggest to continue meds, FU primary team/podiatry/ID recommendations.
RISS, Insulin doses as started
RISS, Insulin doses as ordered.
RISS, Insulin doses as started

## 2019-11-08 NOTE — PROGRESS NOTE ADULT - SUBJECTIVE AND OBJECTIVE BOX
Chief complaint  Patient is a 63y old  Male who presents with a chief complaint of Back pain (07 Nov 2019 12:53)   Review of systems  Patient in bed, looks comfortable, no fever, no hypoglycemia.    Labs and Fingersticks  CAPILLARY BLOOD GLUCOSE      POCT Blood Glucose.: 123 mg/dL (08 Nov 2019 12:34)  POCT Blood Glucose.: 126 mg/dL (08 Nov 2019 08:23)  POCT Blood Glucose.: 129 mg/dL (07 Nov 2019 22:49)  POCT Blood Glucose.: 80 mg/dL (07 Nov 2019 21:58)  POCT Blood Glucose.: 100 mg/dL (07 Nov 2019 17:02)                                            8.0    5.74  )-----------( 243      ( 07 Nov 2019 06:52 )             25.3     Medications  MEDICATIONS  (STANDING):  amLODIPine   Tablet 10 milliGRAM(s) Oral daily  chlorhexidine 2% Cloths 1 Application(s) Topical daily  chlorhexidine 4% Liquid 1 Application(s) Topical <User Schedule>  collagenase Ointment 1 Application(s) Topical daily  dextrose 5%. 1000 milliLiter(s) (50 mL/Hr) IV Continuous <Continuous>  dextrose 50% Injectable 12.5 Gram(s) IV Push once  dextrose 50% Injectable 25 Gram(s) IV Push once  dextrose 50% Injectable 25 Gram(s) IV Push once  diVALproex  milliGRAM(s) Oral <User Schedule>  ferrous    sulfate 325 milliGRAM(s) Oral daily  folic acid 1 milliGRAM(s) Oral daily  gabapentin 300 milliGRAM(s) Oral three times a day  heparin  Injectable 5000 Unit(s) SubCutaneous two times a day  insulin glargine Injectable (LANTUS) 30 Unit(s) SubCutaneous at bedtime  insulin lispro (HumaLOG) corrective regimen sliding scale   SubCutaneous three times a day before meals  insulin lispro (HumaLOG) corrective regimen sliding scale   SubCutaneous at bedtime  insulin lispro Injectable (HumaLOG) 12 Unit(s) SubCutaneous three times a day before meals  levETIRAcetam 500 milliGRAM(s) Oral two times a day  meropenem  IVPB 1000 milliGRAM(s) IV Intermittent every 8 hours  mirtazapine 30 milliGRAM(s) Oral daily  pantoprazole  Injectable 40 milliGRAM(s) IV Push two times a day  polyethylene glycol 3350 17 Gram(s) Oral two times a day  QUEtiapine 100 milliGRAM(s) Oral at bedtime  senna 2 Tablet(s) Oral at bedtime  tiotropium 18 MICROgram(s) Capsule 1 Capsule(s) Inhalation daily  vancomycin  IVPB 1000 milliGRAM(s) IV Intermittent every 12 hours      Physical Exam  General: Patient comfortable in bed  Vital Signs Last 12 Hrs  T(F): 98.3 (11-08-19 @ 06:02), Max: 98.8 (11-08-19 @ 01:10)  HR: 97 (11-08-19 @ 06:02) (80 - 97)  BP: 146/81 (11-08-19 @ 06:02) (112/65 - 146/81)  BP(mean): --  RR: 18 (11-08-19 @ 06:02) (18 - 18)  SpO2: 98% (11-08-19 @ 06:02) (96% - 98%)  Neck: No palpable thyroid nodules.  CVS: S1S2, No murmurs  Respiratory: No wheezing, no crepitations  GI: Abdomen soft, bowel sounds positive  Musculoskeletal:  edema lower extremities.   Skin: No skin rashes, no ecchymosis    Diagnostics

## 2019-11-08 NOTE — PROGRESS NOTE ADULT - PROBLEM SELECTOR PROBLEM 4
Deep vein thrombosis (DVT) of upper extremity, unspecified chronicity, unspecified laterality, unspecified vein
GI bleed
Deep vein thrombosis (DVT) of upper extremity, unspecified chronicity, unspecified laterality, unspecified vein
Deep vein thrombosis (DVT) of upper extremity, unspecified chronicity, unspecified laterality, unspecified vein
GI bleed
Deep vein thrombosis (DVT) of upper extremity, unspecified chronicity, unspecified laterality, unspecified vein
GI bleed

## 2019-11-30 LAB
CULTURE RESULTS: SIGNIFICANT CHANGE UP
SPECIMEN SOURCE: SIGNIFICANT CHANGE UP

## 2019-12-21 LAB
CULTURE RESULTS: SIGNIFICANT CHANGE UP
SPECIMEN SOURCE: SIGNIFICANT CHANGE UP

## 2021-12-10 NOTE — PROGRESS NOTE ADULT - ASSESSMENT
62 yo male with DM, HTN, and epilepsy was transferred from [facility] for possible T12-L1 discitis / osteomyelitis following a mechanical fall 5 days ago. Currently receiving IV Vanc/Zosyn for right foot osteomyelitis. Was seen at [facility] 10/20 where a CT abd/pelvis found an enlarged psoas muscle with ? abscesses with possible extension to the T12 L1 disc with a compression fracture suspicious for osteomyelitis.      Overall foot OM, concern for psoas abscess, spinal OM, elevated ESR. Uncontrolled DM.       Plan:   - MRI with contrast of Thoracolumbar spine still pending   - f/u BCx, NTD.   - c/w Zosyn,   - C/W Vanc 1g q12. Per patient, has completed more than 2 weeks of therapy.   - trough therapeutic, continue with the same dose.   - S/p Neurosurgery eval   - s/p podiatry eval.   - Positive Hep C serology, viral load almost undetectable.   - U/S with iliopsoas hematoma not abscess. MRCP with abscess, needs MRI spine for final determination.   - Will reorder ESR/CRP in am. denies pain/discomfort

## 2022-01-26 ENCOUNTER — HOSPITAL ENCOUNTER (INPATIENT)
Dept: HOSPITAL 74 - YASAS | Age: 67
LOS: 5 days | Discharge: HOME | DRG: 897 | End: 2022-01-31
Attending: ALLERGY & IMMUNOLOGY | Admitting: ALLERGY & IMMUNOLOGY
Payer: COMMERCIAL

## 2022-01-26 VITALS — BODY MASS INDEX: 19.8 KG/M2

## 2022-01-26 DIAGNOSIS — F14.10: ICD-10-CM

## 2022-01-26 DIAGNOSIS — F10.10: ICD-10-CM

## 2022-01-26 DIAGNOSIS — J45.909: ICD-10-CM

## 2022-01-26 DIAGNOSIS — F19.24: ICD-10-CM

## 2022-01-26 DIAGNOSIS — I25.10: ICD-10-CM

## 2022-01-26 DIAGNOSIS — Z79.4: ICD-10-CM

## 2022-01-26 DIAGNOSIS — G40.909: ICD-10-CM

## 2022-01-26 DIAGNOSIS — I10: ICD-10-CM

## 2022-01-26 DIAGNOSIS — F19.282: ICD-10-CM

## 2022-01-26 DIAGNOSIS — R79.89: ICD-10-CM

## 2022-01-26 DIAGNOSIS — Z95.5: ICD-10-CM

## 2022-01-26 DIAGNOSIS — Z99.89: ICD-10-CM

## 2022-01-26 DIAGNOSIS — F12.10: ICD-10-CM

## 2022-01-26 DIAGNOSIS — Z89.422: ICD-10-CM

## 2022-01-26 DIAGNOSIS — E11.65: ICD-10-CM

## 2022-01-26 DIAGNOSIS — I25.2: ICD-10-CM

## 2022-01-26 DIAGNOSIS — D64.9: ICD-10-CM

## 2022-01-26 DIAGNOSIS — F11.23: Primary | ICD-10-CM

## 2022-01-26 DIAGNOSIS — F31.9: ICD-10-CM

## 2022-01-26 DIAGNOSIS — Z89.511: ICD-10-CM

## 2022-01-26 PROCEDURE — HZ2ZZZZ DETOXIFICATION SERVICES FOR SUBSTANCE ABUSE TREATMENT: ICD-10-PCS | Performed by: ALLERGY & IMMUNOLOGY

## 2022-01-26 PROCEDURE — C9803 HOPD COVID-19 SPEC COLLECT: HCPCS

## 2022-01-26 PROCEDURE — U0005 INFEC AGEN DETEC AMPLI PROBE: HCPCS

## 2022-01-26 PROCEDURE — U0003 INFECTIOUS AGENT DETECTION BY NUCLEIC ACID (DNA OR RNA); SEVERE ACUTE RESPIRATORY SYNDROME CORONAVIRUS 2 (SARS-COV-2) (CORONAVIRUS DISEASE [COVID-19]), AMPLIFIED PROBE TECHNIQUE, MAKING USE OF HIGH THROUGHPUT TECHNOLOGIES AS DESCRIBED BY CMS-2020-01-R: HCPCS

## 2022-01-26 RX ADMIN — Medication SCH MG: at 22:13

## 2022-01-26 RX ADMIN — HYDROXYZINE PAMOATE SCH MG: 25 CAPSULE ORAL at 22:13

## 2022-01-26 RX ADMIN — HYDROXYZINE PAMOATE SCH: 25 CAPSULE ORAL at 19:58

## 2022-01-27 LAB
ALBUMIN SERPL-MCNC: 3.6 G/DL (ref 3.4–5)
ALP SERPL-CCNC: 90 U/L (ref 45–117)
ALT SERPL-CCNC: 21 U/L (ref 13–61)
ANION GAP SERPL CALC-SCNC: 11 MMOL/L (ref 8–16)
APPEARANCE UR: CLEAR
AST SERPL-CCNC: 13 U/L (ref 15–37)
BACTERIA # UR AUTO: 3 /UL (ref 0–1359)
BILIRUB SERPL-MCNC: 0.2 MG/DL (ref 0.2–1)
BILIRUB UR STRIP.AUTO-MCNC: NEGATIVE MG/DL
BUN SERPL-MCNC: 20.9 MG/DL (ref 7–18)
CALCIUM SERPL-MCNC: 8.9 MG/DL (ref 8.5–10.1)
CASTS URNS QL MICRO: 0 /UL (ref 0–3.1)
CHLORIDE SERPL-SCNC: 103 MMOL/L (ref 98–107)
CO2 SERPL-SCNC: 19 MMOL/L (ref 21–32)
COLOR UR: YELLOW
CREAT SERPL-MCNC: 1.4 MG/DL (ref 0.55–1.3)
DEPRECATED RDW RBC AUTO: 13.5 % (ref 11.9–15.9)
EPITH CASTS URNS QL MICRO: 1 /UL (ref 0–25.1)
GLUCOSE SERPL-MCNC: 426 MG/DL (ref 74–106)
HCT VFR BLD CALC: 30.4 % (ref 35.4–49)
HGB BLD-MCNC: 10.3 GM/DL (ref 11.7–16.9)
KETONES UR QL STRIP: NEGATIVE
LEUKOCYTE ESTERASE UR QL STRIP.AUTO: NEGATIVE
MCH RBC QN AUTO: 29.7 PG (ref 25.7–33.7)
MCHC RBC AUTO-ENTMCNC: 33.8 G/DL (ref 32–35.9)
MCV RBC: 87.9 FL (ref 80–96)
NITRITE UR QL STRIP: NEGATIVE
PH UR: 5 [PH] (ref 5–8)
PLATELET # BLD AUTO: 200 10^3/UL (ref 134–434)
PMV BLD: 9.7 FL (ref 7.5–11.1)
PROT SERPL-MCNC: 6.8 G/DL (ref 6.4–8.2)
PROT UR QL STRIP: (no result)
PROT UR QL STRIP: (no result)
RBC # BLD AUTO: 1 /UL (ref 0–23.9)
RBC # BLD AUTO: 3.46 M/MM3 (ref 4–5.6)
SODIUM SERPL-SCNC: 133 MMOL/L (ref 136–145)
SP GR UR: 1.02 (ref 1.01–1.03)
UROBILINOGEN UR STRIP-MCNC: 0.2 MG/DL (ref 0.2–1)
WBC # BLD AUTO: 7.1 K/MM3 (ref 4–10)
WBC # UR AUTO: 7 /UL (ref 0–25.8)

## 2022-01-27 RX ADMIN — ASPIRIN 81 MG SCH MG: 81 TABLET ORAL at 12:40

## 2022-01-27 RX ADMIN — LEVETIRACETAM SCH MG: 500 TABLET, FILM COATED ORAL at 12:40

## 2022-01-27 RX ADMIN — ENALAPRIL MALEATE SCH MG: 10 TABLET ORAL at 12:40

## 2022-01-27 RX ADMIN — Medication SCH TAB: at 10:13

## 2022-01-27 RX ADMIN — ATORVASTATIN CALCIUM SCH MG: 80 TABLET, FILM COATED ORAL at 22:45

## 2022-01-27 RX ADMIN — EXTENDED PHENYTOIN SODIUM SCH MG: 100 CAPSULE ORAL at 22:45

## 2022-01-27 RX ADMIN — INSULIN ASPART SCH UNITS: 100 INJECTION, SOLUTION INTRAVENOUS; SUBCUTANEOUS at 17:23

## 2022-01-27 RX ADMIN — EXTENDED PHENYTOIN SODIUM SCH MG: 100 CAPSULE ORAL at 12:40

## 2022-01-27 RX ADMIN — INSULIN ASPART SCH UNITS: 100 INJECTION, SOLUTION INTRAVENOUS; SUBCUTANEOUS at 22:42

## 2022-01-27 RX ADMIN — DIVALPROEX SODIUM SCH MG: 500 TABLET, DELAYED RELEASE ORAL at 12:40

## 2022-01-27 RX ADMIN — Medication SCH MG: at 22:45

## 2022-01-27 RX ADMIN — BUDESONIDE AND FORMOTEROL FUMARATE DIHYDRATE SCH INH: 160; 4.5 AEROSOL RESPIRATORY (INHALATION) at 22:45

## 2022-01-27 RX ADMIN — HYDROXYZINE PAMOATE SCH MG: 25 CAPSULE ORAL at 10:13

## 2022-01-27 RX ADMIN — HYDROXYZINE PAMOATE SCH MG: 25 CAPSULE ORAL at 05:02

## 2022-01-27 RX ADMIN — HYDROCHLOROTHIAZIDE SCH MG: 25 TABLET ORAL at 12:40

## 2022-01-27 RX ADMIN — DIVALPROEX SODIUM SCH MG: 500 TABLET, DELAYED RELEASE ORAL at 22:45

## 2022-01-28 LAB
ANION GAP SERPL CALC-SCNC: 6 MMOL/L (ref 8–16)
BUN SERPL-MCNC: 28.2 MG/DL (ref 7–18)
CALCIUM SERPL-MCNC: 8.8 MG/DL (ref 8.5–10.1)
CHLORIDE SERPL-SCNC: 106 MMOL/L (ref 98–107)
CO2 SERPL-SCNC: 23 MMOL/L (ref 21–32)
CREAT SERPL-MCNC: 1.3 MG/DL (ref 0.55–1.3)
DEPRECATED RDW RBC AUTO: 13.8 % (ref 11.9–15.9)
GLUCOSE SERPL-MCNC: 257 MG/DL (ref 74–106)
HCT VFR BLD CALC: 30 % (ref 35.4–49)
HGB BLD-MCNC: 9.9 GM/DL (ref 11.7–16.9)
IRON SERPL-MCNC: 32 UG/DL (ref 50–175)
MCH RBC QN AUTO: 29.3 PG (ref 25.7–33.7)
MCHC RBC AUTO-ENTMCNC: 32.9 G/DL (ref 32–35.9)
MCV RBC: 89.1 FL (ref 80–96)
PLATELET # BLD AUTO: 214 10^3/UL (ref 134–434)
PMV BLD: 10.2 FL (ref 7.5–11.1)
RBC # BLD AUTO: 3.37 M/MM3 (ref 4–5.6)
RETICS # AUTO: 1.67 % (ref 0.5–1.5)
SODIUM SERPL-SCNC: 136 MMOL/L (ref 136–145)
TIBC SERPL-MCNC: 284 UG/DL (ref 250–450)
WBC # BLD AUTO: 7.2 K/MM3 (ref 4–10)

## 2022-01-28 RX ADMIN — HYDROCHLOROTHIAZIDE SCH MG: 25 TABLET ORAL at 10:13

## 2022-01-28 RX ADMIN — LEVETIRACETAM SCH MG: 500 TABLET, FILM COATED ORAL at 10:13

## 2022-01-28 RX ADMIN — EXTENDED PHENYTOIN SODIUM SCH MG: 100 CAPSULE ORAL at 10:13

## 2022-01-28 RX ADMIN — Medication SCH TAB: at 10:21

## 2022-01-28 RX ADMIN — BUDESONIDE AND FORMOTEROL FUMARATE DIHYDRATE SCH INH: 160; 4.5 AEROSOL RESPIRATORY (INHALATION) at 10:20

## 2022-01-28 RX ADMIN — DIVALPROEX SODIUM SCH MG: 500 TABLET, DELAYED RELEASE ORAL at 10:13

## 2022-01-28 RX ADMIN — INSULIN ASPART SCH UNITS: 100 INJECTION, SOLUTION INTRAVENOUS; SUBCUTANEOUS at 12:42

## 2022-01-28 RX ADMIN — Medication SCH MG: at 22:10

## 2022-01-28 RX ADMIN — PIOGLITAZONE SCH MG: 15 TABLET ORAL at 06:14

## 2022-01-28 RX ADMIN — INSULIN ASPART SCH UNITS: 100 INJECTION, SOLUTION INTRAVENOUS; SUBCUTANEOUS at 22:11

## 2022-01-28 RX ADMIN — DIVALPROEX SODIUM SCH MG: 500 TABLET, DELAYED RELEASE ORAL at 22:10

## 2022-01-28 RX ADMIN — INSULIN ASPART SCH UNITS: 100 INJECTION, SOLUTION INTRAVENOUS; SUBCUTANEOUS at 16:54

## 2022-01-28 RX ADMIN — ATORVASTATIN CALCIUM SCH MG: 80 TABLET, FILM COATED ORAL at 22:10

## 2022-01-28 RX ADMIN — INSULIN ASPART SCH UNITS: 100 INJECTION, SOLUTION INTRAVENOUS; SUBCUTANEOUS at 07:53

## 2022-01-28 RX ADMIN — INSULIN DETEMIR SCH UNITS: 100 INJECTION, SOLUTION SUBCUTANEOUS at 22:11

## 2022-01-28 RX ADMIN — ASPIRIN 81 MG SCH MG: 81 TABLET ORAL at 10:13

## 2022-01-28 RX ADMIN — BUDESONIDE AND FORMOTEROL FUMARATE DIHYDRATE SCH INH: 160; 4.5 AEROSOL RESPIRATORY (INHALATION) at 22:12

## 2022-01-28 RX ADMIN — EXTENDED PHENYTOIN SODIUM SCH MG: 100 CAPSULE ORAL at 22:10

## 2022-01-28 RX ADMIN — ENALAPRIL MALEATE SCH MG: 10 TABLET ORAL at 10:13

## 2022-01-29 RX ADMIN — Medication SCH TAB: at 10:25

## 2022-01-29 RX ADMIN — DIVALPROEX SODIUM SCH MG: 500 TABLET, DELAYED RELEASE ORAL at 10:25

## 2022-01-29 RX ADMIN — PIOGLITAZONE SCH MG: 15 TABLET ORAL at 06:24

## 2022-01-29 RX ADMIN — INSULIN ASPART SCH UNITS: 100 INJECTION, SOLUTION INTRAVENOUS; SUBCUTANEOUS at 16:47

## 2022-01-29 RX ADMIN — ATORVASTATIN CALCIUM SCH MG: 80 TABLET, FILM COATED ORAL at 22:44

## 2022-01-29 RX ADMIN — ASPIRIN 81 MG SCH MG: 81 TABLET ORAL at 10:25

## 2022-01-29 RX ADMIN — FERROUS SULFATE TAB EC 324 MG (65 MG FE EQUIVALENT) SCH MG: 324 (65 FE) TABLET DELAYED RESPONSE at 17:39

## 2022-01-29 RX ADMIN — EXTENDED PHENYTOIN SODIUM SCH MG: 100 CAPSULE ORAL at 22:44

## 2022-01-29 RX ADMIN — EXTENDED PHENYTOIN SODIUM SCH MG: 100 CAPSULE ORAL at 10:25

## 2022-01-29 RX ADMIN — DIVALPROEX SODIUM SCH MG: 500 TABLET, DELAYED RELEASE ORAL at 22:44

## 2022-01-29 RX ADMIN — INSULIN ASPART SCH UNITS: 100 INJECTION, SOLUTION INTRAVENOUS; SUBCUTANEOUS at 11:58

## 2022-01-29 RX ADMIN — BUDESONIDE AND FORMOTEROL FUMARATE DIHYDRATE SCH INH: 160; 4.5 AEROSOL RESPIRATORY (INHALATION) at 22:44

## 2022-01-29 RX ADMIN — HYDROCHLOROTHIAZIDE SCH MG: 25 TABLET ORAL at 10:25

## 2022-01-29 RX ADMIN — LEVETIRACETAM SCH MG: 500 TABLET, FILM COATED ORAL at 10:25

## 2022-01-29 RX ADMIN — BUDESONIDE AND FORMOTEROL FUMARATE DIHYDRATE SCH INH: 160; 4.5 AEROSOL RESPIRATORY (INHALATION) at 10:26

## 2022-01-29 RX ADMIN — INSULIN ASPART SCH UNITS: 100 INJECTION, SOLUTION INTRAVENOUS; SUBCUTANEOUS at 07:23

## 2022-01-29 RX ADMIN — INSULIN ASPART SCH UNITS: 100 INJECTION, SOLUTION INTRAVENOUS; SUBCUTANEOUS at 22:44

## 2022-01-29 RX ADMIN — INSULIN DETEMIR SCH UNITS: 100 INJECTION, SOLUTION SUBCUTANEOUS at 22:45

## 2022-01-29 RX ADMIN — ENALAPRIL MALEATE SCH MG: 10 TABLET ORAL at 10:25

## 2022-01-29 RX ADMIN — Medication SCH MG: at 22:44

## 2022-01-30 RX ADMIN — INSULIN ASPART SCH UNITS: 100 INJECTION, SOLUTION INTRAVENOUS; SUBCUTANEOUS at 21:08

## 2022-01-30 RX ADMIN — LEVETIRACETAM SCH MG: 500 TABLET, FILM COATED ORAL at 10:13

## 2022-01-30 RX ADMIN — ENALAPRIL MALEATE SCH MG: 10 TABLET ORAL at 10:13

## 2022-01-30 RX ADMIN — INSULIN ASPART SCH UNITS: 100 INJECTION, SOLUTION INTRAVENOUS; SUBCUTANEOUS at 16:19

## 2022-01-30 RX ADMIN — INSULIN ASPART SCH UNITS: 100 INJECTION, SOLUTION INTRAVENOUS; SUBCUTANEOUS at 11:34

## 2022-01-30 RX ADMIN — EXTENDED PHENYTOIN SODIUM SCH MG: 100 CAPSULE ORAL at 10:13

## 2022-01-30 RX ADMIN — ASPIRIN 81 MG SCH MG: 81 TABLET ORAL at 10:13

## 2022-01-30 RX ADMIN — Medication SCH MG: at 21:14

## 2022-01-30 RX ADMIN — ATORVASTATIN CALCIUM SCH MG: 80 TABLET, FILM COATED ORAL at 21:14

## 2022-01-30 RX ADMIN — INSULIN DETEMIR SCH UNITS: 100 INJECTION, SOLUTION SUBCUTANEOUS at 21:08

## 2022-01-30 RX ADMIN — FERROUS SULFATE TAB EC 324 MG (65 MG FE EQUIVALENT) SCH MG: 324 (65 FE) TABLET DELAYED RESPONSE at 08:04

## 2022-01-30 RX ADMIN — Medication SCH TAB: at 10:12

## 2022-01-30 RX ADMIN — FERROUS SULFATE TAB EC 324 MG (65 MG FE EQUIVALENT) SCH MG: 324 (65 FE) TABLET DELAYED RESPONSE at 17:45

## 2022-01-30 RX ADMIN — DIVALPROEX SODIUM SCH MG: 500 TABLET, DELAYED RELEASE ORAL at 21:14

## 2022-01-30 RX ADMIN — EXTENDED PHENYTOIN SODIUM SCH MG: 100 CAPSULE ORAL at 21:14

## 2022-01-30 RX ADMIN — DIVALPROEX SODIUM SCH MG: 500 TABLET, DELAYED RELEASE ORAL at 10:13

## 2022-01-30 RX ADMIN — FERROUS SULFATE TAB EC 324 MG (65 MG FE EQUIVALENT) SCH MG: 324 (65 FE) TABLET DELAYED RESPONSE at 11:34

## 2022-01-30 RX ADMIN — PIOGLITAZONE SCH MG: 15 TABLET ORAL at 08:04

## 2022-01-30 RX ADMIN — INSULIN ASPART SCH UNITS: 100 INJECTION, SOLUTION INTRAVENOUS; SUBCUTANEOUS at 08:04

## 2022-01-30 RX ADMIN — BUDESONIDE AND FORMOTEROL FUMARATE DIHYDRATE SCH INH: 160; 4.5 AEROSOL RESPIRATORY (INHALATION) at 10:13

## 2022-01-30 RX ADMIN — HYDROCHLOROTHIAZIDE SCH MG: 25 TABLET ORAL at 10:13

## 2022-01-30 RX ADMIN — BUDESONIDE AND FORMOTEROL FUMARATE DIHYDRATE SCH INH: 160; 4.5 AEROSOL RESPIRATORY (INHALATION) at 21:14

## 2022-01-31 VITALS — TEMPERATURE: 98.3 F | SYSTOLIC BLOOD PRESSURE: 122 MMHG | DIASTOLIC BLOOD PRESSURE: 52 MMHG | HEART RATE: 77 BPM

## 2022-01-31 RX ADMIN — INSULIN ASPART SCH UNITS: 100 INJECTION, SOLUTION INTRAVENOUS; SUBCUTANEOUS at 08:12

## 2022-01-31 RX ADMIN — FERROUS SULFATE TAB EC 324 MG (65 MG FE EQUIVALENT) SCH MG: 324 (65 FE) TABLET DELAYED RESPONSE at 07:05

## 2022-01-31 RX ADMIN — PIOGLITAZONE SCH MG: 15 TABLET ORAL at 06:15

## 2024-11-04 NOTE — ED PROVIDER NOTE - NS HIV RISK FACTOR YES
[Joint Pain] : joint pain [Joint Stiffness] : joint stiffness [Negative] : Heme/Lymph Unable to consent due to medical condition

## 2025-03-03 NOTE — DISCHARGE NOTE NURSING/CASE MANAGEMENT/SOCIAL WORK - NSDCPEHOTLINE_GEN_ALL_CORE
"Goal Outcome Evaluation:      Plan of Care Reviewed With: patient    Overall Patient Progress: improvingOverall Patient Progress: improving    VS: /70 (BP Location: Left arm)   Pulse 65   Temp 98.2  F (36.8  C) (Oral)   Resp 16   Ht 1.854 m (6' 1\")   Wt 113.4 kg (250 lb)   SpO2 96%   BMI 32.98 kg/m       O2: SpO2 96% on RA. 96% Denies chest pain and SOB.    Output: Cont x 2   Last BM: 3/2   Activity: Up with A x 1 sliding board    Skin: WDL except, surgical site R hip.    Pain: Pain managed with prn   CMS: Intact, Alert and oriented. Denies numbness and tingling.   Dressing: CDI   Diet: Regular diet, thin liquids takes pills whole. Denies nausea/vomiting.    LDA: HUGO drain and wound vac draining well   Equipment: HUGO drain and Wound Vac was disconnected 3/2 but the code is still with the pt.   Plan: Continue with plan of care. Call light within reach, pt able to make needs known.    Additional Info: Requested Oxycodone with relief x 2            " [facility] Smokers Quitline [phone] ([phone])